# Patient Record
Sex: MALE | Race: BLACK OR AFRICAN AMERICAN | Employment: FULL TIME | URBAN - NONMETROPOLITAN AREA
[De-identification: names, ages, dates, MRNs, and addresses within clinical notes are randomized per-mention and may not be internally consistent; named-entity substitution may affect disease eponyms.]

---

## 2021-12-08 ENCOUNTER — APPOINTMENT (OUTPATIENT)
Dept: GENERAL RADIOLOGY | Age: 51
End: 2021-12-08
Attending: EMERGENCY MEDICINE
Payer: COMMERCIAL

## 2021-12-08 ENCOUNTER — HOSPITAL ENCOUNTER (INPATIENT)
Age: 51
LOS: 19 days | Discharge: HOME | DRG: 853 | End: 2021-12-27
Attending: EMERGENCY MEDICINE | Admitting: INTERNAL MEDICINE
Payer: COMMERCIAL

## 2021-12-08 ENCOUNTER — HOSPITAL ENCOUNTER (EMERGENCY)
Age: 51
Discharge: ANOTHER HOSPITAL | End: 2021-12-08
Attending: EMERGENCY MEDICINE
Payer: COMMERCIAL

## 2021-12-08 ENCOUNTER — APPOINTMENT (OUTPATIENT)
Dept: CT IMAGING | Age: 51
End: 2021-12-08
Attending: EMERGENCY MEDICINE
Payer: COMMERCIAL

## 2021-12-08 VITALS
SYSTOLIC BLOOD PRESSURE: 156 MMHG | HEIGHT: 71 IN | WEIGHT: 162 LBS | TEMPERATURE: 100 F | RESPIRATION RATE: 28 BRPM | HEART RATE: 120 BPM | DIASTOLIC BLOOD PRESSURE: 96 MMHG | OXYGEN SATURATION: 100 % | BODY MASS INDEX: 22.68 KG/M2

## 2021-12-08 DIAGNOSIS — J90 PLEURAL EFFUSION: ICD-10-CM

## 2021-12-08 DIAGNOSIS — J18.9 COMMUNITY ACQUIRED PNEUMONIA OF RIGHT LUNG, UNSPECIFIED PART OF LUNG: Primary | ICD-10-CM

## 2021-12-08 DIAGNOSIS — Z98.890 S/P THORACOTOMY: ICD-10-CM

## 2021-12-08 LAB
ALBUMIN SERPL-MCNC: 1.8 G/DL (ref 3.5–5)
ALBUMIN/GLOB SERPL: 0.3 {RATIO} (ref 1.1–2.2)
ALP SERPL-CCNC: 118 U/L (ref 45–117)
ALT SERPL-CCNC: 56 U/L (ref 12–78)
ANION GAP SERPL CALC-SCNC: 11 MMOL/L (ref 5–15)
ARTERIAL PATENCY WRIST A: ABNORMAL
AST SERPL W P-5'-P-CCNC: 51 U/L (ref 15–37)
BASE EXCESS BLDA CALC-SCNC: 2.7 MMOL/L (ref 0–2)
BASOPHILS # BLD: 0.4 K/UL (ref 0–0.2)
BASOPHILS NFR BLD: 1 % (ref 0–2.5)
BDY SITE: ABNORMAL
BILIRUB SERPL-MCNC: 1 MG/DL (ref 0.2–1)
BUN SERPL-MCNC: 13 MG/DL (ref 6–20)
BUN/CREAT SERPL: 17 (ref 12–20)
CA-I BLD-MCNC: 9 MG/DL (ref 8.5–10.1)
CHLORIDE SERPL-SCNC: 87 MMOL/L (ref 97–108)
CO2 SERPL-SCNC: 28 MMOL/L (ref 21–32)
COHGB MFR BLD: 0.8 % (ref 0–5)
COVID-19 RAPID TEST, COVR: NOT DETECTED
CREAT SERPL-MCNC: 0.78 MG/DL (ref 0.7–1.3)
D DIMER PPP FEU-MCNC: 2.58 MG/L FEU (ref 0.19–0.5)
EOSINOPHIL # BLD: 0 K/UL (ref 0–0.7)
EOSINOPHIL NFR BLD: 0 % (ref 0.9–2.9)
ERYTHROCYTE [DISTWIDTH] IN BLOOD BY AUTOMATED COUNT: 13.5 % (ref 11.5–14.5)
ETHANOL SERPL-MCNC: 28 MG/DL
FIO2 ON VENT: 28 %
GAS FLOW.O2 O2 DELIVERY SYS: 2 L/MIN
GLOBULIN SER CALC-MCNC: 5.6 G/DL (ref 2–4)
GLUCOSE SERPL-MCNC: 138 MG/DL (ref 65–100)
HCO3 BLDA-SCNC: 26 MMOL/L (ref 22–26)
HCT VFR BLD AUTO: 41.1 % (ref 41–53)
HGB BLD OXIMETRY-MCNC: 13.3 G/DL (ref 13.5–17.5)
HGB BLD-MCNC: 13.8 G/DL (ref 13.5–17.5)
INR PPP: 1.2 (ref 0.9–1.1)
LACTATE SERPL-SCNC: 2.2 MMOL/L (ref 0.4–2)
LYMPHOCYTES # BLD: 0.9 K/UL (ref 1–4.8)
LYMPHOCYTES NFR BLD: 3 % (ref 20.5–51.1)
MAGNESIUM SERPL-MCNC: 2.4 MG/DL (ref 1.6–2.4)
MCH RBC QN AUTO: 28.6 PG (ref 31–34)
MCHC RBC AUTO-ENTMCNC: 33.6 G/DL (ref 31–36)
MCV RBC AUTO: 85.1 FL (ref 80–100)
METHGB MFR BLD: 0.3 % (ref 0–5)
MONOCYTES # BLD: 1.5 K/UL (ref 0.2–2.4)
MONOCYTES NFR BLD: 5 % (ref 1.7–9.3)
NEUTS SEG # BLD: 31.4 K/UL (ref 1.8–7.7)
NEUTS SEG NFR BLD: 91 % (ref 42–75)
NRBC # BLD: 0.04 K/UL
NRBC BLD-RTO: 0.1 PER 100 WBC
OXYHGB MFR BLD: 90.7 % (ref 95–100)
PCO2 BLDA: 34 MMHG (ref 35–45)
PH BLDA: 7.5 [PH] (ref 7.35–7.45)
PLATELET # BLD AUTO: 459 K/UL (ref 150–400)
PMV BLD AUTO: 8.8 FL (ref 6.5–11.5)
PO2 BLDA: 60 MMHG (ref 70–100)
POTASSIUM SERPL-SCNC: 3.6 MMOL/L (ref 3.5–5.1)
PROT SERPL-MCNC: 7.4 G/DL (ref 6.4–8.2)
PROTHROMBIN TIME: 12 SEC (ref 9–11.1)
RBC # BLD AUTO: 4.83 M/UL (ref 4.5–5.9)
SAO2% DEVICE SAO2% SENSOR NAME: ABNORMAL
SODIUM SERPL-SCNC: 126 MMOL/L (ref 136–145)
SPECIMEN SITE: ABNORMAL
SPECIMEN SOURCE: NORMAL
WBC # BLD AUTO: 34.4 K/UL (ref 4.4–11.3)

## 2021-12-08 PROCEDURE — 85025 COMPLETE CBC W/AUTO DIFF WBC: CPT

## 2021-12-08 PROCEDURE — 74011000636 HC RX REV CODE- 636: Performed by: EMERGENCY MEDICINE

## 2021-12-08 PROCEDURE — 85610 PROTHROMBIN TIME: CPT

## 2021-12-08 PROCEDURE — 36600 WITHDRAWAL OF ARTERIAL BLOOD: CPT

## 2021-12-08 PROCEDURE — 82803 BLOOD GASES ANY COMBINATION: CPT

## 2021-12-08 PROCEDURE — 99285 EMERGENCY DEPT VISIT HI MDM: CPT

## 2021-12-08 PROCEDURE — 82077 ASSAY SPEC XCP UR&BREATH IA: CPT

## 2021-12-08 PROCEDURE — 87635 SARS-COV-2 COVID-19 AMP PRB: CPT

## 2021-12-08 PROCEDURE — 74011000258 HC RX REV CODE- 258: Performed by: EMERGENCY MEDICINE

## 2021-12-08 PROCEDURE — 71045 X-RAY EXAM CHEST 1 VIEW: CPT

## 2021-12-08 PROCEDURE — 83735 ASSAY OF MAGNESIUM: CPT

## 2021-12-08 PROCEDURE — 74011250636 HC RX REV CODE- 250/636: Performed by: EMERGENCY MEDICINE

## 2021-12-08 PROCEDURE — 65270000029 HC RM PRIVATE

## 2021-12-08 PROCEDURE — 87040 BLOOD CULTURE FOR BACTERIA: CPT

## 2021-12-08 PROCEDURE — 83605 ASSAY OF LACTIC ACID: CPT

## 2021-12-08 PROCEDURE — 99284 EMERGENCY DEPT VISIT MOD MDM: CPT

## 2021-12-08 PROCEDURE — 96374 THER/PROPH/DIAG INJ IV PUSH: CPT

## 2021-12-08 PROCEDURE — 80053 COMPREHEN METABOLIC PANEL: CPT

## 2021-12-08 PROCEDURE — 36415 COLL VENOUS BLD VENIPUNCTURE: CPT

## 2021-12-08 PROCEDURE — 85379 FIBRIN DEGRADATION QUANT: CPT

## 2021-12-08 PROCEDURE — 71275 CT ANGIOGRAPHY CHEST: CPT

## 2021-12-08 RX ORDER — KETOROLAC TROMETHAMINE 30 MG/ML
15 INJECTION, SOLUTION INTRAMUSCULAR; INTRAVENOUS
Status: COMPLETED | OUTPATIENT
Start: 2021-12-08 | End: 2021-12-08

## 2021-12-08 RX ADMIN — KETOROLAC TROMETHAMINE 15 MG: 30 INJECTION, SOLUTION INTRAMUSCULAR; INTRAVENOUS at 22:44

## 2021-12-08 RX ADMIN — CEFTRIAXONE SODIUM 1 G: 1 INJECTION, POWDER, FOR SOLUTION INTRAMUSCULAR; INTRAVENOUS at 21:16

## 2021-12-08 RX ADMIN — SODIUM CHLORIDE 1000 ML: 9 INJECTION, SOLUTION INTRAVENOUS at 18:14

## 2021-12-08 RX ADMIN — IOPAMIDOL 100 ML: 755 INJECTION, SOLUTION INTRAVENOUS at 19:23

## 2021-12-08 NOTE — ED PROVIDER NOTES
EMERGENCY DEPARTMENT HISTORY AND PHYSICAL EXAM      Date: 12/8/2021  Patient Name: Quinton Ramirez      History of Presenting Illness     Chief Complaint   Patient presents with    Shortness of Breath       History Provided By: Patient    HPI: Quinton Ramirez, 46 y.o. male with a past medical history significant asthma presents to the ED with cc of patient sent from PCPs office for low O2 sat of 89% on room air patient himself states that over the last 2 weeks he has been having some shortness of breath right lower back pain stop smoking because when he inhales a smoker it hurts his right lung, patient acknowledges decreased appetite may be some weight loss, is having night sweats but not able to measure his temperature    There are no other complaints, changes, or physical findings at this time. PCP: None        Past History     Past Medical History:  Past Medical History:   Diagnosis Date    Ill-defined condition        Past Surgical History:  No past surgical history on file. Family History:  No family history on file. Social History:  Social History     Tobacco Use    Smoking status: Former Smoker    Smokeless tobacco: Never Used   Substance Use Topics    Alcohol use: Yes    Drug use: Not Currently       Allergies:  No Known Allergies      Review of Systems     Review of Systems   Constitutional: Positive for appetite change and diaphoresis. Negative for chills and fever. HENT: Negative for rhinorrhea and sore throat. Eyes: Negative for pain and visual disturbance. Respiratory: Positive for cough and shortness of breath. Negative for chest tightness. Cardiovascular: Negative for chest pain, palpitations and leg swelling. Gastrointestinal: Negative for abdominal pain and vomiting. Endocrine: Negative for polydipsia and polyuria. Genitourinary: Negative for dysuria and urgency. Musculoskeletal: Negative for back pain and neck pain. Skin: Negative for color change and pallor. Neurological: Negative for weakness and numbness. Psychiatric/Behavioral: Negative. Physical Exam     Physical Exam  Vitals and nursing note reviewed. Constitutional:       General: He is not in acute distress. Appearance: He is well-developed and normal weight. He is not ill-appearing, toxic-appearing or diaphoretic. HENT:      Head: Normocephalic and atraumatic. Mouth/Throat:      Mouth: Mucous membranes are moist.   Eyes:      Extraocular Movements: Extraocular movements intact. Pupils: Pupils are equal, round, and reactive to light. Cardiovascular:      Rate and Rhythm: Regular rhythm. Tachycardia present. Pulses: Normal pulses. Heart sounds: Normal heart sounds. Pulmonary:      Effort: Pulmonary effort is normal. Tachypnea present. No respiratory distress. Breath sounds: Examination of the right-upper field reveals decreased breath sounds. Examination of the right-middle field reveals decreased breath sounds. Examination of the right-lower field reveals decreased breath sounds. Decreased breath sounds present. No wheezing. Chest:      Chest wall: No tenderness or crepitus. Abdominal:      General: Bowel sounds are normal.      Palpations: Abdomen is soft. Musculoskeletal:         General: Normal range of motion. Cervical back: Normal range of motion and neck supple. Right lower leg: No tenderness. No edema. Left lower leg: No tenderness. No edema. Skin:     General: Skin is warm and dry. Capillary Refill: Capillary refill takes less than 2 seconds. Nails: There is clubbing. Neurological:      General: No focal deficit present. Mental Status: He is alert and oriented to person, place, and time. Psychiatric:         Mood and Affect: Mood normal.         Behavior: Behavior normal.         Lab and Diagnostic Study Results     Labs -   No results found for this or any previous visit (from the past 12 hour(s)).     Radiologic Studies -   [unfilled]  CT Results  (Last 48 hours)    None        CXR Results  (Last 48 hours)    None          Medical Decision Making and ED Course   - I am the first and primary provider for this patient AND AM THE PRIMARY PROVIDER OF RECORD. - I reviewed the vital signs, available nursing notes, past medical history, past surgical history, family history and social history. - Initial assessment performed. The patients presenting problems have been discussed, and the staff are in agreement with the care plan formulated and outlined with them. I have encouraged them to ask questions as they arise throughout their visit. Vital Signs-Reviewed the patient's vital signs. Patient Vitals for the past 12 hrs:   Temp Pulse Resp BP SpO2   12/08/21 1715 98.5 °F (36.9 °C) (!) 115 20 (!) 151/100 95 %       Records Reviewed: Nursing Notes    The patient presents with shortness of breath with a differential diagnosis of pneumonia, influenza, COPD    ED Course:       ED Course as of 12/09/21 1955   Wed Dec 08, 2021   1803 WBC(!): 34.4 [SB]   1805 NEUTROPHILS(!): 91 [SB]   1806 Sodium(!): 126 [SB]   1806 Chloride(!): 87 [SB]   1821 Lactic acid(!!): 2.2 [SB]   1822 D-dimer(!): 2.58 [SB]   1949 PO2(!): 60 [SB]   1952 Attempted to call [facility] transfer center, recording stated no one available to take call [SB]   1954 No nurses available to take my call at the transfer center [SB]   2001 Still no nurse available at the transfer center [SB]   2010 Patient accepted by Dr. Vipul Lorenz at [facility] emergency department [SB]      ED Course User Index  [SB] Cynthia Shaver MD         Provider Notes (Medical Decision Making): MDM           Consultations:       Consultations: - NONE        Procedures and Critical Care       Performed by: Connie Escobedo MD  PROCEDURES:  Procedures         TOBACCO COUNSELING: Upon evaluation, pt expressed that they are a current tobacco user.  For approximately 10 minutes, pt has been counseled on the dangers of smoking and was encouraged to quit as soon as possible in order to decrease further risks to their health. Pt has conveyed their understanding of the risks involved should they continue to use tobacco products., ALCOHOL/SUBSTANCE ABUSE COUNSELING: Upon evaluation, pt endorsed recent alcohol/illicit drug use. For approximately 15 minutes, pt has been counseled on the dangers of alcohol and illicit drug use on their health, and they were encouraged to quit as soon as possible in order to decrease further risks to their health. Pt has conveyed their understanding of the risks involved should they continue to use these products. and HYPERTENSION COUNSELING: Education was provided to the patient today regarding their hypertension. Patient is made aware of their elevated blood pressure and is instructed to follow up this week with their Primary Care for a recheck. Patient is counseled regarding consequences of chronic, uncontrolled hypertension including kidney disease, heart disease, stroke or even death. Patient states their understanding and agrees to follow up this week. Additionally, during their visit, I discussed sodium restriction, maintaining ideal body weight and regular exercise program as physiologic means to achieve blood pressure control. The patient will strive towards this. Heladio Jiménez MD        Disposition     Disposition: Condition stable  Transferred to [facility] patient verbally agreed to transfer and understand the risks involved as outlined in the EMTALA form. Remove if not discharged  DISCHARGE PLAN:  1. There are no discharge medications for this patient. 2.   Follow-up Information    None       3. Return to ED if worse   4. There are no discharge medications for this patient. Diagnosis     Clinical Impression: No diagnosis found.     Attestations:    Heladio Jiménez MD    Please note that this dictation was completed with [employer], the computer voice recognition software. Quite often unanticipated grammatical, syntax, homophones, and other interpretive errors are inadvertently transcribed by the computer software. Please disregard these errors. Please excuse any errors that have escaped final proofreading. Thank you.

## 2021-12-08 NOTE — ED TRIAGE NOTES
Pt walked in under his own power--was referred here by PCP--was told hsi oxygen sats were 89 on RA--Pt on arrival 95 % on RA--Pt was also told his xray \"was very cloudy. \"back pain x 2 wks--reports this is a chronic issue.    PMHX asthma

## 2021-12-09 ENCOUNTER — APPOINTMENT (OUTPATIENT)
Dept: INTERVENTIONAL RADIOLOGY/VASCULAR | Age: 51
DRG: 853 | End: 2021-12-09
Attending: INTERNAL MEDICINE
Payer: COMMERCIAL

## 2021-12-09 LAB
ANION GAP SERPL CALC-SCNC: 11 MMOL/L (ref 5–15)
APPEARANCE FLD: ABNORMAL
BUN SERPL-MCNC: 13 MG/DL (ref 6–20)
BUN/CREAT SERPL: 28 (ref 12–20)
CA-I BLD-MCNC: 8.5 MG/DL (ref 8.5–10.1)
CHLORIDE SERPL-SCNC: 98 MMOL/L (ref 97–108)
CO2 SERPL-SCNC: 24 MMOL/L (ref 21–32)
COLOR FLD: YELLOW
CREAT SERPL-MCNC: 0.47 MG/DL (ref 0.7–1.3)
ERYTHROCYTE [DISTWIDTH] IN BLOOD BY AUTOMATED COUNT: 12.4 % (ref 11.5–14.5)
GLUCOSE SERPL-MCNC: 96 MG/DL (ref 65–100)
HCT VFR BLD AUTO: 36 % (ref 36.6–50.3)
HGB BLD-MCNC: 12.8 G/DL (ref 12.1–17)
LACTATE SERPL-SCNC: 1.6 MMOL/L (ref 0.4–2)
LYMPHOCYTES NFR FLD: 32 %
MCH RBC QN AUTO: 28.5 PG (ref 26–34)
MCHC RBC AUTO-ENTMCNC: 35.6 G/DL (ref 30–36.5)
MCV RBC AUTO: 80.2 FL (ref 80–99)
MONOCYTES NFR FLD: 12 %
NEUTROPHILS NFR FLD: 56 %
NRBC # BLD: 0 K/UL (ref 0–0.01)
NRBC BLD-RTO: 0 PER 100 WBC
NUC CELL # FLD: ABNORMAL /CU MM (ref 0–5)
PLATELET # BLD AUTO: 475 K/UL (ref 150–400)
PMV BLD AUTO: 10.8 FL (ref 8.9–12.9)
POTASSIUM SERPL-SCNC: 4 MMOL/L (ref 3.5–5.1)
PROCALCITONIN SERPL-MCNC: 3.09 NG/ML
RBC # BLD AUTO: 4.49 M/UL (ref 4.1–5.7)
RBC # FLD: >100 /CU MM
SODIUM SERPL-SCNC: 133 MMOL/L (ref 136–145)
SPECIMEN SOURCE FLD: ABNORMAL
WBC # BLD AUTO: 32.8 K/UL (ref 4.1–11.1)

## 2021-12-09 PROCEDURE — 82945 GLUCOSE OTHER FLUID: CPT

## 2021-12-09 PROCEDURE — C1729 CATH, DRAINAGE: HCPCS

## 2021-12-09 PROCEDURE — 87070 CULTURE OTHR SPECIMN AEROBIC: CPT

## 2021-12-09 PROCEDURE — 87040 BLOOD CULTURE FOR BACTERIA: CPT

## 2021-12-09 PROCEDURE — 83605 ASSAY OF LACTIC ACID: CPT

## 2021-12-09 PROCEDURE — 84157 ASSAY OF PROTEIN OTHER: CPT

## 2021-12-09 PROCEDURE — 94640 AIRWAY INHALATION TREATMENT: CPT

## 2021-12-09 PROCEDURE — 74011250637 HC RX REV CODE- 250/637: Performed by: INTERNAL MEDICINE

## 2021-12-09 PROCEDURE — 83615 LACTATE (LD) (LDH) ENZYME: CPT

## 2021-12-09 PROCEDURE — 65270000029 HC RM PRIVATE

## 2021-12-09 PROCEDURE — 82042 OTHER SOURCE ALBUMIN QUAN EA: CPT

## 2021-12-09 PROCEDURE — 36415 COLL VENOUS BLD VENIPUNCTURE: CPT

## 2021-12-09 PROCEDURE — 0W9930Z DRAINAGE OF RIGHT PLEURAL CAVITY WITH DRAINAGE DEVICE, PERCUTANEOUS APPROACH: ICD-10-PCS | Performed by: RADIOLOGY

## 2021-12-09 PROCEDURE — 84145 PROCALCITONIN (PCT): CPT

## 2021-12-09 PROCEDURE — 80048 BASIC METABOLIC PNL TOTAL CA: CPT

## 2021-12-09 PROCEDURE — 87205 SMEAR GRAM STAIN: CPT

## 2021-12-09 PROCEDURE — 89050 BODY FLUID CELL COUNT: CPT

## 2021-12-09 PROCEDURE — 87186 SC STD MICRODIL/AGAR DIL: CPT

## 2021-12-09 PROCEDURE — 0W993ZZ DRAINAGE OF RIGHT PLEURAL CAVITY, PERCUTANEOUS APPROACH: ICD-10-PCS | Performed by: RADIOLOGY

## 2021-12-09 PROCEDURE — 74011250636 HC RX REV CODE- 250/636: Performed by: INTERNAL MEDICINE

## 2021-12-09 PROCEDURE — 85027 COMPLETE CBC AUTOMATED: CPT

## 2021-12-09 PROCEDURE — 74011000250 HC RX REV CODE- 250: Performed by: INTERNAL MEDICINE

## 2021-12-09 PROCEDURE — 74011000258 HC RX REV CODE- 258: Performed by: INTERNAL MEDICINE

## 2021-12-09 RX ORDER — DEXTROMETHORPHAN POLISTIREX 30 MG/5ML
30 SUSPENSION ORAL EVERY 12 HOURS
Status: DISCONTINUED | OUTPATIENT
Start: 2021-12-09 | End: 2021-12-13

## 2021-12-09 RX ORDER — POLYETHYLENE GLYCOL 3350 17 G/17G
17 POWDER, FOR SOLUTION ORAL DAILY PRN
Status: DISCONTINUED | OUTPATIENT
Start: 2021-12-09 | End: 2021-12-21

## 2021-12-09 RX ORDER — SODIUM CHLORIDE 0.9 % (FLUSH) 0.9 %
5-40 SYRINGE (ML) INJECTION EVERY 8 HOURS
Status: DISCONTINUED | OUTPATIENT
Start: 2021-12-09 | End: 2021-12-21

## 2021-12-09 RX ORDER — IPRATROPIUM BROMIDE AND ALBUTEROL SULFATE 2.5; .5 MG/3ML; MG/3ML
3 SOLUTION RESPIRATORY (INHALATION)
Status: DISCONTINUED | OUTPATIENT
Start: 2021-12-09 | End: 2021-12-10

## 2021-12-09 RX ORDER — ONDANSETRON 4 MG/1
4 TABLET, ORALLY DISINTEGRATING ORAL
Status: DISCONTINUED | OUTPATIENT
Start: 2021-12-09 | End: 2021-12-27

## 2021-12-09 RX ORDER — BUDESONIDE AND FORMOTEROL FUMARATE DIHYDRATE 160; 4.5 UG/1; UG/1
2 AEROSOL RESPIRATORY (INHALATION)
Status: DISCONTINUED | OUTPATIENT
Start: 2021-12-09 | End: 2021-12-10

## 2021-12-09 RX ORDER — ONDANSETRON 2 MG/ML
4 INJECTION INTRAMUSCULAR; INTRAVENOUS
Status: DISCONTINUED | OUTPATIENT
Start: 2021-12-09 | End: 2021-12-20

## 2021-12-09 RX ORDER — AMLODIPINE BESYLATE 5 MG/1
5 TABLET ORAL DAILY
COMMUNITY
Start: 2021-11-26

## 2021-12-09 RX ORDER — ACETAMINOPHEN 325 MG/1
650 TABLET ORAL
Status: DISCONTINUED | OUTPATIENT
Start: 2021-12-09 | End: 2021-12-21

## 2021-12-09 RX ORDER — AMLODIPINE BESYLATE 5 MG/1
5 TABLET ORAL DAILY
Status: DISCONTINUED | OUTPATIENT
Start: 2021-12-09 | End: 2021-12-27 | Stop reason: HOSPADM

## 2021-12-09 RX ORDER — ASPIRIN 325 MG/1
100 TABLET, FILM COATED ORAL DAILY
Status: DISCONTINUED | OUTPATIENT
Start: 2021-12-09 | End: 2021-12-27

## 2021-12-09 RX ORDER — SODIUM CHLORIDE 0.9 % (FLUSH) 0.9 %
5-40 SYRINGE (ML) INJECTION AS NEEDED
Status: DISCONTINUED | OUTPATIENT
Start: 2021-12-09 | End: 2021-12-21

## 2021-12-09 RX ORDER — ENOXAPARIN SODIUM 100 MG/ML
40 INJECTION SUBCUTANEOUS DAILY
Status: DISCONTINUED | OUTPATIENT
Start: 2021-12-09 | End: 2021-12-20

## 2021-12-09 RX ORDER — OXYCODONE HYDROCHLORIDE 5 MG/1
5 TABLET ORAL
Status: DISCONTINUED | OUTPATIENT
Start: 2021-12-09 | End: 2021-12-16

## 2021-12-09 RX ORDER — SODIUM CHLORIDE 9 MG/ML
100 INJECTION, SOLUTION INTRAVENOUS CONTINUOUS
Status: DISCONTINUED | OUTPATIENT
Start: 2021-12-09 | End: 2021-12-16

## 2021-12-09 RX ORDER — ACETAMINOPHEN 650 MG/1
650 SUPPOSITORY RECTAL
Status: DISCONTINUED | OUTPATIENT
Start: 2021-12-09 | End: 2021-12-20

## 2021-12-09 RX ORDER — SODIUM CHLORIDE 9 MG/ML
10 INJECTION, SOLUTION INTRAVENOUS CONTINUOUS
Status: DISCONTINUED | OUTPATIENT
Start: 2021-12-10 | End: 2021-12-18

## 2021-12-09 RX ADMIN — VANCOMYCIN HYDROCHLORIDE 750 MG: 750 INJECTION, POWDER, LYOPHILIZED, FOR SOLUTION INTRAVENOUS at 11:27

## 2021-12-09 RX ADMIN — SODIUM CHLORIDE 100 ML/HR: 9 INJECTION, SOLUTION INTRAVENOUS at 01:32

## 2021-12-09 RX ADMIN — Medication 10 ML: at 07:18

## 2021-12-09 RX ADMIN — DEXTROMETHORPHAN 30 MG: 30 SUSPENSION, EXTENDED RELEASE ORAL at 21:43

## 2021-12-09 RX ADMIN — DEXTROMETHORPHAN 30 MG: 30 SUSPENSION, EXTENDED RELEASE ORAL at 11:27

## 2021-12-09 RX ADMIN — AMLODIPINE BESYLATE 5 MG: 5 TABLET ORAL at 09:04

## 2021-12-09 RX ADMIN — Medication 10 ML: at 03:14

## 2021-12-09 RX ADMIN — IPRATROPIUM BROMIDE AND ALBUTEROL SULFATE 3 ML: .5; 2.5 SOLUTION RESPIRATORY (INHALATION) at 21:16

## 2021-12-09 RX ADMIN — PIPERACILLIN SODIUM AND TAZOBACTAM SODIUM 3.38 G: 3; .375 INJECTION, POWDER, LYOPHILIZED, FOR SOLUTION INTRAVENOUS at 01:31

## 2021-12-09 RX ADMIN — Medication 10 ML: at 21:44

## 2021-12-09 RX ADMIN — THIAMINE HCL TAB 100 MG 100 MG: 100 TAB at 13:25

## 2021-12-09 RX ADMIN — VANCOMYCIN HYDROCHLORIDE 750 MG: 750 INJECTION, POWDER, LYOPHILIZED, FOR SOLUTION INTRAVENOUS at 03:14

## 2021-12-09 RX ADMIN — IPRATROPIUM BROMIDE AND ALBUTEROL SULFATE 3 ML: .5; 2.5 SOLUTION RESPIRATORY (INHALATION) at 13:00

## 2021-12-09 RX ADMIN — VANCOMYCIN HYDROCHLORIDE 750 MG: 750 INJECTION, POWDER, LYOPHILIZED, FOR SOLUTION INTRAVENOUS at 18:02

## 2021-12-09 RX ADMIN — OXYCODONE 5 MG: 5 TABLET ORAL at 17:07

## 2021-12-09 RX ADMIN — BUDESONIDE AND FORMOTEROL FUMARATE DIHYDRATE 2 PUFF: 160; 4.5 AEROSOL RESPIRATORY (INHALATION) at 21:16

## 2021-12-09 RX ADMIN — PIPERACILLIN SODIUM AND TAZOBACTAM SODIUM 3.38 G: 3; .375 INJECTION, POWDER, LYOPHILIZED, FOR SOLUTION INTRAVENOUS at 09:05

## 2021-12-09 RX ADMIN — ENOXAPARIN SODIUM 40 MG: 100 INJECTION SUBCUTANEOUS at 09:04

## 2021-12-09 RX ADMIN — PIPERACILLIN SODIUM AND TAZOBACTAM SODIUM 3.38 G: 3; .375 INJECTION, POWDER, LYOPHILIZED, FOR SOLUTION INTRAVENOUS at 17:07

## 2021-12-09 RX ADMIN — Medication 10 ML: at 13:25

## 2021-12-09 NOTE — PROGRESS NOTES
Hospitalist Progress Note               Daily Progress Note: 12/9/2021      Subjective: The patient is seen for follow up. He is a 57-year-old male with history of hypertension and asthma who was transferred from [place] [id number] after he was presented there with shortness of breath and productive cough x2 weeks as well as right-sided pleuritic chest pain. Labs showed a white count of 34,000 and mild lactic acidosis. CT of the chest showed a large multiloculated right pleural effusion as well as a left apical nodule and tree-in-bud centrilobular nodules throughout the left lower lobe. Patient was hypoxic and requiring 4 L nasal cannula.   Patient is a chronic smoker    He was started on Zosyn and vancomycin    IR and pulmonology consults were requested    Patient continues on 4 L nasal cannula at this time    Problem List:  Problem List as of 12/9/2021 Never Reviewed          Codes Class Noted - Resolved    Pleural effusion ICD-10-CM: J90  ICD-9-CM: 511.9  12/8/2021 - Present              Medications reviewed  Current Facility-Administered Medications   Medication Dose Route Frequency    0.9% sodium chloride infusion  100 mL/hr IntraVENous CONTINUOUS    amLODIPine (NORVASC) tablet 5 mg  5 mg Oral DAILY    sodium chloride (NS) flush 5-40 mL  5-40 mL IntraVENous Q8H    sodium chloride (NS) flush 5-40 mL  5-40 mL IntraVENous PRN    acetaminophen (TYLENOL) tablet 650 mg  650 mg Oral Q6H PRN    Or    acetaminophen (TYLENOL) suppository 650 mg  650 mg Rectal Q6H PRN    polyethylene glycol (MIRALAX) packet 17 g  17 g Oral DAILY PRN    ondansetron (ZOFRAN ODT) tablet 4 mg  4 mg Oral Q8H PRN    Or    ondansetron (ZOFRAN) injection 4 mg  4 mg IntraVENous Q6H PRN    enoxaparin (LOVENOX) injection 40 mg  40 mg SubCUTAneous DAILY    piperacillin-tazobactam (ZOSYN) 3.375 g in 0.9% sodium chloride (MBP/ADV) 100 mL MBP  3.375 g IntraVENous Q8H    influenza vaccine 2021-22 (6 mos+)(PF) (FLUARIX/FLULAVAL/FLUZONE QUAD) injection 0.5 mL  1 Each IntraMUSCular PRIOR TO DISCHARGE    vancomycin (VANCOCIN) 750 mg in 0.9% sodium chloride 250 mL (VIAL-MATE)  750 mg IntraVENous Q8H    [START ON 12/10/2021] Vancomycin Trough Level 12-10-21 at 1am   Other ONCE    Vancomycin Pharmacy Dosing   Other Rx Dosing/Monitoring       Review of Systems:   A comprehensive review of systems was negative except for that written in the HPI. Objective:   Physical Exam:     Visit Vitals  /78   Pulse (!) 106   Temp 99 °F (37.2 °C)   Resp 18   Ht 5' 11\" (1.803 m)   Wt 73.5 kg (162 lb)   SpO2 99%   BMI 22.59 kg/m²    O2 Flow Rate (L/min): 2 l/min O2 Device: None (Room air)    Temp (24hrs), Av °F (37.2 °C), Min:98.3 °F (36.8 °C), Max:100 °F (37.8 °C)     07 -  1900  In: -   Out: 850 [Urine:850]   No intake/output data recorded. General:   Awake and alert   Lungs:    Decreased breath sounds right lung field   Chest wall:  No tenderness or deformity. Heart:  Regular rate and rhythm, S1, S2 normal, no murmur, click, rub or gallop. Abdomen:   Soft, non-tender. Bowel sounds normal. No masses,  No organomegaly. Extremities: Extremities normal, atraumatic, no cyanosis or edema. Pulses: 2+ and symmetric all extremities. Skin: Skin color, texture, turgor normal. No rashes or lesions   Neurologic: CNII-XII intact.   No gross focal deficits         Data Review:       Recent Days:  Recent Labs     21  0742 21  1734   WBC 32.8* 34.4*   HGB 12.8 13.8   HCT 36.0* 41.1   * 459*     Recent Labs     21  0742 21  1734   * 126*   K 4.0 3.6   CL 98 87*   CO2 24 28   GLU 96 138*   BUN 13 13   CREA 0.47* 0.78   CA 8.5 9.0   MG  --  2.4   ALB  --  1.8*   TBILI  --  1.0   ALT  --  56   INR  --  1.2*     Recent Labs     21  1905   PH 7.50*   PCO2 34*   PO2 60*   HCO3 26   FIO2 28.0       24 Hour Results:  Recent Results (from the past 24 hour(s))   CBC WITH AUTOMATED DIFF    Collection Time: 21 5:34 PM   Result Value Ref Range    WBC 34.4 (H) 4.4 - 11.3 K/uL    RBC 4.83 4.50 - 5.90 M/uL    HGB 13.8 13.5 - 17.5 g/dL    HCT 41.1 41 - 53 %    MCV 85.1 80 - 100 FL    MCH 28.6 (L) 31 - 34 PG    MCHC 33.6 31.0 - 36.0 g/dL    RDW 13.5 11.5 - 14.5 %    PLATELET 132 (H) 411 - 400 K/uL    MPV 8.8 6.5 - 11.5 FL    NRBC 0.1  WBC    ABSOLUTE NRBC 0.04 K/uL    NEUTROPHILS 91 (H) 42 - 75 %    LYMPHOCYTES 3 (L) 20.5 - 51.1 %    MONOCYTES 5 1.7 - 9.3 %    EOSINOPHILS 0 (L) 0.9 - 2.9 %    BASOPHILS 1 0.0 - 2.5 %    ABS. NEUTROPHILS 31.4 (H) 1.8 - 7.7 K/UL    ABS. LYMPHOCYTES 0.9 (L) 1.0 - 4.8 K/UL    ABS. MONOCYTES 1.5 0.2 - 2.4 K/UL    ABS. EOSINOPHILS 0.0 0.0 - 0.7 K/UL    ABS. BASOPHILS 0.4 (H) 0.0 - 0.2 K/UL   PROTHROMBIN TIME + INR    Collection Time: 12/08/21  5:34 PM   Result Value Ref Range    Prothrombin time 12.0 (H) 9.0 - 11.1 sec    INR 1.2 (H) 0.9 - 1.1     D DIMER    Collection Time: 12/08/21  5:34 PM   Result Value Ref Range    D-dimer 2.58 (H) 0.19 - 0.50 mg/L U   METABOLIC PANEL, COMPREHENSIVE    Collection Time: 12/08/21  5:34 PM   Result Value Ref Range    Sodium 126 (L) 136 - 145 mmol/L    Potassium 3.6 3.5 - 5.1 mmol/L    Chloride 87 (L) 97 - 108 mmol/L    CO2 28 21 - 32 mmol/L    Anion gap 11 5 - 15 mmol/L    Glucose 138 (H) 65 - 100 mg/dL    BUN 13 6 - 20 mg/dL    Creatinine 0.78 0.70 - 1.30 mg/dL    BUN/Creatinine ratio 17 12 - 20      GFR est AA >60 >60 ml/min/1.73m2    GFR est non-AA >60 >60 ml/min/1.73m2    Calcium 9.0 8.5 - 10.1 mg/dL    Bilirubin, total 1.0 0.2 - 1.0 mg/dL    AST (SGOT) 51 (H) 15 - 37 U/L    ALT (SGPT) 56 12 - 78 U/L    Alk.  phosphatase 118 (H) 45 - 117 U/L    Protein, total 7.4 6.4 - 8.2 g/dL    Albumin 1.8 (L) 3.5 - 5.0 g/dL    Globulin 5.6 (H) 2.0 - 4.0 g/dL    A-G Ratio 0.3 (L) 1.1 - 2.2     ETHYL ALCOHOL    Collection Time: 12/08/21  5:34 PM   Result Value Ref Range    ALCOHOL(ETHYL),SERUM 28 (H) <10 mg/dL   LACTIC ACID    Collection Time: 12/08/21  5:34 PM Result Value Ref Range    Lactic acid 2.2 (HH) 0.4 - 2.0 mmol/L   MAGNESIUM    Collection Time: 12/08/21  5:34 PM   Result Value Ref Range    Magnesium 2.4 1.6 - 2.4 mg/dL   COVID-19 RAPID TEST    Collection Time: 12/08/21  6:26 PM   Result Value Ref Range    Specimen source Nasopharyngeal      COVID-19 rapid test Not Detected Not Detected     BLOOD GAS, ARTERIAL    Collection Time: 12/08/21  7:05 PM   Result Value Ref Range    pH 7.50 (H) 7.35 - 7.45      PCO2 34 (L) 35 - 45 mmHg    PO2 60 (L) 70 - 100 mmHg    BICARBONATE 26 22 - 26 mmol/L    BASE EXCESS 2.7 (H) 0 - 2 mmol/L    O2 METHOD Nasal Cannula      O2 FLOW RATE 2 L/min    FIO2 28.0 %    Sample source Arterial      SITE Right Radial      ROSANNE'S TEST PASS      Carboxy-Hgb 0.8 0 - 5 %    Methemoglobin 0.3 0 - 5 %    tHb 13.3 (L) 13.5 - 17.5 g/dL    Oxyhemoglobin 90.7 (L) 95 - 100 %   CULTURE, BLOOD, PAIRED    Collection Time: 12/08/21  8:45 PM    Specimen: Blood   Result Value Ref Range    Special Requests: No Special Requests      Culture result: No growth after 9 hours     METABOLIC PANEL, BASIC    Collection Time: 12/09/21  7:42 AM   Result Value Ref Range    Sodium 133 (L) 136 - 145 mmol/L    Potassium 4.0 3.5 - 5.1 mmol/L    Chloride 98 97 - 108 mmol/L    CO2 24 21 - 32 mmol/L    Anion gap 11 5 - 15 mmol/L    Glucose 96 65 - 100 mg/dL    BUN 13 6 - 20 mg/dL    Creatinine 0.47 (L) 0.70 - 1.30 mg/dL    BUN/Creatinine ratio 28 (H) 12 - 20      GFR est AA >60 >60 ml/min/1.73m2    GFR est non-AA >60 >60 ml/min/1.73m2    Calcium 8.5 8.5 - 10.1 mg/dL   CBC W/O DIFF    Collection Time: 12/09/21  7:42 AM   Result Value Ref Range    WBC 32.8 (H) 4.1 - 11.1 K/uL    RBC 4.49 4. 10 - 5.70 M/uL    HGB 12.8 12.1 - 17.0 g/dL    HCT 36.0 (L) 36.6 - 50.3 %    MCV 80.2 80.0 - 99.0 FL    MCH 28.5 26.0 - 34.0 PG    MCHC 35.6 30.0 - 36.5 g/dL    RDW 12.4 11.5 - 14.5 %    PLATELET 523 (H) 337 - 400 K/uL    MPV 10.8 8.9 - 12.9 FL    NRBC 0.0 0.0  WBC    ABSOLUTE NRBC 0. 00 0.00 - 0.01 K/uL   LACTIC ACID    Collection Time: 12/09/21  7:42 AM   Result Value Ref Range    Lactic acid 1.6 0.4 - 2.0 mmol/L       No orders to display        Assessment:  Large right marked multiloculated pleural effusion, suspect parapneumonic      Bilateral community-acquired pneumonia    Sepsis due to above with fever, leukocytosis and tachycardia    Acute respiratory failure with hypoxia    Tobacco abuse      Plan:  Continue vancomycin and Zosyn  Await thoracentesis  Check procalcitonin    Given multiloculated effusion he may end up requiring a VATS      Care Plan discussed with: Patient/Family and Dr. Esther Alcala    Disposition: Continued inpatient care    Total time spent with patient: 30 minutes.     Shubham Beltrán MD

## 2021-12-09 NOTE — H&P
GENERAL GENERIC H&P/CONSULT    Subjective:    61-year-old male with history of hypertension, bronchitis/asthma. Patient is transferred from [facility] after found to have pleural effusion. Patient reports 2 weeks duration of productive cough, shortness of breath, right-sided pleuritic chest pain, generalized weakness, body ache and subjective fever. At the referring ER work-up was pertinent for leukocytosis of 34.4K, mild lactic acidosis and CTA chest showing large multiloculated right pleural effusion. Also large nodule was seen in the left apex. Patient is a chronic smoker. Denies recent travel or sick contact. At arrival to this ER he is on 4 L of oxygen via nasal cannula and saturating above 95%. He looks uncomfortable but not in distress. He has no fever, has mild tachycardia otherwise BP is acceptable. Patient is referred for admission for further evaluation and management. Past Medical History:   Diagnosis Date    Asthma     Ill-defined condition       History reviewed. No pertinent surgical history. Prior to Admission medications    Medication Sig Start Date End Date Taking? Authorizing Provider   amLODIPine (NORVASC) 5 mg tablet Take 5 mg by mouth daily. 11/26/21   Provider, Historical     No Known Allergies   Social History     Tobacco Use    Smoking status: Former Smoker    Smokeless tobacco: Never Used   Substance Use Topics    Alcohol use: Yes     Comment: daily drinker       History reviewed. No pertinent family history. Review of Systems   Constitutional: Negative for appetite change, chills, diaphoresis and fever. HENT: Negative. Eyes: Negative for pain and visual disturbance. Respiratory: Positive for cough and shortness of breath. Cardiovascular: Positive for chest pain. Gastrointestinal: Negative. Endocrine: Negative. Genitourinary: Negative. Musculoskeletal: Negative. Skin: Negative. Neurological: Negative. Psychiatric/Behavioral: Negative. Objective:    No intake/output data recorded. No intake/output data recorded. Patient Vitals for the past 8 hrs:   BP Temp Pulse Resp SpO2 Height Weight   12/09/21 0031 131/83  (!) 107 15 98 %     12/08/21 2312 126/75 99.3 °F (37.4 °C) (!) 109 17 98 %     12/08/21 2221      5' 11\" (1.803 m) 73.5 kg (162 lb)     Physical Exam  Constitutional:       General: He is not in acute distress. Appearance: Normal appearance. HENT:      Head: Normocephalic and atraumatic. Mouth/Throat:      Mouth: Mucous membranes are moist.      Pharynx: Oropharynx is clear. Eyes:      Extraocular Movements: Extraocular movements intact. Conjunctiva/sclera: Conjunctivae normal.      Pupils: Pupils are equal, round, and reactive to light. Cardiovascular:      Rate and Rhythm: Tachycardia present. Pulses: Normal pulses. Heart sounds: Normal heart sounds. No murmur heard. No friction rub. No gallop. Pulmonary:      Effort: Pulmonary effort is normal.      Comments: Decreased air entry on the right hemithorax. No wheezing or crackles  Abdominal:      General: Abdomen is flat. Bowel sounds are normal.      Palpations: Abdomen is soft. Musculoskeletal:         General: Normal range of motion. Cervical back: Normal range of motion and neck supple. Skin:     General: Skin is warm and dry. Neurological:      General: No focal deficit present. Mental Status: He is alert and oriented to person, place, and time. Mental status is at baseline. Cranial Nerves: No cranial nerve deficit. Motor: No weakness. Psychiatric:         Mood and Affect: Mood normal.         Thought Content:  Thought content normal.          Labs:    Recent Results (from the past 24 hour(s))   CBC WITH AUTOMATED DIFF    Collection Time: 12/08/21  5:34 PM   Result Value Ref Range    WBC 34.4 (H) 4.4 - 11.3 K/uL    RBC 4.83 4.50 - 5.90 M/uL    HGB 13.8 13.5 - 17.5 g/dL    HCT 41.1 41 - 53 %    MCV 85.1 80 - 100 FL    MCH 28.6 (L) 31 - 34 PG    MCHC 33.6 31.0 - 36.0 g/dL    RDW 13.5 11.5 - 14.5 %    PLATELET 155 (H) 238 - 400 K/uL    MPV 8.8 6.5 - 11.5 FL    NRBC 0.1  WBC    ABSOLUTE NRBC 0.04 K/uL    NEUTROPHILS 91 (H) 42 - 75 %    LYMPHOCYTES 3 (L) 20.5 - 51.1 %    MONOCYTES 5 1.7 - 9.3 %    EOSINOPHILS 0 (L) 0.9 - 2.9 %    BASOPHILS 1 0.0 - 2.5 %    ABS. NEUTROPHILS 31.4 (H) 1.8 - 7.7 K/UL    ABS. LYMPHOCYTES 0.9 (L) 1.0 - 4.8 K/UL    ABS. MONOCYTES 1.5 0.2 - 2.4 K/UL    ABS. EOSINOPHILS 0.0 0.0 - 0.7 K/UL    ABS. BASOPHILS 0.4 (H) 0.0 - 0.2 K/UL   PROTHROMBIN TIME + INR    Collection Time: 12/08/21  5:34 PM   Result Value Ref Range    Prothrombin time 12.0 (H) 9.0 - 11.1 sec    INR 1.2 (H) 0.9 - 1.1     D DIMER    Collection Time: 12/08/21  5:34 PM   Result Value Ref Range    D-dimer 2.58 (H) 0.19 - 0.50 mg/L U   METABOLIC PANEL, COMPREHENSIVE    Collection Time: 12/08/21  5:34 PM   Result Value Ref Range    Sodium 126 (L) 136 - 145 mmol/L    Potassium 3.6 3.5 - 5.1 mmol/L    Chloride 87 (L) 97 - 108 mmol/L    CO2 28 21 - 32 mmol/L    Anion gap 11 5 - 15 mmol/L    Glucose 138 (H) 65 - 100 mg/dL    BUN 13 6 - 20 mg/dL    Creatinine 0.78 0.70 - 1.30 mg/dL    BUN/Creatinine ratio 17 12 - 20      GFR est AA >60 >60 ml/min/1.73m2    GFR est non-AA >60 >60 ml/min/1.73m2    Calcium 9.0 8.5 - 10.1 mg/dL    Bilirubin, total 1.0 0.2 - 1.0 mg/dL    AST (SGOT) 51 (H) 15 - 37 U/L    ALT (SGPT) 56 12 - 78 U/L    Alk.  phosphatase 118 (H) 45 - 117 U/L    Protein, total 7.4 6.4 - 8.2 g/dL    Albumin 1.8 (L) 3.5 - 5.0 g/dL    Globulin 5.6 (H) 2.0 - 4.0 g/dL    A-G Ratio 0.3 (L) 1.1 - 2.2     ETHYL ALCOHOL    Collection Time: 12/08/21  5:34 PM   Result Value Ref Range    ALCOHOL(ETHYL),SERUM 28 (H) <10 mg/dL   LACTIC ACID    Collection Time: 12/08/21  5:34 PM   Result Value Ref Range    Lactic acid 2.2 (HH) 0.4 - 2.0 mmol/L   MAGNESIUM    Collection Time: 12/08/21  5:34 PM   Result Value Ref Range    Magnesium 2.4 1.6 - 2.4 mg/dL   COVID-19 RAPID TEST    Collection Time: 12/08/21  6:26 PM   Result Value Ref Range    Specimen source Nasopharyngeal      COVID-19 rapid test Not Detected Not Detected     BLOOD GAS, ARTERIAL    Collection Time: 12/08/21  7:05 PM   Result Value Ref Range    pH 7.50 (H) 7.35 - 7.45      PCO2 34 (L) 35 - 45 mmHg    PO2 60 (L) 70 - 100 mmHg    BICARBONATE 26 22 - 26 mmol/L    BASE EXCESS 2.7 (H) 0 - 2 mmol/L    O2 METHOD Nasal Cannula      O2 FLOW RATE 2 L/min    FIO2 28.0 %    Sample source Arterial      SITE Right Radial      ROSANNE'S TEST PASS      Carboxy-Hgb 0.8 0 - 5 %    Methemoglobin 0.3 0 - 5 %    tHb 13.3 (L) 13.5 - 17.5 g/dL    Oxyhemoglobin 90.7 (L) 95 - 100 %       Assessment:  Active Problems:    Pleural effusion (12/8/2021)    Multiloculated right-sided pleural effusion  -Likely infectious in etiology, concerning for empyema  -Leukocytosis and mild lactic acidosis.   No fever, he is hemodynamically stable  -Empiric antibiotic coverage with Zosyn and vancomycin  -Obtain sputum culture  -Urine antigen  -Continue supportive oxygen  -Pulmonary consult  -IR consult for therapeutic and diagnostic thoracentesis    Left apical pulmonary nodule  -Smoker  -Follow-up imaging is warranted    Hypertension  -Amlodipine    DVT prophylaxis: Lovenox    Full code      Signed:  Nicole Franco MD 12/9/2021

## 2021-12-09 NOTE — ED TRIAGE NOTES
Pt is a transfer from [facility] here for PNA/plueral effusion/ pt on 2L NC only c/o at this timie is back pain 8/10

## 2021-12-09 NOTE — ED NOTES
.. TRANSFER - OUT REPORT:    Verbal report given to Jacquelyn KENNEDY [address] 57  being transferred to 56 on 5W for routine progression of care       Report consisted of patients Situation, Background, Assessment and   Recommendations(SBAR). Information from the following report(s) SBAR, ED Summary and MAR was reviewed with the receiving nurse. Lines:       Opportunity for questions and clarification was provided.       Patient transported with:   [employer]

## 2021-12-09 NOTE — ED NOTES
Bedside shift change report given to [address] (oncoming nurse) by James Neil RN (offgoing nurse). Report included the following information SBAR, ED Summary, Recent Results and Med Rec Status.

## 2021-12-09 NOTE — ED PROVIDER NOTES
EMERGENCY DEPARTMENT HISTORY AND PHYSICAL EXAM      Date: 12/8/2021  Patient Name: Sharif Long    History of Presenting Illness     Chief Complaint   Patient presents with    Transfer Of Care       History Provided By: Patient and EMS    HPI: Sharif Long, 46 y.o. male with a past medical history significant asthma presents to the ED with cc of pneumonia with right-sided pleural effusion. Patient transferred from outside facility for admission for the same. Patient was initially hypoxic at 89% on room air upon arrival to the facility, has been 95% or greater on 2 to 3 L nasal cannula. Patient reports right-sided chest wall pain, worse with inspiration. There are no other complaints, changes, or physical findings at this time. PCP: None    Current Facility-Administered Medications on File Prior to Encounter   Medication Dose Route Frequency Provider Last Rate Last Admin    [COMPLETED] sodium chloride 0.9 % bolus infusion 1,000 mL  1,000 mL IntraVENous ONCE Lidia Prieto MD   1,000 mL at 12/08/21 1814    [COMPLETED] iopamidoL (ISOVUE-370) 76 % injection 100 mL  100 mL IntraVENous RAD ONCE Lidia Prieto MD   100 mL at 12/08/21 1923    [COMPLETED] cefTRIAXone (ROCEPHIN) 1 g in 0.9% sodium chloride (MBP/ADV) 50 mL MBP  1 g IntraVENous ONCE Lidia Prieto  mL/hr at 12/08/21 2116 1 g at 12/08/21 2116    [DISCONTINUED] azithromycin (ZITHROMAX) 500 mg in 0.9% sodium chloride 250 mL (VIAL-MATE)  500 mg IntraVENous ONCE Lidia Prieto MD         No current outpatient medications on file prior to encounter. Past History     Past Medical History:  Past Medical History:   Diagnosis Date    Asthma     Ill-defined condition        Past Surgical History:  History reviewed. No pertinent surgical history. Family History:  History reviewed. No pertinent family history.     Social History:  Social History     Tobacco Use    Smoking status: Former Smoker    Smokeless tobacco: Never Used   Substance Use Topics    Alcohol use: Yes     Comment: daily drinker     Drug use: Not Currently       Allergies:  No Known Allergies      Review of Systems   Review of Systems   Constitutional: Negative for chills and fever. HENT: Negative for sinus pressure and sinus pain. Eyes: Negative for photophobia and redness. Respiratory: Positive for shortness of breath and wheezing. Cardiovascular: Positive for right-sided chest pain and negative for palpitations. Gastrointestinal: Negative for abdominal pain and nausea. Genitourinary: Negative for flank pain and hematuria. Musculoskeletal: Negative for arthralgias and gait problem. Skin: Negative for color change and pallor. Neurological: Negative for dizziness and weakness. Review of Systems    Physical Exam   Physical Exam  Constitutional:       General: No acute distress. Appearance: Normal appearance. Not toxic-appearing. HENT:      Head: Normocephalic and atraumatic. Nose: Nose normal.      Mouth/Throat:      Mouth: Mucous membranes are moist.   Eyes:      Extraocular Movements: Extraocular movements intact. Pupils: Pupils are equal, round, and reactive to light. Cardiovascular:      Rate and Rhythm: Normal rate. Pulses: Normal pulses. Pulmonary:      Effort: Mildly tachypneic with pursed lip breathing intermittently. Breath sounds: No stridor. Abdominal:      General: Abdomen is flat. There is no distension. Musculoskeletal:         General: Normal range of motion. Cervical back: Normal range of motion and neck supple. Skin:     General: Skin is warm and dry. Capillary Refill: Capillary refill takes less than 2 seconds. Neurological:      General: No focal deficit present. Mental Status: Aert and oriented to person, place, and time.    Psychiatric:         Mood and Affect: Mood normal.         Behavior: Behavior normal.     Physical Exam    Lab and Diagnostic Study Results     Labs -     Recent Results (from the past 12 hour(s))   CBC WITH AUTOMATED DIFF    Collection Time: 12/08/21  5:34 PM   Result Value Ref Range    WBC 34.4 (H) 4.4 - 11.3 K/uL    RBC 4.83 4.50 - 5.90 M/uL    HGB 13.8 13.5 - 17.5 g/dL    HCT 41.1 41 - 53 %    MCV 85.1 80 - 100 FL    MCH 28.6 (L) 31 - 34 PG    MCHC 33.6 31.0 - 36.0 g/dL    RDW 13.5 11.5 - 14.5 %    PLATELET 024 (H) 274 - 400 K/uL    MPV 8.8 6.5 - 11.5 FL    NRBC 0.1  WBC    ABSOLUTE NRBC 0.04 K/uL    NEUTROPHILS 91 (H) 42 - 75 %    LYMPHOCYTES 3 (L) 20.5 - 51.1 %    MONOCYTES 5 1.7 - 9.3 %    EOSINOPHILS 0 (L) 0.9 - 2.9 %    BASOPHILS 1 0.0 - 2.5 %    ABS. NEUTROPHILS 31.4 (H) 1.8 - 7.7 K/UL    ABS. LYMPHOCYTES 0.9 (L) 1.0 - 4.8 K/UL    ABS. MONOCYTES 1.5 0.2 - 2.4 K/UL    ABS. EOSINOPHILS 0.0 0.0 - 0.7 K/UL    ABS. BASOPHILS 0.4 (H) 0.0 - 0.2 K/UL   PROTHROMBIN TIME + INR    Collection Time: 12/08/21  5:34 PM   Result Value Ref Range    Prothrombin time 12.0 (H) 9.0 - 11.1 sec    INR 1.2 (H) 0.9 - 1.1     D DIMER    Collection Time: 12/08/21  5:34 PM   Result Value Ref Range    D-dimer 2.58 (H) 0.19 - 0.50 mg/L FEU   METABOLIC PANEL, COMPREHENSIVE    Collection Time: 12/08/21  5:34 PM   Result Value Ref Range    Sodium 126 (L) 136 - 145 mmol/L    Potassium 3.6 3.5 - 5.1 mmol/L    Chloride 87 (L) 97 - 108 mmol/L    CO2 28 21 - 32 mmol/L    Anion gap 11 5 - 15 mmol/L    Glucose 138 (H) 65 - 100 mg/dL    BUN 13 6 - 20 mg/dL    Creatinine 0.78 0.70 - 1.30 mg/dL    BUN/Creatinine ratio 17 12 - 20      GFR est AA >60 >60 ml/min/1.73m2    GFR est non-AA >60 >60 ml/min/1.73m2    Calcium 9.0 8.5 - 10.1 mg/dL    Bilirubin, total 1.0 0.2 - 1.0 mg/dL    AST (SGOT) 51 (H) 15 - 37 U/L    ALT (SGPT) 56 12 - 78 U/L    Alk.  phosphatase 118 (H) 45 - 117 U/L    Protein, total 7.4 6.4 - 8.2 g/dL    Albumin 1.8 (L) 3.5 - 5.0 g/dL    Globulin 5.6 (H) 2.0 - 4.0 g/dL    A-G Ratio 0.3 (L) 1.1 - 2.2     ETHYL ALCOHOL    Collection Time: 12/08/21  5:34 PM   Result Value Ref Range    ALCOHOL(ETHYL),SERUM 28 (H) <10 mg/dL   LACTIC ACID    Collection Time: 12/08/21  5:34 PM   Result Value Ref Range    Lactic acid 2.2 (HH) 0.4 - 2.0 mmol/L   MAGNESIUM    Collection Time: 12/08/21  5:34 PM   Result Value Ref Range    Magnesium 2.4 1.6 - 2.4 mg/dL   COVID-19 RAPID TEST    Collection Time: 12/08/21  6:26 PM   Result Value Ref Range    Specimen source Nasopharyngeal      COVID-19 rapid test Not Detected Not Detected     BLOOD GAS, ARTERIAL    Collection Time: 12/08/21  7:05 PM   Result Value Ref Range    pH 7.50 (H) 7.35 - 7.45      PCO2 34 (L) 35 - 45 mmHg    PO2 60 (L) 70 - 100 mmHg    BICARBONATE 26 22 - 26 mmol/L    BASE EXCESS 2.7 (H) 0 - 2 mmol/L    O2 METHOD Nasal Cannula      O2 FLOW RATE 2 L/min    FIO2 28.0 %    Sample source Arterial      SITE Right Radial      ROSANNE'S TEST PASS      Carboxy-Hgb 0.8 0 - 5 %    Methemoglobin 0.3 0 - 5 %    tHb 13.3 (L) 13.5 - 17.5 g/dL    Oxyhemoglobin 90.7 (L) 95 - 100 %       Radiologic Studies -   @lastxrresult@  CT Results  (Last 48 hours)               12/08/21 1922  CTA CHEST W OR W WO CONT Final result    Impression:  Large multiloculated right pleural effusion with subsequent near complete   atelectasis of the right lung. Therapeutic and diagnostic thoracentesis   recommended. Tree-in-bud centrilobular nodules present throughout the left lower lobe with   larger nodular airspace opacity apical posterior segment left upper lobe. Findings consistent with bronchiolitis/bronchopneumonia. The larger airspace   nodule in the apical posterior segment left upper lobe require surveillance. Narrative:  Chest CTA       TECHNIQUE: Multiple continuous axial images were obtained from the thoracic   inlet to the upper abdomen after the uneventful administration of intravenous   contrast. Reformatted images as well as maximum intensity projection images were   obtained in the sagittal and coronal planes. Comment on dose reduction: All CT scans at this facility are performed using   dose reduction optimization technique as appropriate to perform the exam   including the following; automated exposure control, adjustments of the mA   and/or kV according to patient size, or use of iterative reconstructed   technique. Comparison examination: Chest radiograph dated same day        Findings:   LYMPHADENOPATHY: There is no axillary, mediastinal, or hilar lymphadenopathy by   CT size criteria. CARDIOVASCULAR: Heart normal in size. Thoracic aorta normal in caliber. LUNGS AND PLEURA: Large multiloculated right pleural effusion with subsequent   near complete atelectasis of the right lung. Tree-in-bud centrilobular nodules   present throughout the left lower lobe with larger nodular airspace opacity   apical posterior segment left upper lobe. Findings consistent with   bronchiolitis/bronchopneumonia. The larger airspace nodule in the apical   posterior segment left upper lobe require surveillance. INCLUDED ABDOMEN: The visualized upper abdomen images normally. CHEST WALL: Degenerative changes are present throughout the thoracic spine. No   suspicious lytic or blastic lesion is identified. CXR Results  (Last 48 hours)               12/08/21 1756  XR CHEST PORT Final result    Impression:  FINDINGS/IMPRESSION:   Complex pleural parenchymal disease throughout the right hemithorax. Recommend   Chest CT. Narrative:  XR CHEST PORT       Comparison: None                    Medical Decision Making   - I am the first provider for this patient. - I reviewed the vital signs, available nursing notes, past medical history, past surgical history, family history and social history. - Initial assessment performed. The patients presenting problems have been discussed, and they are in agreement with the care plan formulated and outlined with them.   I have encouraged them to ask questions as they arise throughout their visit. Vital Signs-Reviewed the patient's vital signs. No data found. Records Reviewed: Old Medical Records      Disposition   Disposition: Admitted to Floor Medical Floor the case was discussed with the admitting physician         DISCHARGE PLAN:  1. There are no discharge medications for this patient. 2.   Follow-up Information    None       3. Return to ED if worse   4. There are no discharge medications for this patient. Diagnosis     Clinical Impression:   1. Community acquired pneumonia of right lung, unspecified part of lung    2. Pleural effusion        Attestations:    Vargas Proctor MD    Please note that this dictation was completed with [email or web address], the computer voice recognition software. Quite often unanticipated grammatical, syntax, homophones, and other interpretive errors are inadvertently transcribed by the computer software. Please disregard these errors. Please excuse any errors that have escaped final proofreading. Thank you.

## 2021-12-09 NOTE — PROGRESS NOTES
Start   Ordered   12/09/21 0100  IP CONSULT TO PHARMACY - VANCOMYCIN DOSING  ONE TIME     Complete     Comments: Pharmacy will order the necessary lab work, if needed, to complete the dosing and ongoing monitoring of requested drug therapy. Details will be updated within the patients Progress Notes. Ordering Provider: Panfilo Tenorio MD   Authorizing Provider: Panfilo Tenorio MD   Question Answer Comment   Antibiotic Indications Pneumonia (CAP)    CAP duration of therapy 5 days             Vancomycin 750mg IV every eight hours.  Vancomycin  trough level scheduled 12-10-21 at 1am

## 2021-12-09 NOTE — CONSULTS
Interventional Radiology Post Procedure Note    12/9/2021    Indications: Right pleural effusion seen on CT/XR    Procedure(s): US-guided pleural drain placement (8.5F)    Preliminary Findings (full report to follow): Purulent fluid c/w empyema    Fluoro Time: 0 minute(s)    Contrast: None    Specimen: 60 mL purulent fluid sent for lab analysis    Implants: See above    Estimated blood loss: Minimal    Complications: None    Plan: 1. Continue drain flushing at least once a day   2.  Consider drain removal if <20 mL output/24 hours    Follow Up: PRN

## 2021-12-09 NOTE — CONSULTS
Consult  Pulmonary, Critical Care    Name: Roland Brewer MRN: [id number]   : 1970 Hospital: [address] [place] [address]   Date: 2021  Admission date: 2021 Hospital Day: 2       Subjective/Interval History:   Seen on the medical floor awake alert. History is 2 or so weeks of breathing difficulty. Started with severe chest pain pleuritic with chills and sweats he did not take his temperature but felt hot at times. He has had continued worsening presented to the emergency room has a large right pleural effusion. CT scan suggests multiloculated effusion. Leukocytosis up to 34,000 all consistent with empyema. He is a drinker of 1-2 sixpacks a day. Does drink to the point of falling asleep although he states he is never passed out. Hospital Problems  Never Reviewed          Codes Class Noted POA    Pleural effusion ICD-10-CM: J90  ICD-9-CM: 511.9  2021 Unknown              IMPRESSION:   1. Right pleural effusion likely empyema  2. Aspiration pneumonia left side  3. Nodular density left lung probably aspiration pneumonia have to follow to make sure it clears  4. Hyponatremia  5. Alcohol abuse  6. COPD exacerbation  7. Body mass index is 22.59 kg/m². 8.       RECOMMENDATIONS/PLAN:   1. Agree interventional radiology to attempt drainage of the pleural effusion  2. If effusion is severely loculated unable to be drained he may need a VATS procedure  3. We will add nebulized albuterol Atrovent and placed back on his baseline Symbicort  4. Agree with IV Zosyn  5. Watch for DTs  6. We will add thiamine  7. Smoking cessation counseling done  8. [x] High complexity decision making was performed  [x] See my orders for details      Subjective/Initial History:     I was asked by Donna Moon MD to see Roland Brewer  a 46 y.o.    male in consultation for a chief complaint of multiloculated right pleural effusion likely empyema      No Known Allergies     MAR reviewed and pertinent medications noted or modified as needed     Current Facility-Administered Medications   Medication    0.9% sodium chloride infusion    amLODIPine (NORVASC) tablet 5 mg    sodium chloride (NS) flush 5-40 mL    sodium chloride (NS) flush 5-40 mL    acetaminophen (TYLENOL) tablet 650 mg    Or    acetaminophen (TYLENOL) suppository 650 mg    polyethylene glycol (MIRALAX) packet 17 g    ondansetron (ZOFRAN ODT) tablet 4 mg    Or    ondansetron (ZOFRAN) injection 4 mg    enoxaparin (LOVENOX) injection 40 mg    piperacillin-tazobactam (ZOSYN) 3.375 g in 0.9% sodium chloride (MBP/ADV) 100 mL MBP    influenza vaccine 2021-22 (6 mos+)(PF) (FLUARIX/FLULAVAL/FLUZONE QUAD) injection 0.5 mL    vancomycin (VANCOCIN) 750 mg in 0.9% sodium chloride 250 mL (VIAL-MATE)    [START ON 12/10/2021] Vancomycin Trough Level 12-10-21 at 1am    Vancomycin Pharmacy Dosing    dextromethorphan (DELSYM) 30 mg/5 mL syrup 30 mg    albuterol-ipratropium (DUO-NEB) 2.5 MG-0.5 MG/3 ML    budesonide-formoteroL (SYMBICORT) 160-4.5 mcg/actuation HFA inhaler 2 Puff      Patient PCP: None  PMH:  has a past medical history of Asthma and Ill-defined condition. PSH:   has no past surgical history on file. FHX: family history is not on file. SHX:  reports that he has quit smoking. He has never used smokeless tobacco. He reports current alcohol use. He reports previous drug use. To me he admits smoking 1 to 2 packs/day ever since he was 8 or 10. He drinks 1-2 sixpacks a day beer     Systemic review:  General states his weight stable has been having chills and fever for the last 2 weeks or more  Eyes no double vision or momentary blindness  ENT no drainage or facial pain  Musculoskeletal no swollen tender joints  Endocrinologic no polyuria polydipsia  Neurologic no seizures or syncope  Gastrointestinal no nausea vomiting acid indigestion.   Genitourinary no pain or discomfort on urination  Cardiovascular no history of heart disease or ankle edema he has had the pleuritic chest pain no diaphoresis  Respiratory as mentioned cough some yellow sputum fever chills shortness of breath and pleuritic pain on the right    Objective:     Vital Signs: Telemetry:    normal sinus rhythm Intake/Output:   Visit Vitals  BP (!) 148/80 (BP 1 Location: Left upper arm, BP Patient Position: At rest)   Pulse (!) 117   Temp 98.1 °F (36.7 °C)   Resp 20   Ht 5' 11\" (1.803 m)   Wt 73.5 kg (162 lb)   SpO2 100%   BMI 22.59 kg/m²       Temp (24hrs), Av.9 °F (37.2 °C), Min:98.1 °F (36.7 °C), Max:100 °F (37.8 °C)        O2 Device: None (Room air) O2 Flow Rate (L/min): 2 l/min       Wt Readings from Last 4 Encounters:   21 73.5 kg (162 lb)   21 73.5 kg (162 lb)          Intake/Output Summary (Last 24 hours) at 2021 1211  Last data filed at 2021 0714  Gross per 24 hour   Intake    Output 850 ml   Net -850 ml       Last shift:      701 - 1900  In: -   Out: 850 [Urine:850]  Last 3 shifts: No intake/output data recorded. Physical Exam:   General:  male; lying in bed no distress no accessory muscle usage  HEENT: NCAT, poor dentition,  Eyes: anicteric; conjunctiva clear extraocular movements intact  Neck: no nodes, no JVD, no accessory MM use. Chest: no deformity,   Cardiac: Regular rate and rhythm  Lungs: Almost no breath sounds over the entire right chest left side has coarse sounds with expiratory wheezing a few rhonchi in the left base  Abd: Thin soft positive bowel sounds  Ext: no edema; no joint swelling;  No clubbing  : clear urine  Neuro: Awake alert calm speech is clear moves all 4 extremities  Psych- no agitation, oriented to person;   Skin: warm, dry, no cyanosis;   Pulses: Brachial radial pulses intact  Capillary: Normal capillary refill    Labs:    Recent Labs     21  0742 21  1734   WBC 32.8* 34.4*   HGB 12.8 13.8   * 459*   INR  --  1.2*     Recent Labs     21  0770 12/08/21  1734   * 126*   K 4.0 3.6   CL 98 87*   CO2 24 28   GLU 96 138*   BUN 13 13   CREA 0.47* 0.78   CA 8.5 9.0   MG  --  2.4   LAC 1.6 2.2*   ALB  --  1.8*   ALT  --  56     Recent Labs     12/08/21  1905   PH 7.50*   PCO2 34*   PO2 60*   HCO3 26   FIO2 28.0       Lab Results   Component Value Date/Time    Culture result: No growth after 9 hours 12/08/2021 08:45 PM   No results found for: TSH, TSHEXT    Imaging:    CXR Results  (Last 48 hours)               12/08/21 1756  XR CHEST PORT Final result    Impression:  FINDINGS/IMPRESSION:   Complex pleural parenchymal disease throughout the right hemithorax. Recommend   Chest CT. Narrative:  XR CHEST PORT       Comparison: None                Results from Hospital Encounter encounter on 12/08/21    XR CHEST PORT    Narrative  XR CHEST PORT    Comparison: None    Impression  FINDINGS/IMPRESSION:  Complex pleural parenchymal disease throughout the right hemithorax. Recommend  Chest CT. Results from [place] encounter on 12/08/21    CTA CHEST W OR W WO CONT    Narrative  Chest CTA    TECHNIQUE: Multiple continuous axial images were obtained from the thoracic  inlet to the upper abdomen after the uneventful administration of intravenous  contrast. Reformatted images as well as maximum intensity projection images were  obtained in the sagittal and coronal planes. Comment on dose reduction: All CT scans at this facility are performed using  dose reduction optimization technique as appropriate to perform the exam  including the following; automated exposure control, adjustments of the mA  and/or kV according to patient size, or use of iterative reconstructed  technique. Comparison examination: Chest radiograph dated same day    Findings:  LYMPHADENOPATHY: There is no axillary, mediastinal, or hilar lymphadenopathy by  CT size criteria. CARDIOVASCULAR: Heart normal in size. Thoracic aorta normal in caliber.     LUNGS AND PLEURA: Large multiloculated right pleural effusion with subsequent  near complete atelectasis of the right lung. Tree-in-bud centrilobular nodules  present throughout the left lower lobe with larger nodular airspace opacity  apical posterior segment left upper lobe. Findings consistent with  bronchiolitis/bronchopneumonia. The larger airspace nodule in the apical  posterior segment left upper lobe require surveillance. INCLUDED ABDOMEN: The visualized upper abdomen images normally. CHEST WALL: Degenerative changes are present throughout the thoracic spine. No  suspicious lytic or blastic lesion is identified. Impression  Large multiloculated right pleural effusion with subsequent near complete  atelectasis of the right lung. Therapeutic and diagnostic thoracentesis  recommended. Tree-in-bud centrilobular nodules present throughout the left lower lobe with  larger nodular airspace opacity apical posterior segment left upper lobe. Findings consistent with bronchiolitis/bronchopneumonia. The larger airspace  nodule in the apical posterior segment left upper lobe require surveillance. · 12/9 discussion 2-week history of pleuritic pain cough chills sweats fever and a drinker who will drink to the point of falling asleep most likely has aspiration pneumonia with empyema. IR has been consulted for drainage.   He very likely will have multiple loculations preventing drainage and may need VATS    Yudi Foster MD

## 2021-12-09 NOTE — PROGRESS NOTES
Reason for Admission:  Pleural Effusion                     RUR Score:   9%                  Plan for utilizing home health:  Declines        PCP: First and Last name:  Nakul Hoyt MD     Name of Practice:    Are you a current patient: Yes/No:    Approximate date of last visit: 3 Weeks Ago   Can you participate in a virtual visit with your PCP:                     Current Advanced Directive/Advance Care Plan: Full Code      Healthcare Decision Maker:   Click here to complete [id number] including selection of the Healthcare Decision Maker Relationship (ie \"Primary\")           DaughterCarmen, [phone]                  Transition of Care Plan:                    Patient currently lives at home with his girlfriend. There are a few steps to enter the home. Patient has no DME and has never had HH, IRF or SNF services. Patient's family will transport him home at discharge and he can transport himself to follow-up appointments. Patient uses the [facility] in [place], [place]. Current Dispo: Home/self.

## 2021-12-10 LAB
ALBUMIN FLD-MCNC: 0.2 G/DL
ALBUMIN SERPL-MCNC: 1.3 G/DL (ref 3.5–5)
ANION GAP SERPL CALC-SCNC: 8 MMOL/L (ref 5–15)
BASOPHILS # BLD: 0.1 K/UL (ref 0–0.1)
BASOPHILS NFR BLD: 0 % (ref 0–1)
BUN SERPL-MCNC: 13 MG/DL (ref 6–20)
BUN/CREAT SERPL: 22 (ref 12–20)
CA-I BLD-MCNC: 8.6 MG/DL (ref 8.5–10.1)
CHLORIDE SERPL-SCNC: 97 MMOL/L (ref 97–108)
CO2 SERPL-SCNC: 27 MMOL/L (ref 21–32)
CREAT SERPL-MCNC: 0.58 MG/DL (ref 0.7–1.3)
CREAT SERPL-MCNC: 0.59 MG/DL (ref 0.7–1.3)
DIFFERENTIAL METHOD BLD: ABNORMAL
EOSINOPHIL # BLD: 0.1 K/UL (ref 0–0.4)
EOSINOPHIL NFR BLD: 1 % (ref 0–7)
ERYTHROCYTE [DISTWIDTH] IN BLOOD BY AUTOMATED COUNT: 12.5 % (ref 11.5–14.5)
GLUCOSE FLD-MCNC: 4 MG/DL
GLUCOSE SERPL-MCNC: 100 MG/DL (ref 65–100)
HCT VFR BLD AUTO: 36.1 % (ref 36.6–50.3)
HGB BLD-MCNC: 12.6 G/DL (ref 12.1–17)
IMM GRANULOCYTES # BLD AUTO: 0.3 K/UL (ref 0–0.04)
IMM GRANULOCYTES NFR BLD AUTO: 1 % (ref 0–0.5)
LDH FLD L TO P-CCNC: 1630 U/L
LDH FLD L TO P-CCNC: 1639 U/L
LDH SERPL L TO P-CCNC: 210 U/L (ref 85–241)
LYMPHOCYTES # BLD: 1.6 K/UL (ref 0.8–3.5)
LYMPHOCYTES NFR BLD: 7 % (ref 12–49)
MCH RBC QN AUTO: 28.3 PG (ref 26–34)
MCHC RBC AUTO-ENTMCNC: 34.9 G/DL (ref 30–36.5)
MCV RBC AUTO: 81.1 FL (ref 80–99)
MONOCYTES # BLD: 1.6 K/UL (ref 0–1)
MONOCYTES NFR BLD: 7 % (ref 5–13)
NEUTS SEG # BLD: 20.1 K/UL (ref 1.8–8)
NEUTS SEG NFR BLD: 84 % (ref 32–75)
NRBC # BLD: 0 K/UL (ref 0–0.01)
NRBC BLD-RTO: 0 PER 100 WBC
PHOSPHATE SERPL-MCNC: 3.4 MG/DL (ref 2.6–4.7)
PLATELET # BLD AUTO: 454 K/UL (ref 150–400)
PMV BLD AUTO: 10.7 FL (ref 8.9–12.9)
POTASSIUM SERPL-SCNC: 4.5 MMOL/L (ref 3.5–5.1)
PROT FLD-MCNC: 0.8 G/DL
PROT FLD-MCNC: 0.8 G/DL
RBC # BLD AUTO: 4.45 M/UL (ref 4.1–5.7)
SODIUM SERPL-SCNC: 132 MMOL/L (ref 136–145)
SPECIMEN SOURCE FLD: ABNORMAL
SPECIMEN SOURCE FLD: NORMAL
VANCOMYCIN TROUGH SERPL-MCNC: 5.3 UG/ML (ref 5–10)
WBC # BLD AUTO: 23.7 K/UL (ref 4.1–11.1)

## 2021-12-10 PROCEDURE — 74011250636 HC RX REV CODE- 250/636: Performed by: INTERNAL MEDICINE

## 2021-12-10 PROCEDURE — 83615 LACTATE (LD) (LDH) ENZYME: CPT

## 2021-12-10 PROCEDURE — 80069 RENAL FUNCTION PANEL: CPT

## 2021-12-10 PROCEDURE — 85025 COMPLETE CBC W/AUTO DIFF WBC: CPT

## 2021-12-10 PROCEDURE — 74011000250 HC RX REV CODE- 250: Performed by: INTERNAL MEDICINE

## 2021-12-10 PROCEDURE — 94640 AIRWAY INHALATION TREATMENT: CPT

## 2021-12-10 PROCEDURE — 74011250637 HC RX REV CODE- 250/637: Performed by: INTERNAL MEDICINE

## 2021-12-10 PROCEDURE — 99222 1ST HOSP IP/OBS MODERATE 55: CPT | Performed by: THORACIC SURGERY (CARDIOTHORACIC VASCULAR SURGERY)

## 2021-12-10 PROCEDURE — 80202 ASSAY OF VANCOMYCIN: CPT

## 2021-12-10 PROCEDURE — 74011000258 HC RX REV CODE- 258: Performed by: RADIOLOGY

## 2021-12-10 PROCEDURE — 77010033678 HC OXYGEN DAILY

## 2021-12-10 PROCEDURE — 36415 COLL VENOUS BLD VENIPUNCTURE: CPT

## 2021-12-10 PROCEDURE — 94760 N-INVAS EAR/PLS OXIMETRY 1: CPT

## 2021-12-10 PROCEDURE — 74011000258 HC RX REV CODE- 258: Performed by: INTERNAL MEDICINE

## 2021-12-10 PROCEDURE — 65270000029 HC RM PRIVATE

## 2021-12-10 RX ORDER — BUDESONIDE AND FORMOTEROL FUMARATE DIHYDRATE 160; 4.5 UG/1; UG/1
2 AEROSOL RESPIRATORY (INHALATION)
Status: DISCONTINUED | OUTPATIENT
Start: 2021-12-10 | End: 2021-12-17

## 2021-12-10 RX ORDER — ALBUTEROL SULFATE 90 UG/1
2 AEROSOL, METERED RESPIRATORY (INHALATION)
Status: DISCONTINUED | OUTPATIENT
Start: 2021-12-10 | End: 2021-12-11

## 2021-12-10 RX ADMIN — SODIUM CHLORIDE 100 ML/HR: 9 INJECTION, SOLUTION INTRAVENOUS at 00:52

## 2021-12-10 RX ADMIN — PIPERACILLIN SODIUM AND TAZOBACTAM SODIUM 3.38 G: 3; .375 INJECTION, POWDER, LYOPHILIZED, FOR SOLUTION INTRAVENOUS at 18:01

## 2021-12-10 RX ADMIN — VANCOMYCIN HYDROCHLORIDE 1000 MG: 1 INJECTION, POWDER, LYOPHILIZED, FOR SOLUTION INTRAVENOUS at 13:26

## 2021-12-10 RX ADMIN — ALBUTEROL SULFATE 2 PUFF: 108 AEROSOL, METERED RESPIRATORY (INHALATION) at 13:54

## 2021-12-10 RX ADMIN — IPRATROPIUM BROMIDE AND ALBUTEROL SULFATE 3 ML: .5; 2.5 SOLUTION RESPIRATORY (INHALATION) at 08:55

## 2021-12-10 RX ADMIN — Medication 10 ML: at 19:50

## 2021-12-10 RX ADMIN — SODIUM CHLORIDE 10 ML: 900 INJECTION, SOLUTION INTRAVENOUS at 10:30

## 2021-12-10 RX ADMIN — VANCOMYCIN HYDROCHLORIDE 1000 MG: 1 INJECTION, POWDER, LYOPHILIZED, FOR SOLUTION INTRAVENOUS at 06:10

## 2021-12-10 RX ADMIN — DEXTROMETHORPHAN 30 MG: 30 SUSPENSION, EXTENDED RELEASE ORAL at 19:49

## 2021-12-10 RX ADMIN — AMLODIPINE BESYLATE 5 MG: 5 TABLET ORAL at 10:22

## 2021-12-10 RX ADMIN — OXYCODONE 5 MG: 5 TABLET ORAL at 13:31

## 2021-12-10 RX ADMIN — SODIUM CHLORIDE 100 ML/HR: 9 INJECTION, SOLUTION INTRAVENOUS at 19:55

## 2021-12-10 RX ADMIN — OXYCODONE 5 MG: 5 TABLET ORAL at 20:14

## 2021-12-10 RX ADMIN — BUDESONIDE AND FORMOTEROL FUMARATE DIHYDRATE 2 PUFF: 160; 4.5 AEROSOL RESPIRATORY (INHALATION) at 19:15

## 2021-12-10 RX ADMIN — Medication 10 ML: at 06:11

## 2021-12-10 RX ADMIN — THIAMINE HCL TAB 100 MG 100 MG: 100 TAB at 10:23

## 2021-12-10 RX ADMIN — DEXTROMETHORPHAN 30 MG: 30 SUSPENSION, EXTENDED RELEASE ORAL at 10:23

## 2021-12-10 RX ADMIN — PIPERACILLIN SODIUM AND TAZOBACTAM SODIUM 3.38 G: 3; .375 INJECTION, POWDER, LYOPHILIZED, FOR SOLUTION INTRAVENOUS at 03:24

## 2021-12-10 RX ADMIN — ENOXAPARIN SODIUM 40 MG: 100 INJECTION SUBCUTANEOUS at 10:28

## 2021-12-10 RX ADMIN — ALBUTEROL SULFATE 2 PUFF: 108 AEROSOL, METERED RESPIRATORY (INHALATION) at 19:15

## 2021-12-10 RX ADMIN — VANCOMYCIN HYDROCHLORIDE 1000 MG: 1 INJECTION, POWDER, LYOPHILIZED, FOR SOLUTION INTRAVENOUS at 19:49

## 2021-12-10 RX ADMIN — BUDESONIDE AND FORMOTEROL FUMARATE DIHYDRATE 2 PUFF: 160; 4.5 AEROSOL RESPIRATORY (INHALATION) at 08:55

## 2021-12-10 RX ADMIN — PIPERACILLIN SODIUM AND TAZOBACTAM SODIUM 3.38 G: 3; .375 INJECTION, POWDER, LYOPHILIZED, FOR SOLUTION INTRAVENOUS at 10:30

## 2021-12-10 NOTE — PROGRESS NOTES
Hospitalist Progress Note               Daily Progress Note: 12/10/2021      Subjective: The patient is seen for follow up. He is a 49-year-old male with history of hypertension and asthma who was transferred from [facility] after he was presented there with shortness of breath and productive cough x2 weeks as well as right-sided pleuritic chest pain. Labs showed a white count of 34,000 and mild lactic acidosis. CT of the chest showed a large multiloculated right pleural effusion as well as a left apical nodule and tree-in-bud centrilobular nodules throughout the left lower lobe. Patient was hypoxic and requiring 4 L nasal cannula. Patient is a chronic smoker    He was started on Zosyn and vancomycin    -------    Patient underwent thoracentesis yesterday with removal of 60 mL of purulent fluid. Count showed a white count of greater than 47,000 with 56% PMNs. Fluid chemistries and Gram stain are still pending.   Procalcitonin was 3.09    Drainage catheter was left in place and it has put out about 250 mL of purulent fluid    Patient notes some interval improvement in the pleuritic pain on the right    White count down to 23.7 today    Problem List:  Problem List as of 12/10/2021 Never Reviewed          Codes Class Noted - Resolved    Pleural effusion ICD-10-CM: J90  ICD-9-CM: 511.9  12/8/2021 - Present              Medications reviewed  Current Facility-Administered Medications   Medication Dose Route Frequency    vancomycin (VANCOCIN) 1,000 mg in 0.9% sodium chloride 250 mL (VIAL-MATE)  1,000 mg IntraVENous Q8H    [START ON 12/11/2021] Vancomycin Trough Level 12-11-21 at 2 am   Other ONCE    0.9% sodium chloride infusion  100 mL/hr IntraVENous CONTINUOUS    amLODIPine (NORVASC) tablet 5 mg  5 mg Oral DAILY    sodium chloride (NS) flush 5-40 mL  5-40 mL IntraVENous Q8H    sodium chloride (NS) flush 5-40 mL  5-40 mL IntraVENous PRN    acetaminophen (TYLENOL) tablet 650 mg  650 mg Oral Q6H PRN Or    acetaminophen (TYLENOL) suppository 650 mg  650 mg Rectal Q6H PRN    polyethylene glycol (MIRALAX) packet 17 g  17 g Oral DAILY PRN    ondansetron (ZOFRAN ODT) tablet 4 mg  4 mg Oral Q8H PRN    Or    ondansetron (ZOFRAN) injection 4 mg  4 mg IntraVENous Q6H PRN    enoxaparin (LOVENOX) injection 40 mg  40 mg SubCUTAneous DAILY    piperacillin-tazobactam (ZOSYN) 3.375 g in 0.9% sodium chloride (MBP/ADV) 100 mL MBP  3.375 g IntraVENous Q8H    influenza vaccine  (6 mos+)(PF) (FLUARIX/FLULAVAL/FLUZONE QUAD) injection 0.5 mL  1 Each IntraMUSCular PRIOR TO DISCHARGE    Vancomycin Pharmacy Dosing   Other Rx Dosing/Monitoring    dextromethorphan (DELSYM) 30 mg/5 mL syrup 30 mg  30 mg Oral Q12H    albuterol-ipratropium (DUO-NEB) 2.5 MG-0.5 MG/3 ML  3 mL Nebulization Q6HWA RT    budesonide-formoteroL (SYMBICORT) 160-4.5 mcg/actuation HFA inhaler 2 Puff  2 Puff Inhalation BID RT    thiamine mononitrate (B-1) tablet 100 mg  100 mg Oral DAILY    0.9% sodium chloride infusion 10 mL  10 mL Irrigation CONTINUOUS    oxyCODONE IR (ROXICODONE) tablet 5 mg  5 mg Oral Q6H PRN       Review of Systems:   A comprehensive review of systems was negative except for that written in the HPI. Objective:   Physical Exam:     Visit Vitals  /68 (BP 1 Location: Left upper arm, BP Patient Position: At rest;Lying)   Pulse 97   Temp 98.5 °F (36.9 °C)   Resp 18   Ht 5' 11\" (1.803 m)   Wt 73.5 kg (162 lb)   SpO2 91%   BMI 22.59 kg/m²    O2 Flow Rate (L/min): 1 l/min O2 Device: Nasal cannula    Temp (24hrs), Av °F (37.2 °C), Min:98.2 °F (36.8 °C), Max:100.6 °F (38.1 °C)    12/10 0701 - 12/10 1900  In: -   Out: 1600 [Urine:900; Drains:700]    1901 - 12/10 0700  In: -   Out: 5767 [Urine:2100; Drains:1675]    General:   Awake and alert   Lungs:    Decreased breath sounds right lung field   Chest wall:  No tenderness or deformity. Heart:  Regular rate and rhythm, S1, S2 normal, no murmur, click, rub or gallop. Abdomen:   Soft, non-tender. Bowel sounds normal. No masses,  No organomegaly. Extremities: Extremities normal, atraumatic, no cyanosis or edema. Pulses: 2+ and symmetric all extremities. Skin: Skin color, texture, turgor normal. No rashes or lesions   Neurologic: CNII-XII intact.   No gross focal deficits         Data Review:       Recent Days:  Recent Labs     12/10/21  0209 12/09/21  0742 12/08/21  1734   WBC 23.7* 32.8* 34.4*   HGB 12.6 12.8 13.8   HCT 36.1* 36.0* 41.1   * 475* 459*     Recent Labs     12/10/21  0209 12/09/21  0742 12/08/21  1734   NA  --  133* 126*   K  --  4.0 3.6   CL  --  98 87*   CO2  --  24 28   GLU  --  96 138*   BUN  --  13 13   CREA 0.58* 0.47* 0.78   CA  --  8.5 9.0   MG  --   --  2.4   ALB  --   --  1.8*   TBILI  --   --  1.0   ALT  --   --  56   INR  --   --  1.2*     Recent Labs     12/08/21  1905   PH 7.50*   PCO2 34*   PO2 60*   HCO3 26   FIO2 28.0       24 Hour Results:  Recent Results (from the past 24 hour(s))   CELL COUNT, BODY FLUID    Collection Time: 12/09/21 12:00 PM   Result Value Ref Range    BODY FLUID TYPE Pleural fluid      FLUID COLOR Yellow      FLUID APPEARANCE Turbid      FLUID RBC CT. >100 (H) 0 /cu mm    FLUID WBC COUNT 98229 0 - 5 /cu mm    FLD NEUTROPHILS 56 (A) No reference range has been established. %    FLD LYMPHS 32 (A) No reference range has been established. %    FLD MONOCYTES 12 %   PROCALCITONIN    Collection Time: 12/09/21 12:17 PM   Result Value Ref Range    Procalcitonin 3.09 (H) 0 ng/mL   LD    Collection Time: 12/10/21  2:09 AM   Result Value Ref Range     85 - 241 U/L   Julio Cardinal    Collection Time: 12/10/21  2:09 AM   Result Value Ref Range    Vancomycin,trough 5.3 5.0 - 10.0 ug/mL   CREATININE    Collection Time: 12/10/21  2:09 AM   Result Value Ref Range    Creatinine 0.58 (L) 0.70 - 1.30 mg/dL   CBC WITH AUTOMATED DIFF    Collection Time: 12/10/21  2:09 AM   Result Value Ref Range    WBC 23.7 (H) 4.1 - 11.1 K/uL RBC 4.45 4.10 - 5.70 M/uL    HGB 12.6 12.1 - 17.0 g/dL    HCT 36.1 (L) 36.6 - 50.3 %    MCV 81.1 80.0 - 99.0 FL    MCH 28.3 26.0 - 34.0 PG    MCHC 34.9 30.0 - 36.5 g/dL    RDW 12.5 11.5 - 14.5 %    PLATELET 807 (H) 522 - 400 K/uL    MPV 10.7 8.9 - 12.9 FL    NRBC 0.0 0.0  WBC    ABSOLUTE NRBC 0.00 0.00 - 0.01 K/uL    NEUTROPHILS 84 (H) 32 - 75 %    LYMPHOCYTES 7 (L) 12 - 49 %    MONOCYTES 7 5 - 13 %    EOSINOPHILS 1 0 - 7 %    BASOPHILS 0 0 - 1 %    IMMATURE GRANULOCYTES 1 (H) 0 - 0.5 %    ABS. NEUTROPHILS 20.1 (H) 1.8 - 8.0 K/UL    ABS. LYMPHOCYTES 1.6 0.8 - 3.5 K/UL    ABS. MONOCYTES 1.6 (H) 0.0 - 1.0 K/UL    ABS. EOSINOPHILS 0.1 0.0 - 0.4 K/UL    ABS. BASOPHILS 0.1 0.0 - 0.1 K/UL    ABS. IMM. GRANS. 0.3 (H) 0.00 - 0.04 K/UL    DF AUTOMATED         IR THORACENTESIS/INSERT CHEST TUBE   Final Result   Ultrasound of the right upper back was performed with images stored in PACS,   demonstrating heterogeneous collection most consistent with emphysema. Successful US-guided 8.5 percutaneous drainage catheter placement into right   empyema. Plan:   Continue flushing the catheter using 10 ml sterile normal saline and record the   output at least once a day. Consider catheter removal if there is less than 20 mL output over 24 hours. Assessment:  Large right marked multiloculated pleural effusion,  due parapneumonic/empyema    Bilateral community-acquired pneumonia    Sepsis due to above with fever, leukocytosis, elevated procalcitonin and tachycardia    Acute respiratory failure with hypoxia    Tobacco abuse    Alcohol abuse      Plan:  Continue vancomycin and Zosyn    Dr. Aleksey Sanchez already spoke with thoracic surgery who will be out for a week.   Patient will need IV antibiotics until he is able to undergo a VATS    Await remainder of pleural fluid studies including culture      Care Plan discussed with: Patient/Family and Dr. Aleksey Sanchez    Disposition: Continued inpatient care    Total time spent with patient: 30 minutes.     Hudson Oates MD

## 2021-12-10 NOTE — PROGRESS NOTES
Consult  Pulmonary, Critical Care    Name: Sharif Long MRN: [id number]   : 1970 Hospital: [address] [place] [address]   Date: 12/10/2021  Admission date: 2021 Hospital Day: 3       Subjective/Interval History:   Seen on the medical floor awake alert. History is 2 or so weeks of breathing difficulty. Started with severe chest pain pleuritic with chills and sweats he did not take his temperature but felt hot at times. He has had continued worsening presented to the emergency room has a large right pleural effusion. CT scan suggests multiloculated effusion. Leukocytosis up to 34,000 all consistent with empyema. He is a drinker of 1-2 sixpacks a day. Does drink to the point of falling asleep although he states he is never passed out. 12/10 percutaneous drainage catheter placed by IR yesterday with 1600 cc drainage overnight and so far today [address] Problems  Never Reviewed          Codes Class Noted POA    Pleural effusion ICD-10-CM: J90  ICD-9-CM: 511.9  2021 Unknown              IMPRESSION:   1. Right empyema culture pending  2. Aspiration pneumonia left side  3. Nodular density left lung probably aspiration pneumonia have to follow to make sure it clears  4. Hyponatremia  5. Alcohol abuse  6. COPD exacerbation  Body mass index is 22.59 kg/m². 7.       RECOMMENDATIONS/PLAN:   1. Good drainage from right pleural catheter. We will repeat chest x-ray in a.m. 2. No wheezing will switch nebulized bronchodilators to inhaled  3. Continue IV Zosyn  4. Watch for DTs  5. Have added thiamine  6. Smoking cessation counseling done  7. [x] High complexity decision making was performed  [x] See my orders for details      Subjective/Initial History:     I was asked by Ally Velasco MD to see Sharif Long  a 46 y.o.    male in consultation for a chief complaint of multiloculated right pleural effusion likely empyema      No Known Allergies     MAR reviewed and pertinent medications noted or modified as needed     Current Facility-Administered Medications   Medication    vancomycin (VANCOCIN) 1,000 mg in 0.9% sodium chloride 250 mL (VIAL-MATE)    [START ON 12/11/2021] Vancomycin Trough Level 12-11-21 at 2 am    0.9% sodium chloride infusion    amLODIPine (NORVASC) tablet 5 mg    sodium chloride (NS) flush 5-40 mL    sodium chloride (NS) flush 5-40 mL    acetaminophen (TYLENOL) tablet 650 mg    Or    acetaminophen (TYLENOL) suppository 650 mg    polyethylene glycol (MIRALAX) packet 17 g    ondansetron (ZOFRAN ODT) tablet 4 mg    Or    ondansetron (ZOFRAN) injection 4 mg    enoxaparin (LOVENOX) injection 40 mg    piperacillin-tazobactam (ZOSYN) 3.375 g in 0.9% sodium chloride (MBP/ADV) 100 mL MBP    influenza vaccine 2021-22 (6 mos+)(PF) (FLUARIX/FLULAVAL/FLUZONE QUAD) injection 0.5 mL    Vancomycin Pharmacy Dosing    dextromethorphan (DELSYM) 30 mg/5 mL syrup 30 mg    albuterol-ipratropium (DUO-NEB) 2.5 MG-0.5 MG/3 ML    budesonide-formoteroL (SYMBICORT) 160-4.5 mcg/actuation HFA inhaler 2 Puff    thiamine mononitrate (B-1) tablet 100 mg    0.9% sodium chloride infusion 10 mL    oxyCODONE IR (ROXICODONE) tablet 5 mg      Patient PCP: None  PMH:  has a past medical history of Asthma and Ill-defined condition. PSH:   has a past surgical history that includes ir thoracentesis/insert chest tube (12/9/2021). FHX: family history is not on file. SHX:  reports that he has quit smoking. He has never used smokeless tobacco. He reports current alcohol use. He reports previous drug use. To me he admits smoking 1 to 2 packs/day ever since he was 8 or 10.   He drinks 1-2 sixpacks a day beer     Systemic review:  General states his weight stable has been having chills and fever for the last 2 weeks or more  Eyes no double vision or momentary blindness  ENT no drainage or facial pain  Musculoskeletal no swollen tender joints  Endocrinologic no polyuria polydipsia  Neurologic no seizures or syncope  Gastrointestinal no nausea vomiting acid indigestion. Genitourinary no pain or discomfort on urination  Cardiovascular no history of heart disease or ankle edema he has had the pleuritic chest pain no diaphoresis  Respiratory as mentioned cough some yellow sputum fever chills shortness of breath and pleuritic pain on the right    Objective:     Vital Signs: Telemetry:    normal sinus rhythm Intake/Output:   Visit Vitals  /68 (BP 1 Location: Left upper arm, BP Patient Position: At rest;Lying)   Pulse 97   Temp 98.5 °F (36.9 °C)   Resp 18   Ht 5' 11\" (1.803 m)   Wt 73.5 kg (162 lb)   SpO2 91%   BMI 22.59 kg/m²       Temp (24hrs), Av °F (37.2 °C), Min:98.2 °F (36.8 °C), Max:100.6 °F (38.1 °C)        O2 Device: Nasal cannula O2 Flow Rate (L/min): 1 l/min       Wt Readings from Last 4 Encounters:   21 73.5 kg (162 lb)   21 73.5 kg (162 lb)          Intake/Output Summary (Last 24 hours) at 12/10/2021 1259  Last data filed at 12/10/2021 1022  Gross per 24 hour   Intake    Output 4525 ml   Net -4525 ml       Last shift:      12/10 0701 - 12/10 1900  In: -   Out: 1600 [Urine:900; Drains:700]  Last 3 shifts: 1901 - 12/10 0700  In: -   Out: 9788 [Urine:2100; Drains:1675]       Physical Exam:   General:  male; lying in bed no distress no accessory muscle usage  HEENT: NCAT, poor dentition,  Eyes: anicteric; conjunctiva clear extraocular movements intact  Neck: no nodes, no JVD, no accessory MM use. Chest: no deformity,   Cardiac: Regular rate and rhythm  Lungs: Markedly improved breath sounds on the right present anteriorly and upper lung posteriorly still no breath sounds in the right base. Exhalation is coarse on the left side but no definite wheezing  Abd: Thin soft positive bowel sounds  Ext: no edema; no joint swelling;  No clubbing  : clear urine  Neuro: Awake alert calm speech is clear moves all 4 extremities  Psych- no agitation, oriented to person;   Skin: warm, dry, no cyanosis;   Pulses: Brachial radial pulses intact  Capillary: Normal capillary refill    Labs:    Recent Labs     12/10/21  0209 12/09/21  0742 12/08/21  1734   WBC 23.7* 32.8* 34.4*   HGB 12.6 12.8 13.8   * 475* 459*   INR  --   --  1.2*     Recent Labs     12/10/21  0209 12/09/21  0742 12/08/21  1734   * 133* 126*   K 4.5 4.0 3.6   CL 97 98 87*   CO2 27 24 28    96 138*   BUN 13 13 13   CREA 0.59*  0.58* 0.47* 0.78   CA 8.6 8.5 9.0   MG  --   --  2.4   PHOS 3.4  --   --    LAC  --  1.6 2.2*   ALB 1.3*  --  1.8*   ALT  --   --  56     Recent Labs     12/08/21  1905   PH 7.50*   PCO2 34*   PO2 60*   HCO3 26   FIO2 28.0   Pleural fluid WBC count 47,840 with 56% polys 32% lymphs pleural fluid protein 0.8 glucose 4    Lab Results   Component Value Date/Time    Culture result: No growth 2 days 12/08/2021 08:45 PM     Imaging:    CXR Results  (Last 48 hours)               12/08/21 1756  XR CHEST PORT Final result    Impression:  FINDINGS/IMPRESSION:   Complex pleural parenchymal disease throughout the right hemithorax. Recommend   Chest CT. Narrative:  XR CHEST PORT       Comparison: None                Results from Hospital Encounter encounter on 12/08/21    XR CHEST PORT    Narrative  XR CHEST PORT    Comparison: None    Impression  FINDINGS/IMPRESSION:  Complex pleural parenchymal disease throughout the right hemithorax. Recommend  Chest CT. Results from [place] encounter on 12/08/21    CTA CHEST W OR W WO CONT    Narrative  Chest CTA    TECHNIQUE: Multiple continuous axial images were obtained from the thoracic  inlet to the upper abdomen after the uneventful administration of intravenous  contrast. Reformatted images as well as maximum intensity projection images were  obtained in the sagittal and coronal planes.       Comment on dose reduction: All CT scans at this facility are performed using  dose reduction optimization technique as appropriate to perform the exam  including the following; automated exposure control, adjustments of the mA  and/or kV according to patient size, or use of iterative reconstructed  technique. Comparison examination: Chest radiograph dated same day    Findings:  LYMPHADENOPATHY: There is no axillary, mediastinal, or hilar lymphadenopathy by  CT size criteria. CARDIOVASCULAR: Heart normal in size. Thoracic aorta normal in caliber. LUNGS AND PLEURA: Large multiloculated right pleural effusion with subsequent  near complete atelectasis of the right lung. Tree-in-bud centrilobular nodules  present throughout the left lower lobe with larger nodular airspace opacity  apical posterior segment left upper lobe. Findings consistent with  bronchiolitis/bronchopneumonia. The larger airspace nodule in the apical  posterior segment left upper lobe require surveillance. INCLUDED ABDOMEN: The visualized upper abdomen images normally. CHEST WALL: Degenerative changes are present throughout the thoracic spine. No  suspicious lytic or blastic lesion is identified. Impression  Large multiloculated right pleural effusion with subsequent near complete  atelectasis of the right lung. Therapeutic and diagnostic thoracentesis  recommended. Tree-in-bud centrilobular nodules present throughout the left lower lobe with  larger nodular airspace opacity apical posterior segment left upper lobe. Findings consistent with bronchiolitis/bronchopneumonia. The larger airspace  nodule in the apical posterior segment left upper lobe require surveillance. · 12/9 discussion 2-week history of pleuritic pain cough chills sweats fever and a drinker who will drink to the point of falling asleep most likely has aspiration pneumonia with empyema. IR has been consulted for drainage.   He very likely will have multiple loculations preventing drainage and may need VATS    Katie Denson MD

## 2021-12-10 NOTE — CONSULTS
History and Physical    Madonna Cheng is a 46 y.o. male who presents today for pneumonia    HPI I was asked to see him by Dr. Yamilex Nevarez. He was tranfered here from [facility] yesterday with a two week history of cough and right sided chest pain. He comes in now with a parapneumonic fluid collection, a WBC of 34,000. It has alredy been drained with a pig tail catheter by IR and has drained about 2000 of foul smelling pus fluid. I is a chronic smoker and is on a nasal cannula. He reports feeling better at this time with mild shortness of breath. No other complaints. He is [id number] but clearly not septic    Past Medical History:   Diagnosis Date    Asthma     Ill-defined condition        Past Surgical History:   Procedure Laterality Date    IR THORACENTESIS/INSERT CHEST TUBE  12/9/2021       History reviewed. No pertinent family history. Social History     Socioeconomic History    Marital status: UNKNOWN     Spouse name: Not on file    Number of children: Not on file    Years of education: Not on file    Highest education level: Not on file   Occupational History    Not on file   Tobacco Use    Smoking status: Former Smoker    Smokeless tobacco: Never Used   Substance and Sexual Activity    Alcohol use: Yes     Comment: daily drinker     Drug use: Not Currently    Sexual activity: Yes   Other Topics Concern    Not on file   Social History Narrative    Not on file     Social Determinants of Health     Financial Resource Strain:     Difficulty of Paying Living Expenses: Not on file   Food Insecurity:     Worried About Running Out of Food in the Last Year: Not on file    Har[place] of Food in the Last Year: Not on file   Transportation Needs:     Lack of Transportation (Medical): Not on file    Lack of Transportation (Non-Medical):  Not on file   Physical Activity:     Days of Exercise per Week: Not on file    Minutes of Exercise per Session: Not on file   Stress:     Feeling of Stress : Not on file   Social Connections:     Frequency of Communication with Friends and Family: Not on file    Frequency of Social Gatherings with Friends and Family: Not on file    Attends [employer] Services: Not on file    Active Member of [address] [place] [address] [employer] or Organizations: Not on file    Attends Club or Organization Meetings: Not on file    Marital Status: Not on file   Intimate Partner Violence:     Fear of Current or Ex-Partner: Not on file    Emotionally Abused: Not on file    Physically Abused: Not on file    Sexually Abused: Not on file   Housing Stability:     Unable to Pay for Housing in the Last Year: Not on file    Number of [place] in the Last Year: Not on file    Unstable Housing in the Last Year: Not on file       Patient has no known allergies.       Current Facility-Administered Medications   Medication Dose Route Frequency Provider Last Rate Last Admin    vancomycin (VANCOCIN) 1,000 mg in 0.9% sodium chloride 250 mL (VIAL-MATE)  1,000 mg IntraVENous Q8H Toñito Cornejo  mL/hr at 12/10/21 1326 1,000 mg at 12/10/21 1326    [START ON 12/11/2021] Vancomycin Trough Level 12-11-21 at 2 am   Other ONCE Sarah Ontiveros MD        albuterol (PROVENTIL HFA, VENTOLIN HFA, PROAIR HFA) inhaler 2 Puff  2 Puff Inhalation Q6H RT Annie Santiago MD   2 Puff at 12/10/21 1354    budesonide-formoteroL (SYMBICORT) 160-4.5 mcg/actuation HFA inhaler 2 Puff  2 Puff Inhalation BID RT Annie Santiago MD        0.9% sodium chloride infusion  100 mL/hr IntraVENous CONTINUOUS Toñito Cornejo  mL/hr at 12/10/21 0052 100 mL/hr at 12/10/21 0052    amLODIPine (NORVASC) tablet 5 mg  5 mg Oral DAILY Toñito Cornejo MD   5 mg at 12/10/21 1022    sodium chloride (NS) flush 5-40 mL  5-40 mL IntraVENous Q8H Toñito Cornejo MD   10 mL at 12/10/21 0611    sodium chloride (NS) flush 5-40 mL  5-40 mL IntraVENous PRN Panfilo Tenorio MD        acetaminophen (TYLENOL) tablet 650 mg  650 mg Oral Q6H PRN Panfilo Tenorio MD        Or    acetaminophen (TYLENOL) suppository 650 mg  650 mg Rectal Q6H PRN Toñito Cornejo MD        polyethylene glycol (MIRALAX) packet 17 g  17 g Oral DAILY PRN Toñito Cornejo MD        ondansetron (ZOFRAN ODT) tablet 4 mg  4 mg Oral Q8H PRN Toñito Cornejo MD        Or    ondansetron (ZOFRAN) injection 4 mg  4 mg IntraVENous Q6H PRN Toñito Cornejo MD        enoxaparin (LOVENOX) injection 40 mg  40 mg SubCUTAneous DAILY Toñito Cornejo MD   40 mg at 12/10/21 1028    piperacillin-tazobactam (ZOSYN) 3.375 g in 0.9% sodium chloride (MBP/ADV) 100 mL MBP  3.375 g IntraVENous Q8H Toñito Cornejo MD 25 mL/hr at 12/10/21 1030 3.375 g at 12/10/21 1030    influenza vaccine 2021-22 (6 mos+)(PF) (FLUARIX/FLULAVAL/FLUZONE QUAD) injection 0.5 mL  1 Each IntraMUSCular PRIOR TO DISCHARGE Toñito Cornejo MD        Vancomycin Pharmacy Dosing   Other Rx Dosing/Monitoring Yaakov Cornejo MD        dextromethorphan (DELSYM) 30 mg/5 mL syrup 30 mg  30 mg Oral Q12H Laura Valdez MD   30 mg at 12/10/21 1023    thiamine mononitrate (B-1) tablet 100 mg  100 mg Oral DAILY Philip Kinney MD   100 mg at 12/10/21 1023    0.9% sodium chloride infusion 10 mL  10 mL Irrigation CONTINUOUS Oliverio Trotter MD   10 mL at 12/10/21 1030    oxyCODONE IR (ROXICODONE) tablet 5 mg  5 mg Oral Q6H PRN Laura Valdez MD   5 mg at 12/10/21 1331       Review of Systems   Constitutional: Positive for fever. Negative for chills, diaphoresis, malaise/fatigue and weight loss. No decreased activity   HENT: Negative for congestion, ear pain, hearing loss, nosebleeds and sore throat. Eyes: Negative for blurred vision, double vision and discharge. No visual disturbances  No icterus   Respiratory: Positive for sputum production and shortness of breath. Negative for cough, hemoptysis, wheezing and stridor.          No cyanosis  No sleep apnea   Cardiovascular: Positive for chest pain. Negative for palpitations, orthopnea, claudication, leg swelling and PND. No bradycardia  No tachycardia  No syncope   Gastrointestinal: Negative for abdominal pain, blood in stool, constipation, diarrhea, heartburn, melena, nausea and vomiting. Genitourinary: Negative for dysuria, flank pain, frequency, hematuria and urgency. No lesions  No uretheral discharge   Musculoskeletal: Negative for back pain, falls, joint pain, myalgias and neck pain. No decreased range of motion   Skin: Negative for itching and rash. No abrasions,   No skin breakdown,   No burns  No dry skin  No petechia,   No skin lesions  No hypertrophic scar,   No keloid scar   Neurological: Negative for dizziness, tingling, tremors, speech change, focal weakness, seizures, loss of consciousness, weakness and headaches. Endo/Heme/Allergies: Negative for polydipsia. Does not bruise/bleed easily. No swollen lymph glands  No excessive thirst,  No heat intolerance  No excessive hunger  Not immuno compromised  No recurrent fevers  No recurrentt infections,    Psychiatric/Behavioral: Negative for depression, hallucinations and suicidal ideas. No suicidal  No Shantel        Patient Active Problem List   Diagnosis Code    Pleural effusion J90       /64 (BP Patient Position: At rest)   Pulse (!) 104   Temp 99.4 °F (37.4 °C)   Resp 16   Ht 5' 11\" (1.803 m)   Wt 162 lb (73.5 kg)   SpO2 100%   BMI 22.59 kg/m²     Physical Exam  Vitals and nursing note reviewed. Constitutional:       Appearance: Normal appearance. He is well-developed. Comments: Ambulating nromally,   HENT:      Mouth/Throat:      Lips: No lesions. Dentition: Normal dentition. No dental caries. Tongue: No lesions. Tongue does not deviate from midline. Palate: No mass and lesions. Pharynx: No posterior oropharyngeal erythema.    Eyes:      General: Lids are normal.      Conjunctiva/sclera: Conjunctivae normal.      Right eye: Right conjunctiva is not injected. Left eye: Left conjunctiva is not injected. Pupils: Pupils are equal, round, and reactive to light. Comments: No Xanthelasma  No Ptosis  Sclera Non icteric   Neck:      Thyroid: No thyroid mass or thyromegaly. Vascular: No carotid bruit, hepatojugular reflux or JVD. Cardiovascular:      Rate and Rhythm: Normal rate and regular rhythm. Chest Wall: PMI is not displaced. Pulses: Normal pulses. Carotid pulses are 2+ on the right side and 2+ on the left side. Radial pulses are 2+ on the right side and 2+ on the left side. Femoral pulses are 2+ on the right side and 2+ on the left side. Popliteal pulses are 2+ on the right side and 2+ on the left side. Dorsalis pedis pulses are 2+ on the right side and 2+ on the left side. Posterior tibial pulses are 2+ on the right side and 2+ on the left side. Heart sounds: Normal heart sounds. No murmur heard. No friction rub. No gallop. Pulmonary:      Effort: Pulmonary effort is normal. No tachypnea, bradypnea, accessory muscle usage, respiratory distress or retractions. Breath sounds: Normal breath sounds. No stridor. Comments: Decreased breath sounds on the right   Chest:   Breasts:      Right: No supraclavicular adenopathy. Left: No supraclavicular adenopathy. Abdominal:      General: Bowel sounds are normal. There is no distension. Palpations: Abdomen is soft. There is no hepatomegaly, splenomegaly or mass. Tenderness: There is no abdominal tenderness. Comments: Liver Non Tender, No hepatomegaly  Spleen non tender, no splenomegaly   Musculoskeletal:         General: Normal range of motion. Comments: Digits and Nails No cyanosis   Lymphadenopathy:      Cervical: No cervical adenopathy.       Upper Body:      Right upper body: No supraclavicular adenopathy. Left upper body: No supraclavicular adenopathy. Skin:     Coloration: Skin is not jaundiced. Findings: No abrasion, bruising, ecchymosis, erythema, lesion or petechiae. Nails: There is no clubbing. Neurological:      General: No focal deficit present. Mental Status: He is alert and oriented to person, place, and time. Cranial Nerves: Cranial nerves are intact. Sensory: Sensation is intact. Motor: Motor function is intact. No weakness. Coordination: Coordination is intact. Gait: Gait is intact. Gait normal.      Comments: Normal Stance   Psychiatric:         Attention and Perception: Attention normal.         Mood and Affect: Mood and affect normal.         Speech: Speech normal.         Behavior: Behavior normal.         Problem List Items Addressed This Visit        Respiratory    Pleural effusion      Other Visit Diagnoses     Community acquired pneumonia of right lung, unspecified part of lung    -  Primary          Assessment and Plan:   Pneumonia with infected parapneumonic effusion. Which is draining well. The plan would be, drainage as has been done, antibiotics and then adjust according to what comes out. There is a pleural peal and he will require a thoracotomy and decortication for empyema (not a thoroscopic procedure. I would normally recommend 7 days of antibioticsfrom today and then operation. As as long as he is improving and decreasing WBC etc much less change for significant septic episodes in ICU after decortication and if he was continuing to improve and get better day but WBC still [id number] by falling I might wait up to 10 days. So I would say that he is not ready for operation and I would not operate on him until Friday Dec 17,  HOWEVER  I am out next week and unfortunately there is always someone that needs an operation!, but I can;t do him and then not be here for his post operative care.      I will see him this Weekend and Monday, but then I am out until Dec 21. So the options for the medical service are to transfer him to another hospital to have surgery done the end of next week or keep him in the hospital over next weekend and I will operate on him on Tuesday. Which medically is just fine, other than he sits here an extra 4 days. I will see him the next three days and then follow his course in the computer. I have discussed with Dr. Aiden Cr.                   Venus Pierce MD

## 2021-12-10 NOTE — PROGRESS NOTES
Problem: Falls - Risk of  Goal: *Absence of Falls  Description: Document Clydene Bone Fall Risk and appropriate interventions in the flowsheet. Outcome: Progressing Towards Goal  Note: Fall Risk Interventions:            Medication Interventions: Bed/chair exit alarm, Patient to call before getting OOB, Teach patient to arise slowly              Continue to monitor output from accordian drain and keep pt comfort.

## 2021-12-10 NOTE — PROGRESS NOTES
CM reviewed chart. Patient pending thoracentesis and possible VATS procedure. Patient will need at least 48 more hours before discharge. Current Dispo: Home/self. CM will continue to follow for possible needs at discharge.

## 2021-12-10 NOTE — PROGRESS NOTES
Vancomycin trough results = 5.3; Will increase Vancomycin to 1000mg IV every 8 hours.  Next vancomycin trough scheduled for 2 am on 12-11-21

## 2021-12-11 ENCOUNTER — APPOINTMENT (OUTPATIENT)
Dept: GENERAL RADIOLOGY | Age: 51
DRG: 853 | End: 2021-12-11
Attending: INTERNAL MEDICINE
Payer: COMMERCIAL

## 2021-12-11 LAB
ALBUMIN SERPL-MCNC: 1.2 G/DL (ref 3.5–5)
ANION GAP SERPL CALC-SCNC: 8 MMOL/L (ref 5–15)
BASOPHILS # BLD: 0.1 K/UL (ref 0–0.1)
BASOPHILS NFR BLD: 0 % (ref 0–1)
BUN SERPL-MCNC: 10 MG/DL (ref 6–20)
BUN/CREAT SERPL: 23 (ref 12–20)
CA-I BLD-MCNC: 8.1 MG/DL (ref 8.5–10.1)
CHLORIDE SERPL-SCNC: 96 MMOL/L (ref 97–108)
CO2 SERPL-SCNC: 27 MMOL/L (ref 21–32)
CREAT SERPL-MCNC: 0.44 MG/DL (ref 0.7–1.3)
DIFFERENTIAL METHOD BLD: ABNORMAL
EOSINOPHIL # BLD: 0.1 K/UL (ref 0–0.4)
EOSINOPHIL NFR BLD: 1 % (ref 0–7)
ERYTHROCYTE [DISTWIDTH] IN BLOOD BY AUTOMATED COUNT: 12.7 % (ref 11.5–14.5)
GLUCOSE SERPL-MCNC: 98 MG/DL (ref 65–100)
HCT VFR BLD AUTO: 34.3 % (ref 36.6–50.3)
HGB BLD-MCNC: 11.9 G/DL (ref 12.1–17)
IMM GRANULOCYTES # BLD AUTO: 0.4 K/UL (ref 0–0.04)
IMM GRANULOCYTES NFR BLD AUTO: 2 % (ref 0–0.5)
LYMPHOCYTES # BLD: 1.5 K/UL (ref 0.8–3.5)
LYMPHOCYTES NFR BLD: 8 % (ref 12–49)
MCH RBC QN AUTO: 28.1 PG (ref 26–34)
MCHC RBC AUTO-ENTMCNC: 34.7 G/DL (ref 30–36.5)
MCV RBC AUTO: 81.1 FL (ref 80–99)
MONOCYTES # BLD: 1.5 K/UL (ref 0–1)
MONOCYTES NFR BLD: 8 % (ref 5–13)
NEUTS SEG # BLD: 16.7 K/UL (ref 1.8–8)
NEUTS SEG NFR BLD: 81 % (ref 32–75)
NRBC # BLD: 0 K/UL (ref 0–0.01)
NRBC BLD-RTO: 0 PER 100 WBC
PHOSPHATE SERPL-MCNC: 3.4 MG/DL (ref 2.6–4.7)
PLATELET # BLD AUTO: 436 K/UL (ref 150–400)
PMV BLD AUTO: 10.9 FL (ref 8.9–12.9)
POTASSIUM SERPL-SCNC: 3.6 MMOL/L (ref 3.5–5.1)
PROCALCITONIN SERPL-MCNC: 1.48 NG/ML
RBC # BLD AUTO: 4.23 M/UL (ref 4.1–5.7)
SODIUM SERPL-SCNC: 131 MMOL/L (ref 136–145)
VANCOMYCIN TROUGH SERPL-MCNC: 12.4 UG/ML (ref 5–10)
WBC # BLD AUTO: 20.3 K/UL (ref 4.1–11.1)

## 2021-12-11 PROCEDURE — 74011250636 HC RX REV CODE- 250/636: Performed by: INTERNAL MEDICINE

## 2021-12-11 PROCEDURE — 36415 COLL VENOUS BLD VENIPUNCTURE: CPT

## 2021-12-11 PROCEDURE — 65270000029 HC RM PRIVATE

## 2021-12-11 PROCEDURE — 94760 N-INVAS EAR/PLS OXIMETRY 1: CPT

## 2021-12-11 PROCEDURE — 74011000258 HC RX REV CODE- 258: Performed by: INTERNAL MEDICINE

## 2021-12-11 PROCEDURE — 77010033678 HC OXYGEN DAILY

## 2021-12-11 PROCEDURE — 94640 AIRWAY INHALATION TREATMENT: CPT

## 2021-12-11 PROCEDURE — 74011250637 HC RX REV CODE- 250/637: Performed by: INTERNAL MEDICINE

## 2021-12-11 PROCEDURE — 80069 RENAL FUNCTION PANEL: CPT

## 2021-12-11 PROCEDURE — 71045 X-RAY EXAM CHEST 1 VIEW: CPT

## 2021-12-11 PROCEDURE — 80202 ASSAY OF VANCOMYCIN: CPT

## 2021-12-11 PROCEDURE — 85025 COMPLETE CBC W/AUTO DIFF WBC: CPT

## 2021-12-11 PROCEDURE — 84145 PROCALCITONIN (PCT): CPT

## 2021-12-11 PROCEDURE — 99231 SBSQ HOSP IP/OBS SF/LOW 25: CPT | Performed by: THORACIC SURGERY (CARDIOTHORACIC VASCULAR SURGERY)

## 2021-12-11 RX ORDER — ALBUTEROL SULFATE 90 UG/1
2 AEROSOL, METERED RESPIRATORY (INHALATION)
Status: DISCONTINUED | OUTPATIENT
Start: 2021-12-11 | End: 2021-12-17

## 2021-12-11 RX ADMIN — AMLODIPINE BESYLATE 5 MG: 5 TABLET ORAL at 08:41

## 2021-12-11 RX ADMIN — ENOXAPARIN SODIUM 40 MG: 100 INJECTION SUBCUTANEOUS at 08:40

## 2021-12-11 RX ADMIN — Medication 10 ML: at 15:28

## 2021-12-11 RX ADMIN — DEXTROMETHORPHAN 30 MG: 30 SUSPENSION, EXTENDED RELEASE ORAL at 20:56

## 2021-12-11 RX ADMIN — ALBUTEROL SULFATE 2 PUFF: 108 INHALANT RESPIRATORY (INHALATION) at 20:18

## 2021-12-11 RX ADMIN — ALBUTEROL SULFATE 2 PUFF: 108 INHALANT RESPIRATORY (INHALATION) at 14:14

## 2021-12-11 RX ADMIN — SODIUM CHLORIDE 100 ML/HR: 9 INJECTION, SOLUTION INTRAVENOUS at 11:29

## 2021-12-11 RX ADMIN — OXYCODONE 5 MG: 5 TABLET ORAL at 20:55

## 2021-12-11 RX ADMIN — Medication 10 ML: at 05:16

## 2021-12-11 RX ADMIN — BUDESONIDE AND FORMOTEROL FUMARATE DIHYDRATE 2 PUFF: 160; 4.5 AEROSOL RESPIRATORY (INHALATION) at 20:18

## 2021-12-11 RX ADMIN — THIAMINE HCL TAB 100 MG 100 MG: 100 TAB at 08:40

## 2021-12-11 RX ADMIN — OXYCODONE 5 MG: 5 TABLET ORAL at 08:40

## 2021-12-11 RX ADMIN — PIPERACILLIN SODIUM AND TAZOBACTAM SODIUM 3.38 G: 3; .375 INJECTION, POWDER, LYOPHILIZED, FOR SOLUTION INTRAVENOUS at 11:26

## 2021-12-11 RX ADMIN — BUDESONIDE AND FORMOTEROL FUMARATE DIHYDRATE 2 PUFF: 160; 4.5 AEROSOL RESPIRATORY (INHALATION) at 08:53

## 2021-12-11 RX ADMIN — DEXTROMETHORPHAN 30 MG: 30 SUSPENSION, EXTENDED RELEASE ORAL at 08:40

## 2021-12-11 RX ADMIN — PIPERACILLIN SODIUM AND TAZOBACTAM SODIUM 3.38 G: 3; .375 INJECTION, POWDER, LYOPHILIZED, FOR SOLUTION INTRAVENOUS at 18:39

## 2021-12-11 RX ADMIN — OXYCODONE 5 MG: 5 TABLET ORAL at 02:20

## 2021-12-11 RX ADMIN — ALBUTEROL SULFATE 2 PUFF: 108 INHALANT RESPIRATORY (INHALATION) at 08:53

## 2021-12-11 RX ADMIN — PIPERACILLIN SODIUM AND TAZOBACTAM SODIUM 3.38 G: 3; .375 INJECTION, POWDER, LYOPHILIZED, FOR SOLUTION INTRAVENOUS at 02:54

## 2021-12-11 RX ADMIN — Medication 10 ML: at 21:00

## 2021-12-11 NOTE — PROGRESS NOTES
Hospitalist Progress Note               Daily Progress Note: 12/11/2021      Subjective: The patient is seen for follow up. He is a 78-year-old male with history of hypertension and asthma who was transferred from [facility] after he was presented there with shortness of breath and productive cough x2 weeks as well as right-sided pleuritic chest pain. Labs showed a white count of 34,000 and mild lactic acidosis. CT of the chest showed a large multiloculated right pleural effusion as well as a left apical nodule and tree-in-bud centrilobular nodules throughout the left lower lobe. Patient was hypoxic and requiring 4 L nasal cannula. Patient is a chronic smoker    He was started on Zosyn and vancomycin    -------  Today, patient seen at bedside, resting in mild distress due to discomfort. Patient has good oral intake, he states he is doing much better other than the discomfort from catheter in place. He still has in right lower lobe area with coughing, but states it is significantly improved from the prior severe episode. He denies nausea, vomiting, chest pain, shortness of breath, or headache. Right lower lobe crackles heart on exam. Drainage catheter was left in place and it has put out about 2,000 of foul smelling purulent fluid. ABG 3 days ago showed respiratory alkalosis with pH 7.50 and PCO2 34. WBC continues to decrease at 20,300 today. Albumin markedly decreased at 1.2. Procalcitonin improving at 1.48 today. Blood culture no growth for 3 and 1 day. 3+ gram variable rods, 2+ WBC, >100 RBC, heavy probably microaerophilic streptococcus in drained pleural fluid analysis. Repeat CXR showed right-sided hydropneumothorax, 1.1 cm apical pleural separation.     Problem List:  Problem List as of 12/11/2021 Never Reviewed          Codes Class Noted - Resolved    Pleural effusion ICD-10-CM: J90  ICD-9-CM: 511.9  12/8/2021 - Present              Medications reviewed  Current Facility-Administered Medications Medication Dose Route Frequency    albuterol (PROVENTIL HFA, VENTOLIN HFA, PROAIR HFA) inhaler 2 Puff  2 Puff Inhalation Q6HWA RT    vancomycin (VANCOCIN) 1,000 mg in 0.9% sodium chloride 250 mL (VIAL-MATE)  1,000 mg IntraVENous Q8H    budesonide-formoteroL (SYMBICORT) 160-4.5 mcg/actuation HFA inhaler 2 Puff  2 Puff Inhalation BID RT    0.9% sodium chloride infusion  100 mL/hr IntraVENous CONTINUOUS    amLODIPine (NORVASC) tablet 5 mg  5 mg Oral DAILY    sodium chloride (NS) flush 5-40 mL  5-40 mL IntraVENous Q8H    sodium chloride (NS) flush 5-40 mL  5-40 mL IntraVENous PRN    acetaminophen (TYLENOL) tablet 650 mg  650 mg Oral Q6H PRN    Or    acetaminophen (TYLENOL) suppository 650 mg  650 mg Rectal Q6H PRN    polyethylene glycol (MIRALAX) packet 17 g  17 g Oral DAILY PRN    ondansetron (ZOFRAN ODT) tablet 4 mg  4 mg Oral Q8H PRN    Or    ondansetron (ZOFRAN) injection 4 mg  4 mg IntraVENous Q6H PRN    enoxaparin (LOVENOX) injection 40 mg  40 mg SubCUTAneous DAILY    piperacillin-tazobactam (ZOSYN) 3.375 g in 0.9% sodium chloride (MBP/ADV) 100 mL MBP  3.375 g IntraVENous Q8H    influenza vaccine 2021-22 (6 mos+)(PF) (FLUARIX/FLULAVAL/FLUZONE QUAD) injection 0.5 mL  1 Each IntraMUSCular PRIOR TO DISCHARGE    Vancomycin Pharmacy Dosing   Other Rx Dosing/Monitoring    dextromethorphan (DELSYM) 30 mg/5 mL syrup 30 mg  30 mg Oral Q12H    thiamine mononitrate (B-1) tablet 100 mg  100 mg Oral DAILY    0.9% sodium chloride infusion 10 mL  10 mL Irrigation CONTINUOUS    oxyCODONE IR (ROXICODONE) tablet 5 mg  5 mg Oral Q6H PRN       Review of Systems:   A comprehensive review of systems was negative except for that written in the HPI.     Objective:   Physical Exam:     Visit Vitals  /64 (BP 1 Location: Right upper arm, BP Patient Position: At rest)   Pulse 90   Temp 98.6 °F (37 °C)   Resp 16   Ht 5' 11\" (1.803 m)   Wt 162 lb (73.5 kg)   SpO2 95%   BMI 22.59 kg/m²    O2 Flow Rate (L/min): 1 l/min O2 Device: Nasal cannula    Temp (24hrs), Av.1 °F (37.3 °C), Min:98.6 °F (37 °C), Max:99.4 °F (37.4 °C)    701 - 1900  In: -   Out: 50 [Drains:50]   1901 - 700  In: 4811.7 [P.O.:400; I.V.:4401.7]  Out: 9366 [Urine:3000; Drains:920]    General:   Awake and alert   Lungs:    Decreased breath sounds right lung field   Chest wall:  No tenderness or deformity. Heart:  Regular rate and rhythm, S1, S2 normal, no murmur, click, rub or gallop. Abdomen:   Soft, non-tender. Bowel sounds normal. No masses,  No organomegaly. Extremities: Extremities normal, atraumatic, no cyanosis or edema. Pulses: 2+ and symmetric all extremities. Skin: Skin color, texture, turgor normal. No rashes or lesions   Neurologic: CNII-XII intact. No gross focal deficits         Data Review:       Recent Days:  Recent Labs     21  0330 12/10/21  0209 21  0742   WBC 20.3* 23.7* 32.8*   HGB 11.9* 12.6 12.8   HCT 34.3* 36.1* 36.0*   * 454* 475*     Recent Labs     21  0330 12/10/21  0209 21  0742 21  1734 21  1734   * 132* 133*   < > 126*   K 3.6 4.5 4.0   < > 3.6   CL 96* 97 98   < > 87*   CO2 27 27 24   < > 28   GLU 98 100 96   < > 138*   BUN 10 13 13   < > 13   CREA 0.44* 0.59*  0.58* 0.47*   < > 0.78   CA 8.1* 8.6 8.5   < > 9.0   MG  --   --   --   --  2.4   PHOS 3.4 3.4  --   --   --    ALB 1.2* 1.3*  --   --  1.8*   TBILI  --   --   --   --  1.0   ALT  --   --   --   --  56   INR  --   --   --   --  1.2*    < > = values in this interval not displayed.      Recent Labs     21   PH 7.50*   PCO2 34*   PO2 60*   HCO3 26   FIO2 28.0       24 Hour Results:  Recent Results (from the past 24 hour(s))   VANCOMYCIN, TROUGH    Collection Time: 21  3:00 AM   Result Value Ref Range    Vancomycin,trough 12.4 (H) 5.0 - 10.0 ug/mL   CBC WITH AUTOMATED DIFF    Collection Time: 21  3:30 AM   Result Value Ref Range    WBC 20.3 (H) 4.1 - 11.1 K/uL    RBC 4. 23 4. 10 - 5.70 M/uL    HGB 11.9 (L) 12.1 - 17.0 g/dL    HCT 34.3 (L) 36.6 - 50.3 %    MCV 81.1 80.0 - 99.0 FL    MCH 28.1 26.0 - 34.0 PG    MCHC 34.7 30.0 - 36.5 g/dL    RDW 12.7 11.5 - 14.5 %    PLATELET 620 (H) 198 - 400 K/uL    MPV 10.9 8.9 - 12.9 FL    NRBC 0.0 0.0  WBC    ABSOLUTE NRBC 0.00 0.00 - 0.01 K/uL    NEUTROPHILS 81 (H) 32 - 75 %    LYMPHOCYTES 8 (L) 12 - 49 %    MONOCYTES 8 5 - 13 %    EOSINOPHILS 1 0 - 7 %    BASOPHILS 0 0 - 1 %    IMMATURE GRANULOCYTES 2 (H) 0 - 0.5 %    ABS. NEUTROPHILS 16.7 (H) 1.8 - 8.0 K/UL    ABS. LYMPHOCYTES 1.5 0.8 - 3.5 K/UL    ABS. MONOCYTES 1.5 (H) 0.0 - 1.0 K/UL    ABS. EOSINOPHILS 0.1 0.0 - 0.4 K/UL    ABS. BASOPHILS 0.1 0.0 - 0.1 K/UL    ABS. IMM. GRANS. 0.4 (H) 0.00 - 0.04 K/UL    DF AUTOMATED     RENAL FUNCTION PANEL    Collection Time: 12/11/21  3:30 AM   Result Value Ref Range    Sodium 131 (L) 136 - 145 mmol/L    Potassium 3.6 3.5 - 5.1 mmol/L    Chloride 96 (L) 97 - 108 mmol/L    CO2 27 21 - 32 mmol/L    Anion gap 8 5 - 15 mmol/L    Glucose 98 65 - 100 mg/dL    BUN 10 6 - 20 mg/dL    Creatinine 0.44 (L) 0.70 - 1.30 mg/dL    BUN/Creatinine ratio 23 (H) 12 - 20      GFR est AA >60 >60 ml/min/1.73m2    GFR est non-AA >60 >60 ml/min/1.73m2    Calcium 8.1 (L) 8.5 - 10.1 mg/dL    Phosphorus 3.4 2.6 - 4.7 mg/dL    Albumin 1.2 (L) 3.5 - 5.0 g/dL   PROCALCITONIN    Collection Time: 12/11/21  4:30 AM   Result Value Ref Range    Procalcitonin 1.48 (H) 0 ng/mL       XR CHEST PORT   Final Result      IR THORACENTESIS/INSERT CHEST TUBE   Final Result   Ultrasound of the right upper back was performed with images stored in PACS,   demonstrating heterogeneous collection most consistent with emphysema. Successful US-guided 8.5 percutaneous drainage catheter placement into right   empyema. Plan:   Continue flushing the catheter using 10 ml sterile normal saline and record the   output at least once a day.    Consider catheter removal if there is less than 20 mL output over 24 hours. Assessment:  Large right marked multiloculated pleural effusion,  due parapneumonic/empyema    Bilateral community-acquired pneumonia    Sepsis due to above with fever, leukocytosis, elevated procalcitonin and tachycardia    Acute respiratory failure with hypoxia    Tobacco abuse    Alcohol abuse    Hypoalbuminemia  -Albumin 1.2      Plan:  Continue Zosyn  Watch for DTs  Dr. Aiden Cr already spoke with thoracic surgery who will be out for a week. Patient will need IV antibiotics until he is able to undergo a VATS    Care Plan discussed with: Patient/Family and Dr. Aiden Cr    Disposition: Continued inpatient care    Total time spent with patient: 30 minutes.     Nicholas Patience

## 2021-12-11 NOTE — PROGRESS NOTES
Office Visit    Imtiaz Funez is a 46 y.o. male who presents [facility] today for:    HPI He is doing well and looks a little better, fevor down and WBC down to 20k, still drainiang a lot, but CXR looks better, culutres gram variable, some strep as well. Past Medical History:   Diagnosis Date    Asthma     Ill-defined condition        Past Surgical History:   Procedure Laterality Date    IR THORACENTESIS/INSERT CHEST TUBE  12/9/2021       History reviewed. No pertinent family history. Social History     Socioeconomic History    Marital status: UNKNOWN     Spouse name: Not on file    Number of children: Not on file    Years of education: Not on file    Highest education level: Not on file   Occupational History    Not on file   Tobacco Use    Smoking status: Former Smoker    Smokeless tobacco: Never Used   Substance and Sexual Activity    Alcohol use: Yes     Comment: daily drinker     Drug use: Not Currently    Sexual activity: Yes   Other Topics Concern    Not on file   Social History Narrative    Not on file     Social Determinants of Health     Financial Resource Strain:     Difficulty of Paying Living Expenses: Not on file   Food Insecurity:     Worried About Running Out of Food in the Last Year: Not on file    [place] of Food in the Last Year: Not on file   Transportation Needs:     Lack of Transportation (Medical): Not on file    Lack of Transportation (Non-Medical):  Not on file   Physical Activity:     Days of Exercise per Week: Not on file    Minutes of Exercise per Session: Not on file   Stress:     Feeling of Stress : Not on file   Social Connections:     Frequency of Communication with Friends and Family: Not on file    Frequency of Social Gatherings with Friends and Family: Not on file    Attends Buddhism Services: Not on file   [facility] Active Member of Clubs or Organizations: Not on file    Attends Club or Organization Meetings: Not on file    Marital Status: Not on file   Intimate Partner Violence:     Fear of Current or Ex-Partner: Not on file    Emotionally Abused: Not on file    Physically Abused: Not on file    Sexually Abused: Not on file   Housing Stability:     Unable to Pay for Housing in the Last Year: Not on file    Number of [place] in the Last Year: Not on file    Unstable Housing in the Last Year: Not on file       Patient has no known allergies.       Current Facility-Administered Medications   Medication Dose Route Frequency Provider Last Rate Last Admin    albuterol (PROVENTIL HFA, VENTOLIN HFA, PROAIR HFA) inhaler 2 Puff  2 Puff Inhalation Q6HWA RT Colt Caldwell MD   2 Puff at 12/11/21 0853    budesonide-formoteroL (SYMBICORT) 160-4.5 mcg/actuation HFA inhaler 2 Puff  2 Puff Inhalation BID RT Colt Caldwell MD   2 Puff at 12/11/21 0853    0.9% sodium chloride infusion  100 mL/hr IntraVENous CONTINUOUS Toñito Cornejo  mL/hr at 12/11/21 1129 100 mL/hr at 12/11/21 1129    amLODIPine (NORVASC) tablet 5 mg  5 mg Oral DAILY Toñito Cornejo MD   5 mg at 12/11/21 0841    sodium chloride (NS) flush 5-40 mL  5-40 mL IntraVENous Q8H Toñito Cornejo MD   10 mL at 12/11/21 0516    sodium chloride (NS) flush 5-40 mL  5-40 mL IntraVENous PRN Citlaly Webb MD        acetaminophen (TYLENOL) tablet 650 mg  650 mg Oral Q6H PRN Toñito Cornejo MD        Or    acetaminophen (TYLENOL) suppository 650 mg  650 mg Rectal Q6H PRN Toñito Cornejo MD        polyethylene glycol (MIRALAX) packet 17 g  17 g Oral DAILY PRN Toñito Cornejo MD        ondansetron (ZOFRAN ODT) tablet 4 mg  4 mg Oral Q8H PRN Toñito Cornejo MD        Or    ondansetron (ZOFRAN) injection 4 mg  4 mg IntraVENous Q6H PRN Toñito Cornejo MD        enoxaparin (LOVENOX) injection 40 mg  40 mg SubCUTAneous DAILY Tegmaureen, Toñito, MD   40 mg at 12/11/21 7186    piperacillin-tazobactam (ZOSYN) 3.375 g in 0.9% sodium chloride (MBP/ADV) 100 mL MBP  3.375 g IntraVENous Q8H Toñito Cornejo MD 25 mL/hr at 12/11/21 1126 3.375 g at 12/11/21 1126    influenza vaccine 2021-22 (6 mos+)(PF) (FLUARIX/FLULAVAL/FLUZONE QUAD) injection 0.5 mL  1 Each IntraMUSCular PRIOR TO DISCHARGE Toñito Cornejo MD        dextromethorphan (DELSYM) 30 mg/5 mL syrup 30 mg  30 mg Oral Q12H Stacy Ca MD   30 mg at 12/11/21 0840    thiamine mononitrate (B-1) tablet 100 mg  100 mg Oral DAILY Feroz Mccrary MD   100 mg at 12/11/21 0840    0.9% sodium chloride infusion 10 mL  10 mL Irrigation CONTINUOUS Kandy Brennan MD   10 mL at 12/10/21 1030    oxyCODONE IR (ROXICODONE) tablet 5 mg  5 mg Oral Q6H PRN Stacy Ca MD   5 mg at 12/11/21 0840       ROS  Mild shortness of breath      Patient Active Problem List   Diagnosis Code    Pleural effusion J90       /64 (BP 1 Location: Right upper arm, BP Patient Position: At rest)   Pulse 90   Temp 98.6 °F (37 °C)   Resp 16   Ht 5' 11\" (1.803 m)   Wt 162 lb (73.5 kg)   SpO2 95%   BMI 22.59 kg/m²     Physical Exam  Decreased breath sounds on the right     Problem List Items Addressed This Visit        Respiratory    Pleural effusion      Other Visit Diagnoses     Community acquired pneumonia of right lung, unspecified part of lung    -  Primary          Assessment and Plan: Pneumonia with empyema, full cultures pendignn, seems to be slowly improving, but will undoubtly stil require decortication at some point here.  Carlos Sauer MD

## 2021-12-11 NOTE — PROGRESS NOTES
Patient remains with chest tube, low output 20 ml overnight. NC in place 4 liters. Patient remains SOB at rest and on exertion. ABX infused overnight. Vanc held overnight 12.4 on trough. No complaints except for pain and wanting to have surgery as soon as possible. Afebrile. VSS. No skin issues otherwise. Call bell within reach.

## 2021-12-11 NOTE — PROGRESS NOTES
Hospitalist Progress Note               Daily Progress Note: 12/11/2021      Subjective: The patient is seen for follow up. He is a 57-year-old male with history of hypertension and asthma who was transferred from [facility] after he was presented there with shortness of breath and productive cough x2 weeks as well as right-sided pleuritic chest pain. Labs showed a white count of 34,000 and mild lactic acidosis. CT of the chest showed a large multiloculated right pleural effusion as well as a left apical nodule and tree-in-bud centrilobular nodules throughout the left lower lobe. Patient was hypoxic and requiring 4 L nasal cannula. Patient is a chronic smoker    He was started on Zosyn and vancomycin    -------    Patient underwent thoracentesis yesterday with removal of 60 mL of purulent fluid. Count showed a white count of greater than 47,000 with 56% PMNs. Fluid chemistries and Gram stain are still pending.   Procalcitonin was 3.09    Drainage catheter was left in place and it has put out about 275 mL of purulent fluid    Patient notes some interval improvement in the pleuritic pain on the right    White count down to 20 today    Problem List:  Problem List as of 12/11/2021 Never Reviewed          Codes Class Noted - Resolved    Pleural effusion ICD-10-CM: J90  ICD-9-CM: 511.9  12/8/2021 - Present              Medications reviewed  Current Facility-Administered Medications   Medication Dose Route Frequency    albuterol (PROVENTIL HFA, VENTOLIN HFA, PROAIR HFA) inhaler 2 Puff  2 Puff Inhalation Q6HWA RT    budesonide-formoteroL (SYMBICORT) 160-4.5 mcg/actuation HFA inhaler 2 Puff  2 Puff Inhalation BID RT    0.9% sodium chloride infusion  100 mL/hr IntraVENous CONTINUOUS    amLODIPine (NORVASC) tablet 5 mg  5 mg Oral DAILY    sodium chloride (NS) flush 5-40 mL  5-40 mL IntraVENous Q8H    sodium chloride (NS) flush 5-40 mL  5-40 mL IntraVENous PRN    acetaminophen (TYLENOL) tablet 650 mg  650 mg Oral Q6H PRN    Or    acetaminophen (TYLENOL) suppository 650 mg  650 mg Rectal Q6H PRN    polyethylene glycol (MIRALAX) packet 17 g  17 g Oral DAILY PRN    ondansetron (ZOFRAN ODT) tablet 4 mg  4 mg Oral Q8H PRN    Or    ondansetron (ZOFRAN) injection 4 mg  4 mg IntraVENous Q6H PRN    enoxaparin (LOVENOX) injection 40 mg  40 mg SubCUTAneous DAILY    piperacillin-tazobactam (ZOSYN) 3.375 g in 0.9% sodium chloride (MBP/ADV) 100 mL MBP  3.375 g IntraVENous Q8H    influenza vaccine  (6 mos+)(PF) (FLUARIX/FLULAVAL/FLUZONE QUAD) injection 0.5 mL  1 Each IntraMUSCular PRIOR TO DISCHARGE    dextromethorphan (DELSYM) 30 mg/5 mL syrup 30 mg  30 mg Oral Q12H    thiamine mononitrate (B-1) tablet 100 mg  100 mg Oral DAILY    0.9% sodium chloride infusion 10 mL  10 mL Irrigation CONTINUOUS    oxyCODONE IR (ROXICODONE) tablet 5 mg  5 mg Oral Q6H PRN       Review of Systems:   A comprehensive review of systems was negative except for that written in the HPI. Objective:   Physical Exam:     Visit Vitals  /64 (BP 1 Location: Right upper arm, BP Patient Position: At rest)   Pulse 90   Temp 98.6 °F (37 °C)   Resp 16   Ht 5' 11\" (1.803 m)   Wt 73.5 kg (162 lb)   SpO2 95%   BMI 22.59 kg/m²    O2 Flow Rate (L/min): 1 l/min O2 Device: Nasal cannula    Temp (24hrs), Av.1 °F (37.3 °C), Min:98.6 °F (37 °C), Max:99.4 °F (37.4 °C)    701 - 1900  In: -   Out: 1200 [Urine:925; Drains:275]   1901 - 700  In: 4811.7 [P.O.:400; I.V.:4401.7]  Out:  [Urine:3000; Drains:920]    General:   Awake and alert   Lungs:    Decreased breath sounds right lung field   Chest wall:  No tenderness or deformity. Heart:  Regular rate and rhythm, S1, S2 normal, no murmur, click, rub or gallop. Abdomen:   Soft, non-tender. Bowel sounds normal. No masses,  No organomegaly. Extremities: Extremities normal, atraumatic, no cyanosis or edema. Pulses: 2+ and symmetric all extremities.    Skin: Skin color, texture, turgor normal. No rashes or lesions   Neurologic: CNII-XII intact. No gross focal deficits         Data Review:       Recent Days:  Recent Labs     12/11/21  0330 12/10/21  0209 12/09/21  0742   WBC 20.3* 23.7* 32.8*   HGB 11.9* 12.6 12.8   HCT 34.3* 36.1* 36.0*   * 454* 475*     Recent Labs     12/11/21  0330 12/10/21  0209 12/09/21  0742 12/08/21  1734 12/08/21 1734   * 132* 133*   < > 126*   K 3.6 4.5 4.0   < > 3.6   CL 96* 97 98   < > 87*   CO2 27 27 24   < > 28   GLU 98 100 96   < > 138*   BUN 10 13 13   < > 13   CREA 0.44* 0.59*  0.58* 0.47*   < > 0.78   CA 8.1* 8.6 8.5   < > 9.0   MG  --   --   --   --  2.4   PHOS 3.4 3.4  --   --   --    ALB 1.2* 1.3*  --   --  1.8*   TBILI  --   --   --   --  1.0   ALT  --   --   --   --  56   INR  --   --   --   --  1.2*    < > = values in this interval not displayed. Recent Labs     12/08/21  1905   PH 7.50*   PCO2 34*   PO2 60*   HCO3 26   FIO2 28.0       24 Hour Results:  Recent Results (from the past 24 hour(s))   VANCOMYCIN, TROUGH    Collection Time: 12/11/21  3:00 AM   Result Value Ref Range    Vancomycin,trough 12.4 (H) 5.0 - 10.0 ug/mL   CBC WITH AUTOMATED DIFF    Collection Time: 12/11/21  3:30 AM   Result Value Ref Range    WBC 20.3 (H) 4.1 - 11.1 K/uL    RBC 4.23 4. 10 - 5.70 M/uL    HGB 11.9 (L) 12.1 - 17.0 g/dL    HCT 34.3 (L) 36.6 - 50.3 %    MCV 81.1 80.0 - 99.0 FL    MCH 28.1 26.0 - 34.0 PG    MCHC 34.7 30.0 - 36.5 g/dL    RDW 12.7 11.5 - 14.5 %    PLATELET 560 (H) 618 - 400 K/uL    MPV 10.9 8.9 - 12.9 FL    NRBC 0.0 0.0  WBC    ABSOLUTE NRBC 0.00 0.00 - 0.01 K/uL    NEUTROPHILS 81 (H) 32 - 75 %    LYMPHOCYTES 8 (L) 12 - 49 %    MONOCYTES 8 5 - 13 %    EOSINOPHILS 1 0 - 7 %    BASOPHILS 0 0 - 1 %    IMMATURE GRANULOCYTES 2 (H) 0 - 0.5 %    ABS. NEUTROPHILS 16.7 (H) 1.8 - 8.0 K/UL    ABS. LYMPHOCYTES 1.5 0.8 - 3.5 K/UL    ABS. MONOCYTES 1.5 (H) 0.0 - 1.0 K/UL    ABS. EOSINOPHILS 0.1 0.0 - 0.4 K/UL    ABS.  BASOPHILS 0.1 0.0 - 0.1 K/UL    ABS. IMM. GRANS. 0.4 (H) 0.00 - 0.04 K/UL    DF AUTOMATED     RENAL FUNCTION PANEL    Collection Time: 12/11/21  3:30 AM   Result Value Ref Range    Sodium 131 (L) 136 - 145 mmol/L    Potassium 3.6 3.5 - 5.1 mmol/L    Chloride 96 (L) 97 - 108 mmol/L    CO2 27 21 - 32 mmol/L    Anion gap 8 5 - 15 mmol/L    Glucose 98 65 - 100 mg/dL    BUN 10 6 - 20 mg/dL    Creatinine 0.44 (L) 0.70 - 1.30 mg/dL    BUN/Creatinine ratio 23 (H) 12 - 20      GFR est AA >60 >60 ml/min/1.73m2    GFR est non-AA >60 >60 ml/min/1.73m2    Calcium 8.1 (L) 8.5 - 10.1 mg/dL    Phosphorus 3.4 2.6 - 4.7 mg/dL    Albumin 1.2 (L) 3.5 - 5.0 g/dL   PROCALCITONIN    Collection Time: 12/11/21  4:30 AM   Result Value Ref Range    Procalcitonin 1.48 (H) 0 ng/mL       XR CHEST PORT   Final Result      IR THORACENTESIS/INSERT CHEST TUBE   Final Result   Ultrasound of the right upper back was performed with images stored in PACS,   demonstrating heterogeneous collection most consistent with emphysema. Successful US-guided 8.5 percutaneous drainage catheter placement into right   empyema. Plan:   Continue flushing the catheter using 10 ml sterile normal saline and record the   output at least once a day. Consider catheter removal if there is less than 20 mL output over 24 hours. XR CHEST PORT    (Results Pending)        Assessment:  Large right marked multiloculated pleural effusion,  due parapneumonic/empyema    Bilateral community-acquired pneumonia    Sepsis due to above with fever, leukocytosis, elevated procalcitonin and tachycardia    Acute respiratory failure with hypoxia    Tobacco abuse    Alcohol abuse      Plan:  Continue vancomycin and Zosyn    Dr. Mancil Severance already spoke with thoracic surgery who will be out for a week.   Patient will need IV antibiotics until he is able to undergo a VATS    Await remainder of pleural fluid studies including culture      Care Plan discussed with: Patient/Family and  Kg    Disposition: Continued inpatient care    Total time spent with patient: 30 minutes.     Donovan Lacey MD

## 2021-12-11 NOTE — PROGRESS NOTES
Consult  Pulmonary, Critical Care    Name: Liam Mancia MRN: [id number]   : 1970 Hospital: [facility]   Date: 2021  Admission date: 2021 Hospital Day: 4       Subjective/Interval History:   Seen on the medical floor awake alert. History is 2 or so weeks of breathing difficulty. Started with severe chest pain pleuritic with chills and sweats he did not take his temperature but felt hot at times. He has had continued worsening presented to the emergency room has a large right pleural effusion. CT scan suggests multiloculated effusion. Leukocytosis up to 34,000 all consistent with empyema. He is a drinker of 1-2 sixpacks a day. Does drink to the point of falling asleep although he states he is never passed out. 12/10 percutaneous drainage catheter placed by IR yesterday with 1600 cc drainage overnight and so far today 700   chest catheter continues to drain he states he feels a little better. Chest x-ray today has small apical pneumothorax with significant amount of remaining pleural fluid    Hospital Problems  Never Reviewed          Codes Class Noted POA    Pleural effusion ICD-10-CM: J90  ICD-9-CM: 511.9  2021 Unknown              IMPRESSION:   1. Right empyema culture pending probable microaerophilic Streptococcus  2. Aspiration pneumonia left side  3. Nodular density left lung probably aspiration pneumonia have to follow to make sure it clears  4. Hyponatremia unchanged  5. Alcohol abuse  6. COPD exacerbation  Body mass index is 22.59 kg/m². 7.       RECOMMENDATIONS/PLAN:   1. Good drainage from right pleural catheter. We will repeat chest x-ray in a.m. 2. No wheezing will switch nebulized bronchodilators to inhaled  3. Continue IV Zosyn  4. Watch for DTs  5. Have added thiamine  6. Smoking cessation counseling done  7. [x] High complexity decision making was performed  [x] See my orders for details      Subjective/Initial History:      I was asked by Tino Orantes MD to see Edwardo Romero  a 46 y.o.  male in consultation for a chief complaint of multiloculated right pleural effusion likely empyema      No Known Allergies     MAR reviewed and pertinent medications noted or modified as needed     Current Facility-Administered Medications   Medication    albuterol (PROVENTIL HFA, VENTOLIN HFA, PROAIR HFA) inhaler 2 Puff    vancomycin (VANCOCIN) 1,000 mg in 0.9% sodium chloride 250 mL (VIAL-MATE)    budesonide-formoteroL (SYMBICORT) 160-4.5 mcg/actuation HFA inhaler 2 Puff    0.9% sodium chloride infusion    amLODIPine (NORVASC) tablet 5 mg    sodium chloride (NS) flush 5-40 mL    sodium chloride (NS) flush 5-40 mL    acetaminophen (TYLENOL) tablet 650 mg    Or    acetaminophen (TYLENOL) suppository 650 mg    polyethylene glycol (MIRALAX) packet 17 g    ondansetron (ZOFRAN ODT) tablet 4 mg    Or    ondansetron (ZOFRAN) injection 4 mg    enoxaparin (LOVENOX) injection 40 mg    piperacillin-tazobactam (ZOSYN) 3.375 g in 0.9% sodium chloride (MBP/ADV) 100 mL MBP    influenza vaccine 2021-22 (6 mos+)(PF) (FLUARIX/FLULAVAL/FLUZONE QUAD) injection 0.5 mL    Vancomycin Pharmacy Dosing    dextromethorphan (DELSYM) 30 mg/5 mL syrup 30 mg    thiamine mononitrate (B-1) tablet 100 mg    0.9% sodium chloride infusion 10 mL    oxyCODONE IR (ROXICODONE) tablet 5 mg      Patient PCP: None  PMH:  has a past medical history of Asthma and Ill-defined condition. PSH:   has a past surgical history that includes ir thoracentesis/insert chest tube (12/9/2021). FHX: family history is not on file. SHX:  reports that he has quit smoking. He has never used smokeless tobacco. He reports current alcohol use. He reports previous drug use. To me he admits smoking 1 to 2 packs/day ever since he was 8 or 10.   He drinks 1-2 sixpacks a day beer     Systemic review:  General states his weight stable has been having chills and fever for the last 2 weeks or more  Eyes no double vision or momentary blindness  ENT no drainage or facial pain  Musculoskeletal no swollen tender joints  Endocrinologic no polyuria polydipsia  Neurologic no seizures or syncope  Gastrointestinal no nausea vomiting acid indigestion. Genitourinary no pain or discomfort on urination  Cardiovascular no history of heart disease or ankle edema he has had the pleuritic chest pain no diaphoresis  Respiratory as mentioned cough some yellow sputum fever chills shortness of breath and pleuritic pain on the right    Objective:     Vital Signs: Telemetry:    normal sinus rhythm Intake/Output:   Visit Vitals  /64 (BP 1 Location: Right upper arm, BP Patient Position: At rest)   Pulse 90   Temp 98.6 °F (37 °C)   Resp 16   Ht 5' 11\" (1.803 m)   Wt 73.5 kg (162 lb)   SpO2 95%   BMI 22.59 kg/m²       Temp (24hrs), Av.1 °F (37.3 °C), Min:98.6 °F (37 °C), Max:99.4 °F (37.4 °C)        O2 Device: Nasal cannula O2 Flow Rate (L/min): 1 l/min       Wt Readings from Last 4 Encounters:   21 73.5 kg (162 lb)   21 73.5 kg (162 lb)          Intake/Output Summary (Last 24 hours) at 2021 1121  Last data filed at 2021 0854  Gross per 24 hour   Intake 4811.66 ml   Output 2495 ml   Net 2316.66 ml       Last shift:      701 - 1900  In: -   Out: 975 [Urine:925; Drains:50]  Last 3 shifts: 1901 - 700  In: 4811.7 [P.O.:400; I.V.:4401.7]  Out: 36 [Urine:3000; Drains:920]       Physical Exam:   General:  male; lying in bed no distress no accessory muscle usage  HEENT: NCAT, poor dentition,  Eyes: anicteric; conjunctiva clear extraocular movements intact  Neck: no nodes, no JVD, no accessory MM use. Chest: no deformity,   Cardiac: Regular rate and rhythm  Lungs: Markedly improved breath sounds on the right present anteriorly and upper lung posteriorly still no breath sounds in the right base.   Exhalation is coarse on the left side but no definite wheezing  Abd: Thin soft positive bowel sounds  Ext: no edema; no joint swelling; No clubbing  : clear urine  Neuro: Awake alert calm speech is clear moves all 4 extremities  Psych- no agitation, oriented to person;   Skin: warm, dry, no cyanosis;   Pulses: Brachial radial pulses intact  Capillary: Normal capillary refill    Labs:    Recent Labs     12/11/21  0330 12/10/21  0209 12/09/21  0742 12/08/21  1734 12/08/21  1734   WBC 20.3* 23.7* 32.8*   < > 34.4*   HGB 11.9* 12.6 12.8   < > 13.8   * 454* 475*   < > 459*   INR  --   --   --   --  1.2*    < > = values in this interval not displayed. Recent Labs     12/11/21  0330 12/10/21  0209 12/09/21  0742 12/08/21  1734 12/08/21  1734   * 132* 133*   < > 126*   K 3.6 4.5 4.0   < > 3.6   CL 96* 97 98   < > 87*   CO2 27 27 24   < > 28   GLU 98 100 96   < > 138*   BUN 10 13 13   < > 13   CREA 0.44* 0.59*  0.58* 0.47*   < > 0.78   CA 8.1* 8.6 8.5   < > 9.0   MG  --   --   --   --  2.4   PHOS 3.4 3.4  --   --   --    LAC  --   --  1.6  --  2.2*   ALB 1.2* 1.3*  --   --  1.8*   ALT  --   --   --   --  56    < > = values in this interval not displayed. Recent Labs     12/08/21  1905   PH 7.50*   PCO2 34*   PO2 60*   HCO3 26   FIO2 28.0   Pleural fluid WBC count 47,840 with 56% polys 32% lymphs pleural fluid protein 0.8 glucose 4  Pleural culture with probable microaerophilic Streptococcus    Lab Results   Component Value Date/Time    Culture result: Heavy Probable Microaerophilic streptococcus 59/13/9998 11:30 AM    Culture result: No growth 1 day 12/09/2021 07:42 AM    Culture result: No growth 3 days 12/08/2021 08:45 PM     Imaging:    CXR Results  (Last 48 hours)               12/11/21 0752  XR CHEST PORT Final result    Narrative:  Chest single view. Comparison single view chest December 8, 2021. Right-sided hydropneumothorax. Estimated moderate volume pleural fluid. 1.1 cm   apical pleural separation. Left lung aerated.  Cardiac and mediastinal structures   unchanged. Results from [place] encounter on 12/08/21    XR CHEST PORT    Narrative  Chest single view. Comparison single view chest December 8, 2021. Right-sided hydropneumothorax. Estimated moderate volume pleural fluid. 1.1 cm  apical pleural separation. Left lung aerated. Cardiac and mediastinal structures  unchanged. Results from Hospital Encounter encounter on 12/08/21    XR CHEST PORT    Narrative  XR CHEST PORT    Comparison: None    Impression  FINDINGS/IMPRESSION:  Complex pleural parenchymal disease throughout the right hemithorax. Recommend  Chest CT. Results from [place] encounter on 12/08/21    CTA CHEST W OR W WO CONT    Narrative  Chest CTA    TECHNIQUE: Multiple continuous axial images were obtained from the thoracic  inlet to the upper abdomen after the uneventful administration of intravenous  contrast. Reformatted images as well as maximum intensity projection images were  obtained in the sagittal and coronal planes. Comment on dose reduction: All CT scans at this facility are performed using  dose reduction optimization technique as appropriate to perform the exam  including the following; automated exposure control, adjustments of the mA  and/or kV according to patient size, or use of iterative reconstructed  technique. Comparison examination: Chest radiograph dated same day    Findings:  LYMPHADENOPATHY: There is no axillary, mediastinal, or hilar lymphadenopathy by  CT size criteria. CARDIOVASCULAR: Heart normal in size. Thoracic aorta normal in caliber. LUNGS AND PLEURA: Large multiloculated right pleural effusion with subsequent  near complete atelectasis of the right lung. Tree-in-bud centrilobular nodules  present throughout the left lower lobe with larger nodular airspace opacity  apical posterior segment left upper lobe. Findings consistent with  bronchiolitis/bronchopneumonia.  The larger airspace nodule in the apical  posterior segment left upper lobe require surveillance. INCLUDED ABDOMEN: The visualized upper abdomen images normally. CHEST WALL: Degenerative changes are present throughout the thoracic spine. No  suspicious lytic or blastic lesion is identified. Impression  Large multiloculated right pleural effusion with subsequent near complete  atelectasis of the right lung. Therapeutic and diagnostic thoracentesis  recommended. Tree-in-bud centrilobular nodules present throughout the left lower lobe with  larger nodular airspace opacity apical posterior segment left upper lobe. Findings consistent with bronchiolitis/bronchopneumonia. The larger airspace  nodule in the apical posterior segment left upper lobe require surveillance. · 12/9 discussion 2-week history of pleuritic pain cough chills sweats fever and a drinker who will drink to the point of falling asleep most likely has aspiration pneumonia with empyema. IR has been consulted for joellen peters.   He very likely will have multiple loculations preventing drainage and may need VATS  · 12/11 feels fine respirations nonlabored has just an occasional air bubble when he coughs chest x-ray with small apical pneumothorax significant fluid remains although it is certainly improved culture consistent with oral portillo to remain on Zosyn WBC count improved we will discontinue vancomycin    Oswald Baires MD

## 2021-12-11 NOTE — PROGRESS NOTES
Vancomycin - 12/11/2021    Vancomycin level from 0300 today resulted 12.4 is therapeutic. Goal -600 and trough 10-15. Patient currently receiving 1000mg q8hr. RN inappropriately held 0400 dose, not clear why. Last dose was 12/10 @1949. patient is likely subtherapeutic at this time. Will resume 1000mg q8hr regimen at 1200 today and continue to monitor. Next level ordered for 1000 on 12/13    For questions regarding vancomycin levels or dosing please do not hesitate to contact pharmacy dept. Pharmacist will continue to follow patient daily and make adjustments based on clinical status.     Alfredo AyalaD

## 2021-12-12 ENCOUNTER — APPOINTMENT (OUTPATIENT)
Dept: GENERAL RADIOLOGY | Age: 51
DRG: 853 | End: 2021-12-12
Attending: INTERNAL MEDICINE
Payer: COMMERCIAL

## 2021-12-12 LAB
BACTERIA SPEC CULT: NORMAL
GLUCOSE BLD STRIP.AUTO-MCNC: 95 MG/DL (ref 65–117)
GRAM STN SPEC: NORMAL
PERFORMED BY, TECHID: NORMAL
SPECIAL REQUESTS,SREQ: NORMAL

## 2021-12-12 PROCEDURE — 94760 N-INVAS EAR/PLS OXIMETRY 1: CPT

## 2021-12-12 PROCEDURE — 74011250637 HC RX REV CODE- 250/637: Performed by: INTERNAL MEDICINE

## 2021-12-12 PROCEDURE — 74011000258 HC RX REV CODE- 258: Performed by: INTERNAL MEDICINE

## 2021-12-12 PROCEDURE — 74011250636 HC RX REV CODE- 250/636: Performed by: INTERNAL MEDICINE

## 2021-12-12 PROCEDURE — 82962 GLUCOSE BLOOD TEST: CPT

## 2021-12-12 PROCEDURE — 94640 AIRWAY INHALATION TREATMENT: CPT

## 2021-12-12 PROCEDURE — 65270000029 HC RM PRIVATE

## 2021-12-12 PROCEDURE — 71045 X-RAY EXAM CHEST 1 VIEW: CPT

## 2021-12-12 PROCEDURE — 77010033678 HC OXYGEN DAILY

## 2021-12-12 RX ADMIN — ALBUTEROL SULFATE 2 PUFF: 108 INHALANT RESPIRATORY (INHALATION) at 14:41

## 2021-12-12 RX ADMIN — AMLODIPINE BESYLATE 5 MG: 5 TABLET ORAL at 10:16

## 2021-12-12 RX ADMIN — SODIUM CHLORIDE 100 ML/HR: 9 INJECTION, SOLUTION INTRAVENOUS at 02:44

## 2021-12-12 RX ADMIN — BUDESONIDE AND FORMOTEROL FUMARATE DIHYDRATE 2 PUFF: 160; 4.5 AEROSOL RESPIRATORY (INHALATION) at 08:06

## 2021-12-12 RX ADMIN — SODIUM CHLORIDE 100 ML/HR: 9 INJECTION, SOLUTION INTRAVENOUS at 10:09

## 2021-12-12 RX ADMIN — BUDESONIDE AND FORMOTEROL FUMARATE DIHYDRATE 2 PUFF: 160; 4.5 AEROSOL RESPIRATORY (INHALATION) at 19:47

## 2021-12-12 RX ADMIN — DEXTROMETHORPHAN 30 MG: 30 SUSPENSION, EXTENDED RELEASE ORAL at 10:14

## 2021-12-12 RX ADMIN — ALBUTEROL SULFATE 2 PUFF: 108 INHALANT RESPIRATORY (INHALATION) at 08:06

## 2021-12-12 RX ADMIN — PIPERACILLIN SODIUM AND TAZOBACTAM SODIUM: 3; .375 INJECTION, POWDER, LYOPHILIZED, FOR SOLUTION INTRAVENOUS at 10:14

## 2021-12-12 RX ADMIN — ALBUTEROL SULFATE 2 PUFF: 108 INHALANT RESPIRATORY (INHALATION) at 19:47

## 2021-12-12 RX ADMIN — PIPERACILLIN SODIUM AND TAZOBACTAM SODIUM 3.38 G: 3; .375 INJECTION, POWDER, LYOPHILIZED, FOR SOLUTION INTRAVENOUS at 02:43

## 2021-12-12 RX ADMIN — PIPERACILLIN SODIUM AND TAZOBACTAM SODIUM 3.38 G: 3; .375 INJECTION, POWDER, LYOPHILIZED, FOR SOLUTION INTRAVENOUS at 17:58

## 2021-12-12 RX ADMIN — Medication 10 ML: at 10:17

## 2021-12-12 RX ADMIN — Medication 10 ML: at 13:18

## 2021-12-12 RX ADMIN — ENOXAPARIN SODIUM 40 MG: 100 INJECTION SUBCUTANEOUS at 10:15

## 2021-12-12 RX ADMIN — THIAMINE HCL TAB 100 MG 100 MG: 100 TAB at 10:15

## 2021-12-12 RX ADMIN — ACETAMINOPHEN 650 MG: 325 TABLET ORAL at 10:15

## 2021-12-12 RX ADMIN — DEXTROMETHORPHAN 30 MG: 30 SUSPENSION, EXTENDED RELEASE ORAL at 22:29

## 2021-12-12 RX ADMIN — Medication 10 ML: at 22:29

## 2021-12-12 NOTE — PROGRESS NOTES
Hospitalist Progress Note               Daily Progress Note: 12/12/2021      Subjective: The patient is seen for follow up. He is a 43-year-old male with history of hypertension and asthma who was transferred from [facility] after he was presented there with shortness of breath and productive cough x2 weeks as well as right-sided pleuritic chest pain. Labs showed a white count of 34,000 and mild lactic acidosis. CT of the chest showed a large multiloculated right pleural effusion as well as a left apical nodule and tree-in-bud centrilobular nodules throughout the left lower lobe. Patient was hypoxic and requiring 4 L nasal cannula. Patient is a chronic smoker    He was started on Zosyn and vancomycin    -------  Today, patient seen at bedside, resting in mild distress due to discomfort. Patient did not want to eat breakfast this morning. He seems to be doing better other than the discomfort from catheter in place. He notes pain in epigastric area with cough. He denies chest pain, dizziness, shortness of breath, dyspnea. He noted ambulating to the bathroom. He would like to let his job [employer] in [place], [place] of his situation at the hospital so that he could get on disability. Catheter still draining, but CXR is better.     Blood culture no growth for 4 and 2 day. 3+ gram variable rods, 2+ WBC, >100 RBC, heavy probably microaerophilic streptococcus in drained pleural fluid analysis. Repeat CXR showed right-sided hydropneumothorax, 1.1 cm apical pleural separation. White count was down to 20 yesterday.           Problem List:  Problem List as of 12/12/2021 Never Reviewed          Codes Class Noted - Resolved    Pleural effusion ICD-10-CM: J90  ICD-9-CM: 511.9  12/8/2021 - Present              Medications reviewed  Current Facility-Administered Medications   Medication Dose Route Frequency    albuterol (PROVENTIL HFA, VENTOLIN HFA, PROAIR HFA) inhaler 2 Puff  2 Puff Inhalation Q6HWA RT    budesonide-formoteroL (SYMBICORT) 160-4.5 mcg/actuation HFA inhaler 2 Puff  2 Puff Inhalation BID RT    0.9% sodium chloride infusion  100 mL/hr IntraVENous CONTINUOUS    amLODIPine (NORVASC) tablet 5 mg  5 mg Oral DAILY    sodium chloride (NS) flush 5-40 mL  5-40 mL IntraVENous Q8H    sodium chloride (NS) flush 5-40 mL  5-40 mL IntraVENous PRN    acetaminophen (TYLENOL) tablet 650 mg  650 mg Oral Q6H PRN    Or    acetaminophen (TYLENOL) suppository 650 mg  650 mg Rectal Q6H PRN    polyethylene glycol (MIRALAX) packet 17 g  17 g Oral DAILY PRN    ondansetron (ZOFRAN ODT) tablet 4 mg  4 mg Oral Q8H PRN    Or    ondansetron (ZOFRAN) injection 4 mg  4 mg IntraVENous Q6H PRN    enoxaparin (LOVENOX) injection 40 mg  40 mg SubCUTAneous DAILY    piperacillin-tazobactam (ZOSYN) 3.375 g in 0.9% sodium chloride (MBP/ADV) 100 mL MBP  3.375 g IntraVENous Q8H    influenza vaccine  (6 mos+)(PF) (FLUARIX/FLULAVAL/FLUZONE QUAD) injection 0.5 mL  1 Each IntraMUSCular PRIOR TO DISCHARGE    dextromethorphan (DELSYM) 30 mg/5 mL syrup 30 mg  30 mg Oral Q12H    thiamine mononitrate (B-1) tablet 100 mg  100 mg Oral DAILY    0.9% sodium chloride infusion 10 mL  10 mL Irrigation CONTINUOUS    oxyCODONE IR (ROXICODONE) tablet 5 mg  5 mg Oral Q6H PRN       Review of Systems:   A comprehensive review of systems was negative except for that written in the HPI. Objective:   Physical Exam:     Visit Vitals  /61 (BP 1 Location: Left upper arm, BP Patient Position: Sitting)   Pulse 92   Temp 98.6 °F (37 °C)   Resp 17   Ht 5' 11\" (1.803 m)   Wt 73.5 kg (162 lb)   SpO2 95%   BMI 22.59 kg/m²    O2 Flow Rate (L/min): 1 l/min O2 Device: Nasal cannula    Temp (24hrs), Av.1 °F (37.3 °C), Min:98.6 °F (37 °C), Max:100 °F (37.8 °C)    701 - 1900  In: -   Out: 70 [Drains:70]   12/10 1901 - 700  In: 4811.7 [P.O.:400;  I.V.:4401.7]  Out: 7594 [Urine:2125; Drains:345]    General:   Awake and alert   Lungs: Decreased breath sounds right lung field   Chest wall:  No tenderness or deformity. Heart:  Regular rate and rhythm, S1, S2 normal, no murmur, click, rub or gallop. Abdomen:   Soft, non-tender. Bowel sounds normal. No masses,  No organomegaly. Extremities: Extremities normal, atraumatic, no cyanosis or edema. Pulses: 2+ and symmetric all extremities. Skin: Skin color, texture, turgor normal. No rashes or lesions   Neurologic: CNII-XII intact. No gross focal deficits         Data Review:       Recent Days:  Recent Labs     12/11/21  0330 12/10/21  0209   WBC 20.3* 23.7*   HGB 11.9* 12.6   HCT 34.3* 36.1*   * 454*     Recent Labs     12/11/21  0330 12/10/21  0209   * 132*   K 3.6 4.5   CL 96* 97   CO2 27 27   GLU 98 100   BUN 10 13   CREA 0.44* 0.59*  0.58*   CA 8.1* 8.6   PHOS 3.4 3.4   ALB 1.2* 1.3*     No results for input(s): PH, PCO2, PO2, HCO3, FIO2 in the last 72 hours. 24 Hour Results:  No results found for this or any previous visit (from the past 24 hour(s)). XR CHEST PORT   Final Result      IR THORACENTESIS/INSERT CHEST TUBE   Final Result   Ultrasound of the right upper back was performed with images stored in PACS,   demonstrating heterogeneous collection most consistent with emphysema. Successful US-guided 8.5 percutaneous drainage catheter placement into right   empyema. Plan:   Continue flushing the catheter using 10 ml sterile normal saline and record the   output at least once a day. Consider catheter removal if there is less than 20 mL output over 24 hours.                   XR CHEST PORT    (Results Pending)        Assessment:  Large right marked multiloculated pleural effusion,  due parapneumonic/empyema    Bilateral community-acquired pneumonia    Sepsis due to above with fever, leukocytosis, elevated procalcitonin and tachycardia    Acute respiratory failure with hypoxia    Tobacco abuse    Alcohol abuse      Plan:  Continue vancomycin and Zosyn Dr. Launie Klinefelter already spoke with thoracic surgery who will be out for a week. Patient will need IV antibiotics until he is able to undergo a VATS    Obtain repeat electrolytes      Care Plan discussed with: Patient/Family and Dr. Launie Klinefelter    Disposition: Continued inpatient care    Total time spent with patient: 30 minutes.     Cecelia Luciano MD

## 2021-12-12 NOTE — PROGRESS NOTES
Hospitalist Progress Note               Daily Progress Note: 12/12/2021      Subjective: The patient is seen for follow up. He is a 75-year-old male with history of hypertension and asthma who was transferred from [facility] after he was presented there with shortness of breath and productive cough x2 weeks as well as right-sided pleuritic chest pain. Labs showed a white count of 34,000 and mild lactic acidosis. CT of the chest showed a large multiloculated right pleural effusion as well as a left apical nodule and tree-in-bud centrilobular nodules throughout the left lower lobe. Patient was hypoxic and requiring 4 L nasal cannula. Patient is a chronic smoker    He was started on Zosyn and vancomycin    -------  Today, patient seen at bedside, resting in mild distress due to discomfort. Patient did not want to eat breakfast this morning. He seems to be doing better other than the discomfort from catheter in place. He notes pain in epigastric area with cough. He denies chest pain, dizziness, shortness of breath, dyspnea. He noted ambulating to the bathroom. He would like to let his job [employer] in [address] of his situation at the hospital so that he could get on disability. Catheter still draining, but CXR is better. Blood culture no growth for 4 and 2 day. 3+ gram variable rods, 2+ WBC, >100 RBC, heavy probably microaerophilic streptococcus in drained pleural fluid analysis. Repeat CXR showed right-sided hydropneumothorax, 1.1 cm apical pleural separation. White count was down to 20 yesterday.     Problem List:  Problem List as of 12/12/2021 Never Reviewed          Codes Class Noted - Resolved    Pleural effusion ICD-10-CM: J90  ICD-9-CM: 511.9  12/8/2021 - Present              Medications reviewed  Current Facility-Administered Medications   Medication Dose Route Frequency    albuterol (PROVENTIL HFA, VENTOLIN HFA, PROAIR HFA) inhaler 2 Puff  2 Puff Inhalation Q6HWA RT    budesonide-formoteroL (SYMBICORT) 160-4.5 mcg/actuation HFA inhaler 2 Puff  2 Puff Inhalation BID RT    0.9% sodium chloride infusion  100 mL/hr IntraVENous CONTINUOUS    amLODIPine (NORVASC) tablet 5 mg  5 mg Oral DAILY    sodium chloride (NS) flush 5-40 mL  5-40 mL IntraVENous Q8H    sodium chloride (NS) flush 5-40 mL  5-40 mL IntraVENous PRN    acetaminophen (TYLENOL) tablet 650 mg  650 mg Oral Q6H PRN    Or    acetaminophen (TYLENOL) suppository 650 mg  650 mg Rectal Q6H PRN    polyethylene glycol (MIRALAX) packet 17 g  17 g Oral DAILY PRN    ondansetron (ZOFRAN ODT) tablet 4 mg  4 mg Oral Q8H PRN    Or    ondansetron (ZOFRAN) injection 4 mg  4 mg IntraVENous Q6H PRN    enoxaparin (LOVENOX) injection 40 mg  40 mg SubCUTAneous DAILY    piperacillin-tazobactam (ZOSYN) 3.375 g in 0.9% sodium chloride (MBP/ADV) 100 mL MBP  3.375 g IntraVENous Q8H    influenza vaccine  (6 mos+)(PF) (FLUARIX/FLULAVAL/FLUZONE QUAD) injection 0.5 mL  1 Each IntraMUSCular PRIOR TO DISCHARGE    dextromethorphan (DELSYM) 30 mg/5 mL syrup 30 mg  30 mg Oral Q12H    thiamine mononitrate (B-1) tablet 100 mg  100 mg Oral DAILY    0.9% sodium chloride infusion 10 mL  10 mL Irrigation CONTINUOUS    oxyCODONE IR (ROXICODONE) tablet 5 mg  5 mg Oral Q6H PRN       Review of Systems:   A comprehensive review of systems was negative except for that written in the HPI. Objective:   Physical Exam:     Visit Vitals  /63 (BP 1 Location: Left upper arm, BP Patient Position: At rest)   Pulse 88   Temp 98.7 °F (37.1 °C)   Resp 20   Ht 5' 11\" (1.803 m)   Wt 162 lb (73.5 kg)   SpO2 94%   BMI 22.59 kg/m²    O2 Flow Rate (L/min): 1 l/min O2 Device: Nasal cannula    Temp (24hrs), Av.2 °F (37.3 °C), Min:98.7 °F (37.1 °C), Max:100 °F (37.8 °C)    701 - 1900  In: -   Out: 70 [Drains:70]   12/10 1901 - 700  In: 4811.7 [P.O.:400;  I.V.:4401.7]  Out: 8402 [Urine:2125; Drains:345]    General:   Awake and alert Lungs:    Decreased breath sounds right lung field   Chest wall:  No tenderness or deformity. Heart:  Regular rate and rhythm, S1, S2 normal, no murmur, click, rub or gallop. Abdomen:   Soft, non-tender. Bowel sounds normal. No masses,  No organomegaly. Extremities: Extremities normal, atraumatic, no cyanosis or edema. Pulses: 2+ and symmetric all extremities. Skin: Skin color, texture, turgor normal. No rashes or lesions   Neurologic: CNII-XII intact. No gross focal deficits         Data Review:       Recent Days:  Recent Labs     12/11/21 0330 12/10/21  0209   WBC 20.3* 23.7*   HGB 11.9* 12.6   HCT 34.3* 36.1*   * 454*     Recent Labs     12/11/21  0330 12/10/21  0209   * 132*   K 3.6 4.5   CL 96* 97   CO2 27 27   GLU 98 100   BUN 10 13   CREA 0.44* 0.59*  0.58*   CA 8.1* 8.6   PHOS 3.4 3.4   ALB 1.2* 1.3*     No results for input(s): PH, PCO2, PO2, HCO3, FIO2 in the last 72 hours. 24 Hour Results:  No results found for this or any previous visit (from the past 24 hour(s)). XR CHEST PORT   Final Result      IR THORACENTESIS/INSERT CHEST TUBE   Final Result   Ultrasound of the right upper back was performed with images stored in PACS,   demonstrating heterogeneous collection most consistent with emphysema. Successful US-guided 8.5 percutaneous drainage catheter placement into right   empyema. Plan:   Continue flushing the catheter using 10 ml sterile normal saline and record the   output at least once a day. Consider catheter removal if there is less than 20 mL output over 24 hours.                   XR CHEST PORT    (Results Pending)        Assessment:  Large right marked multiloculated pleural effusion,  due parapneumonic/empyema, catheter in place, draining, slow improvement     Bilateral community-acquired pneumonia     Sepsis due to above with fever, leukocytosis, elevated procalcitonin and tachycardia     Acute respiratory failure with hypoxia     Tobacco abuse     Alcohol abuse        Plan:  Continue vancomycin and Zosyn     Dr. Kamaljit Escobar already spoke with thoracic surgery who will be out for a week. Patient will need IV antibiotics until he is able to undergo a VATS        Care Plan discussed with: Patient/Family and Dr. Kamaljit Escobar     Disposition: Continued inpatient care     Total time spent with patient: 30 minutes.     Faith Naranjo

## 2021-12-12 NOTE — PROGRESS NOTES
Consult  Pulmonary, Critical Care    Name: Loletha Phalen MRN: [id number]   : 1970 Hospital: [facility]   Date: 2021  Admission date: 2021 Hospital Day: 5       Subjective/Interval History:   Seen on the medical floor awake alert. History is 2 or so weeks of breathing difficulty. Started with severe chest pain pleuritic with chills and sweats he did not take his temperature but felt hot at times. He has had continued worsening presented to the emergency room has a large right pleural effusion. CT scan suggests multiloculated effusion. Leukocytosis up to 34,000 all consistent with empyema. He is a drinker of 1-2 sixpacks a day. Does drink to the point of falling asleep although he states he is never passed out. 12/10 percutaneous drainage catheter placed by IR yesterday with 1600 cc drainage overnight and so far today 700   chest catheter continues to drain he states he feels a little better. Chest x-ray today has small apical pneumothorax with significant amount of remaining pleural fluid   awake alert states he feels better    Hospital Problems  Never Reviewed          Codes Class Noted POA    Pleural effusion ICD-10-CM: J90  ICD-9-CM: 511.9  2021 Unknown              IMPRESSION:   1. Right empyema culture  microaerophilic Streptococcus  2. Aspiration pneumonia  3. Nodular density left lung probably aspiration pneumonia have to follow to make sure it clears  4. Hyponatremia unchanged  5. Alcohol abuse  6. COPD exacerbation  Body mass index is 22.59 kg/m². 7.       RECOMMENDATIONS/PLAN:   1. Good drainage from right pleural catheter. Chest x-ray continues to improve  2. No wheezing  bronchodilators now inhalers  3. Continue IV Zosyn  4. No evidence of developing DTs  5. Continue thiamine  6. Smoking cessation counseling done  7.            [x] High complexity decision making was performed  [x] See my orders for details      Subjective/Initial History:     I was asked by Zulma Kussmaul, MD to see Quinton Ramirez  a 46 y.o.  male in consultation for a chief complaint of multiloculated right pleural effusion likely empyema      No Known Allergies     MAR reviewed and pertinent medications noted or modified as needed     Current Facility-Administered Medications   Medication    albuterol (PROVENTIL HFA, VENTOLIN HFA, PROAIR HFA) inhaler 2 Puff    budesonide-formoteroL (SYMBICORT) 160-4.5 mcg/actuation HFA inhaler 2 Puff    0.9% sodium chloride infusion    amLODIPine (NORVASC) tablet 5 mg    sodium chloride (NS) flush 5-40 mL    sodium chloride (NS) flush 5-40 mL    acetaminophen (TYLENOL) tablet 650 mg    Or    acetaminophen (TYLENOL) suppository 650 mg    polyethylene glycol (MIRALAX) packet 17 g    ondansetron (ZOFRAN ODT) tablet 4 mg    Or    ondansetron (ZOFRAN) injection 4 mg    enoxaparin (LOVENOX) injection 40 mg    piperacillin-tazobactam (ZOSYN) 3.375 g in 0.9% sodium chloride (MBP/ADV) 100 mL MBP    influenza vaccine 2021-22 (6 mos+)(PF) (FLUARIX/FLULAVAL/FLUZONE QUAD) injection 0.5 mL    dextromethorphan (DELSYM) 30 mg/5 mL syrup 30 mg    thiamine mononitrate (B-1) tablet 100 mg    0.9% sodium chloride infusion 10 mL    oxyCODONE IR (ROXICODONE) tablet 5 mg      Patient PCP: None  PMH:  has a past medical history of Asthma and Ill-defined condition. PSH:   has a past surgical history that includes ir thoracentesis/insert chest tube (12/9/2021). FHX: family history is not on file. SHX:  reports that he has quit smoking. He has never used smokeless tobacco. He reports current alcohol use. He reports previous drug use. To me he admits smoking 1 to 2 packs/day ever since he was 8 or 10.   He drinks 1-2 sixpacks a day beer     Systemic review:  General states his weight stable has been having chills and fever for the last 2 weeks or more  Eyes no double vision or momentary blindness  ENT no drainage or facial pain  Musculoskeletal no swollen tender joints  Endocrinologic no polyuria polydipsia  Neurologic no seizures or syncope  Gastrointestinal no nausea vomiting acid indigestion. Genitourinary no pain or discomfort on urination  Cardiovascular no history of heart disease or ankle edema he has had the pleuritic chest pain no diaphoresis  Respiratory as mentioned cough some yellow sputum fever chills shortness of breath and pleuritic pain on the right    Objective:     Vital Signs: Telemetry:    normal sinus rhythm Intake/Output:   Visit Vitals  /61 (BP 1 Location: Left upper arm, BP Patient Position: Sitting)   Pulse 92   Temp 98.6 °F (37 °C)   Resp 17   Ht 5' 11\" (1.803 m)   Wt 73.5 kg (162 lb)   SpO2 95%   BMI 22.59 kg/m²       Temp (24hrs), Av.1 °F (37.3 °C), Min:98.6 °F (37 °C), Max:100 °F (37.8 °C)        O2 Device: Nasal cannula O2 Flow Rate (L/min): 1 l/min       Wt Readings from Last 4 Encounters:   21 73.5 kg (162 lb)   21 73.5 kg (162 lb)          Intake/Output Summary (Last 24 hours) at 2021 1729  Last data filed at 2021 1014  Gross per 24 hour   Intake    Output 1546 ml   Net -1546 ml       Last shift:       07 -  190  In: -   Out: 0434 [Urine:1025; Drains:70]  Last 3 shifts: 12/10 1901 -  0700  In: 4811.7 [P.O.:400; I.V.:4401.7]  Out: 2368 [Urine:2125; Drains:345]       Physical Exam:   General:  male; lying in bed no distress no accessory muscle usage  HEENT: NCAT, poor dentition,  Eyes: anicteric; conjunctiva clear extraocular movements intact  Neck: no nodes, no JVD, no accessory MM use. Chest: no deformity,   Cardiac: Regular rate and rhythm  Lungs: Markedly improved breath sounds on the right present anteriorly and upper lung posteriorly and developing some in the right base now, left side fairly clear  Abd: Thin soft positive bowel sounds  Ext: no edema; no joint swelling;  No clubbing  : clear urine  Neuro: Awake alert calm speech is clear moves all 4 extremities  Psych- no agitation, oriented to person;   Skin: warm, dry, no cyanosis;   Pulses: Brachial radial pulses intact  Capillary: Normal capillary refill    Labs:    Recent Labs     12/11/21  0330 12/10/21  0209   WBC 20.3* 23.7*   HGB 11.9* 12.6   * 454*     Recent Labs     12/11/21  0330 12/10/21  0209   * 132*   K 3.6 4.5   CL 96* 97   CO2 27 27   GLU 98 100   BUN 10 13   CREA 0.44* 0.59*  0.58*   CA 8.1* 8.6   PHOS 3.4 3.4   ALB 1.2* 1.3*   . Pleural fluid WBC count 47,840 with 56% polys 32% lymphs pleural fluid protein 0.8 glucose 4  Pleural culture with  microaerophilic Streptococcus    Lab Results   Component Value Date/Time    Culture result: Heavy Streptococcus anginosus 12/09/2021 11:30 AM    Culture result: No growth 2 days 12/09/2021 07:42 AM    Culture result: No growth 4 days 12/08/2021 08:45 PM     Imaging:    CXR Results  (Last 48 hours)               12/12/21 0854  XR CHEST PORT Final result    Narrative:  Chest single view. Comparison single view chest December 11, 2021. Similar appearance for the right hemithorax. Predominant right basilar airspace opacity, similar volume. Unchanged hydropneumothorax. Similar degree apical pleural separation. Left lung aerated. Cardiac and mediastinal structures unchanged. To me the x-ray is improved you can start to see the right hemidiaphragm now implying further decrease in effusion in the apical pneumothorax is minimally improved       12/11/21 0752  XR CHEST PORT Final result    Narrative:  Chest single view. Comparison single view chest December 8, 2021. Right-sided hydropneumothorax. Estimated moderate volume pleural fluid. 1.1 cm   apical pleural separation. Left lung aerated. Cardiac and mediastinal structures   unchanged. Results from [place] encounter on 12/08/21    XR CHEST PORT    Narrative  Chest single view.     Comparison single view chest December 11, 2021. Similar appearance for the right hemithorax. Predominant right basilar airspace opacity, similar volume. Unchanged hydropneumothorax. Similar degree apical pleural separation. Left lung aerated. Cardiac and mediastinal structures unchanged. XR CHEST PORT    Narrative  Chest single view. Comparison single view chest December 8, 2021. Right-sided hydropneumothorax. Estimated moderate volume pleural fluid. 1.1 cm  apical pleural separation. Left lung aerated. Cardiac and mediastinal structures  unchanged. Results from Hospital Encounter encounter on 12/08/21    XR CHEST PORT    Narrative  XR CHEST PORT    Comparison: None    Impression  FINDINGS/IMPRESSION:  Complex pleural parenchymal disease throughout the right hemithorax. Recommend  Chest CT. Results from [place] encounter on 12/08/21    CTA CHEST W OR W WO CONT    Narrative  Chest CTA    TECHNIQUE: Multiple continuous axial images were obtained from the thoracic  inlet to the upper abdomen after the uneventful administration of intravenous  contrast. Reformatted images as well as maximum intensity projection images were  obtained in the sagittal and coronal planes. Comment on dose reduction: All CT scans at this facility are performed using  dose reduction optimization technique as appropriate to perform the exam  including the following; automated exposure control, adjustments of the mA  and/or kV according to patient size, or use of iterative reconstructed  technique. Comparison examination: Chest radiograph dated same day    Findings:  LYMPHADENOPATHY: There is no axillary, mediastinal, or hilar lymphadenopathy by  CT size criteria. CARDIOVASCULAR: Heart normal in size. Thoracic aorta normal in caliber. LUNGS AND PLEURA: Large multiloculated right pleural effusion with subsequent  near complete atelectasis of the right lung.  Tree-in-bud centrilobular nodules  present throughout the left lower lobe with larger nodular airspace opacity  apical posterior segment left upper lobe. Findings consistent with  bronchiolitis/bronchopneumonia. The larger airspace nodule in the apical  posterior segment left upper lobe require surveillance. INCLUDED ABDOMEN: The visualized upper abdomen images normally. CHEST WALL: Degenerative changes are present throughout the thoracic spine. No  suspicious lytic or blastic lesion is identified. Impression  Large multiloculated right pleural effusion with subsequent near complete  atelectasis of the right lung. Therapeutic and diagnostic thoracentesis  recommended. Tree-in-bud centrilobular nodules present throughout the left lower lobe with  larger nodular airspace opacity apical posterior segment left upper lobe. Findings consistent with bronchiolitis/bronchopneumonia. The larger airspace  nodule in the apical posterior segment left upper lobe require surveillance. · 12/9 discussion 2-week history of pleuritic pain cough chills sweats fever and a drinker who will drink to the point of falling asleep most likely has aspiration pneumonia with empyema. IR has been consulted for joellen peters. He very likely will have multiple loculations preventing drainage and may need VATS  · 12/11 feels fine respirations nonlabored has just an occasional air bubble when he coughs chest x-ray with small apical pneumothorax significant fluid remains although it is certainly improved culture consistent with oral portillo to remain on Zosyn WBC count improved we will discontinue vancomycin  · 12/12 doing well at this point no air leak seen on chest tube and chest x-ray is mildly improved.   Continue current treatment    Malaika Almonte MD

## 2021-12-13 ENCOUNTER — APPOINTMENT (OUTPATIENT)
Dept: GENERAL RADIOLOGY | Age: 51
DRG: 853 | End: 2021-12-13
Attending: INTERNAL MEDICINE
Payer: COMMERCIAL

## 2021-12-13 LAB
ANION GAP SERPL CALC-SCNC: 5 MMOL/L (ref 5–15)
BASOPHILS # BLD: 0.1 K/UL (ref 0–0.1)
BASOPHILS NFR BLD: 0 % (ref 0–1)
BUN SERPL-MCNC: 5 MG/DL (ref 6–20)
BUN/CREAT SERPL: 10 (ref 12–20)
CA-I BLD-MCNC: 7.7 MG/DL (ref 8.5–10.1)
CHLORIDE SERPL-SCNC: 102 MMOL/L (ref 97–108)
CO2 SERPL-SCNC: 29 MMOL/L (ref 21–32)
CREAT SERPL-MCNC: 0.51 MG/DL (ref 0.7–1.3)
DIFFERENTIAL METHOD BLD: ABNORMAL
EOSINOPHIL # BLD: 0.1 K/UL (ref 0–0.4)
EOSINOPHIL NFR BLD: 1 % (ref 0–7)
ERYTHROCYTE [DISTWIDTH] IN BLOOD BY AUTOMATED COUNT: 12.7 % (ref 11.5–14.5)
GLUCOSE SERPL-MCNC: 145 MG/DL (ref 65–100)
HCT VFR BLD AUTO: 36.2 % (ref 36.6–50.3)
HGB BLD-MCNC: 12.5 G/DL (ref 12.1–17)
IMM GRANULOCYTES # BLD AUTO: 0.4 K/UL (ref 0–0.04)
IMM GRANULOCYTES NFR BLD AUTO: 2 % (ref 0–0.5)
LYMPHOCYTES # BLD: 1.6 K/UL (ref 0.8–3.5)
LYMPHOCYTES NFR BLD: 9 % (ref 12–49)
MCH RBC QN AUTO: 28.3 PG (ref 26–34)
MCHC RBC AUTO-ENTMCNC: 34.5 G/DL (ref 30–36.5)
MCV RBC AUTO: 81.9 FL (ref 80–99)
MONOCYTES # BLD: 1.2 K/UL (ref 0–1)
MONOCYTES NFR BLD: 7 % (ref 5–13)
NEUTS SEG # BLD: 13.7 K/UL (ref 1.8–8)
NEUTS SEG NFR BLD: 81 % (ref 32–75)
NRBC # BLD: 0 K/UL (ref 0–0.01)
NRBC BLD-RTO: 0 PER 100 WBC
PLATELET # BLD AUTO: 491 K/UL (ref 150–400)
PMV BLD AUTO: 10.3 FL (ref 8.9–12.9)
POTASSIUM SERPL-SCNC: 3.1 MMOL/L (ref 3.5–5.1)
RBC # BLD AUTO: 4.42 M/UL (ref 4.1–5.7)
SODIUM SERPL-SCNC: 136 MMOL/L (ref 136–145)
WBC # BLD AUTO: 17.1 K/UL (ref 4.1–11.1)

## 2021-12-13 PROCEDURE — 94640 AIRWAY INHALATION TREATMENT: CPT

## 2021-12-13 PROCEDURE — 74011250637 HC RX REV CODE- 250/637: Performed by: INTERNAL MEDICINE

## 2021-12-13 PROCEDURE — 74011000258 HC RX REV CODE- 258: Performed by: INTERNAL MEDICINE

## 2021-12-13 PROCEDURE — 36415 COLL VENOUS BLD VENIPUNCTURE: CPT

## 2021-12-13 PROCEDURE — 94760 N-INVAS EAR/PLS OXIMETRY 1: CPT

## 2021-12-13 PROCEDURE — 85025 COMPLETE CBC W/AUTO DIFF WBC: CPT

## 2021-12-13 PROCEDURE — 65270000029 HC RM PRIVATE

## 2021-12-13 PROCEDURE — 80048 BASIC METABOLIC PNL TOTAL CA: CPT

## 2021-12-13 PROCEDURE — 99231 SBSQ HOSP IP/OBS SF/LOW 25: CPT | Performed by: THORACIC SURGERY (CARDIOTHORACIC VASCULAR SURGERY)

## 2021-12-13 PROCEDURE — 71045 X-RAY EXAM CHEST 1 VIEW: CPT

## 2021-12-13 PROCEDURE — 74011250636 HC RX REV CODE- 250/636: Performed by: INTERNAL MEDICINE

## 2021-12-13 RX ORDER — POTASSIUM CHLORIDE 750 MG/1
40 TABLET, FILM COATED, EXTENDED RELEASE ORAL
Status: COMPLETED | OUTPATIENT
Start: 2021-12-13 | End: 2021-12-13

## 2021-12-13 RX ADMIN — THIAMINE HCL TAB 100 MG 100 MG: 100 TAB at 09:04

## 2021-12-13 RX ADMIN — PIPERACILLIN SODIUM AND TAZOBACTAM SODIUM 3.38 G: 3; .375 INJECTION, POWDER, LYOPHILIZED, FOR SOLUTION INTRAVENOUS at 01:11

## 2021-12-13 RX ADMIN — Medication 10 ML: at 05:01

## 2021-12-13 RX ADMIN — PIPERACILLIN SODIUM AND TAZOBACTAM SODIUM 3.38 G: 3; .375 INJECTION, POWDER, LYOPHILIZED, FOR SOLUTION INTRAVENOUS at 09:05

## 2021-12-13 RX ADMIN — ALBUTEROL SULFATE 2 PUFF: 108 INHALANT RESPIRATORY (INHALATION) at 20:22

## 2021-12-13 RX ADMIN — POTASSIUM CHLORIDE 40 MEQ: 750 TABLET, FILM COATED, EXTENDED RELEASE ORAL at 17:18

## 2021-12-13 RX ADMIN — BUDESONIDE AND FORMOTEROL FUMARATE DIHYDRATE 2 PUFF: 160; 4.5 AEROSOL RESPIRATORY (INHALATION) at 20:22

## 2021-12-13 RX ADMIN — ALBUTEROL SULFATE 2 PUFF: 108 INHALANT RESPIRATORY (INHALATION) at 08:37

## 2021-12-13 RX ADMIN — ENOXAPARIN SODIUM 40 MG: 100 INJECTION SUBCUTANEOUS at 09:05

## 2021-12-13 RX ADMIN — DEXTROMETHORPHAN 30 MG: 30 SUSPENSION, EXTENDED RELEASE ORAL at 09:27

## 2021-12-13 RX ADMIN — BUDESONIDE AND FORMOTEROL FUMARATE DIHYDRATE 2 PUFF: 160; 4.5 AEROSOL RESPIRATORY (INHALATION) at 08:37

## 2021-12-13 RX ADMIN — SODIUM CHLORIDE 100 ML/HR: 9 INJECTION, SOLUTION INTRAVENOUS at 17:21

## 2021-12-13 RX ADMIN — PIPERACILLIN SODIUM AND TAZOBACTAM SODIUM 3.38 G: 3; .375 INJECTION, POWDER, LYOPHILIZED, FOR SOLUTION INTRAVENOUS at 18:13

## 2021-12-13 RX ADMIN — AMLODIPINE BESYLATE 5 MG: 5 TABLET ORAL at 09:04

## 2021-12-13 RX ADMIN — ALBUTEROL SULFATE 2 PUFF: 108 INHALANT RESPIRATORY (INHALATION) at 13:29

## 2021-12-13 NOTE — PROGRESS NOTES
Hospitalist Progress Note               Daily Progress Note: 12/13/2021      Subjective: The patient is seen for follow up. He is a 18-year-old male with history of hypertension and asthma who was transferred from [facility] after he was presented there with shortness of breath and productive cough x2 weeks as well as right-sided pleuritic chest pain. Labs showed a white count of 34,000 and mild lactic acidosis. CT of the chest showed a large multiloculated right pleural effusion as well as a left apical nodule and tree-in-bud centrilobular nodules throughout the left lower lobe. Patient was hypoxic and requiring 4 L nasal cannula. Patient is a chronic smoker     He was started on Zosyn and vancomycin     -------  Today, patient seen at bedside, resting in no distress. There was no drain since yesterday. He notes that he got up yesterday and ambulated for 5 minutes. He denies shortness of breath or chest pain. He states that not much is different from yesterday. Decreased aeration on right lower lobe on exam. CXR showed unchanged hydropneumothorax. May need decortication for his parapneumonic effusion. Blood culture no growth for 4 and 2 day. 3+ gram variable rods, 2+ WBC, >100 RBC, heavy probably microaerophilic streptococcus in drained pleural fluid analysis.         Problem List:  Problem List as of 12/13/2021 Never Reviewed          Codes Class Noted - Resolved    Pleural effusion ICD-10-CM: J90  ICD-9-CM: 511.9  12/8/2021 - Present              Medications reviewed  Current Facility-Administered Medications   Medication Dose Route Frequency    albuterol (PROVENTIL HFA, VENTOLIN HFA, PROAIR HFA) inhaler 2 Puff  2 Puff Inhalation Q6HWA RT    budesonide-formoteroL (SYMBICORT) 160-4.5 mcg/actuation HFA inhaler 2 Puff  2 Puff Inhalation BID RT    0.9% sodium chloride infusion  100 mL/hr IntraVENous CONTINUOUS    amLODIPine (NORVASC) tablet 5 mg  5 mg Oral DAILY    sodium chloride (NS) flush 5-40 mL 5-40 mL IntraVENous Q8H    sodium chloride (NS) flush 5-40 mL  5-40 mL IntraVENous PRN    acetaminophen (TYLENOL) tablet 650 mg  650 mg Oral Q6H PRN    Or    acetaminophen (TYLENOL) suppository 650 mg  650 mg Rectal Q6H PRN    polyethylene glycol (MIRALAX) packet 17 g  17 g Oral DAILY PRN    ondansetron (ZOFRAN ODT) tablet 4 mg  4 mg Oral Q8H PRN    Or    ondansetron (ZOFRAN) injection 4 mg  4 mg IntraVENous Q6H PRN    enoxaparin (LOVENOX) injection 40 mg  40 mg SubCUTAneous DAILY    piperacillin-tazobactam (ZOSYN) 3.375 g in 0.9% sodium chloride (MBP/ADV) 100 mL MBP  3.375 g IntraVENous Q8H    influenza vaccine  (6 mos+)(PF) (FLUARIX/FLULAVAL/FLUZONE QUAD) injection 0.5 mL  1 Each IntraMUSCular PRIOR TO DISCHARGE    dextromethorphan (DELSYM) 30 mg/5 mL syrup 30 mg  30 mg Oral Q12H    thiamine mononitrate (B-1) tablet 100 mg  100 mg Oral DAILY    0.9% sodium chloride infusion 10 mL  10 mL Irrigation CONTINUOUS    oxyCODONE IR (ROXICODONE) tablet 5 mg  5 mg Oral Q6H PRN       Review of Systems:   A comprehensive review of systems was negative except for that written in the HPI. Objective:   Physical Exam:     Visit Vitals  BP (!) 108/59   Pulse 93   Temp 98.4 °F (36.9 °C)   Resp 18   Ht 5' 11\" (1.803 m)   Wt 73.5 kg (162 lb)   SpO2 93%   BMI 22.59 kg/m²    O2 Flow Rate (L/min): 1 l/min O2 Device: None (Room air)    Temp (24hrs), Av.4 °F (36.9 °C), Min:98.2 °F (36.8 °C), Max:98.5 °F (36.9 °C)    701 - 1900  In: -   Out: 500 [Urine:500]   1901 -  0700  In: -   Out: 0184 [Urine:1425; Drains:100]    General:   Awake and alert   Lungs:    Decreased breath sounds right lung field   Chest wall:  No tenderness or deformity. Heart:  Regular rate and rhythm, S1, S2 normal, no murmur, click, rub or gallop. Abdomen:   Soft, non-tender. Bowel sounds normal. No masses,  No organomegaly. Extremities: Extremities normal, atraumatic, no cyanosis or edema.    Pulses: 2+ and symmetric all extremities. Skin: Skin color, texture, turgor normal. No rashes or lesions   Neurologic: CNII-XII intact. No gross focal deficits         Data Review:       Recent Days:  Recent Labs     12/11/21  0330   WBC 20.3*   HGB 11.9*   HCT 34.3*   *     Recent Labs     12/11/21  0330   *   K 3.6   CL 96*   CO2 27   GLU 98   BUN 10   CREA 0.44*   CA 8.1*   PHOS 3.4   ALB 1.2*     No results for input(s): PH, PCO2, PO2, HCO3, FIO2 in the last 72 hours. 24 Hour Results:  Recent Results (from the past 24 hour(s))   GLUCOSE, POC    Collection Time: 12/12/21 11:10 AM   Result Value Ref Range    Glucose (POC) 95 65 - 117 mg/dL    Performed by Sushil Neal        XR CHEST PORT   Final Result      XR CHEST PORT   Final Result      IR THORACENTESIS/INSERT CHEST TUBE   Final Result   Ultrasound of the right upper back was performed with images stored in PACS,   demonstrating heterogeneous collection most consistent with emphysema. Successful US-guided 8.5 percutaneous drainage catheter placement into right   empyema. Plan:   Continue flushing the catheter using 10 ml sterile normal saline and record the   output at least once a day. Consider catheter removal if there is less than 20 mL output over 24 hours.                   XR CHEST PORT    (Results Pending)        Assessment:  Large right marked multiloculated pleural effusion,  due parapneumonic/empyema    Bilateral community-acquired pneumonia    Sepsis due to above with fever, leukocytosis, elevated procalcitonin and tachycardia    Acute respiratory failure with hypoxia    Tobacco abuse    Alcohol abuse      Plan:  Continue IV Zosyn    Obtain repeat electrolytes  Follow-up repeat chest x-ray  Await removal of chest tube  May switch to Augmentin on discharge  Case discussed with Dr. Hathaway Fitting discussed with: Patient/Family and Dr. Marcellus Aranda    Disposition: Continued inpatient care    Total time spent with patient: 30 minutes.     Rigoberto Krueger MD

## 2021-12-13 NOTE — PROGRESS NOTES
CM reviewed chart and spoke with primary physician. Patient is awaiting removal of chest tube. He will likely discharge home/self on PO antibiotics, but CM will continue to follow for any discharge needs.

## 2021-12-13 NOTE — PROGRESS NOTES
Consult  Pulmonary, Critical Care    Name: Deric Xavier MRN: [id number]   : 1970 Hospital: [facility]   Date: 2021  Admission date: 2021 Hospital Day: 6       Subjective/Interval History:   Seen on the medical floor awake alert. History is 2 or so weeks of breathing difficulty. Started with severe chest pain pleuritic with chills and sweats he did not take his temperature but felt hot at times. He has had continued worsening presented to the emergency room has a large right pleural effusion. CT scan suggests multiloculated effusion. Leukocytosis up to 34,000 all consistent with empyema. He is a drinker of 1-2 sixpacks a day. Does drink to the point of falling asleep although he states he is never passed out. 12/10 percutaneous drainage catheter placed by IR yesterday with 1600 cc drainage overnight and so far today 700  / chest catheter continues to drain he states he feels a little better. Chest x-ray today has small apical pneumothorax with significant amount of remaining pleural fluid   awake alert states he feels better   no specific complaints respirations    Hospital Problems  Never Reviewed          Codes Class Noted POA    Pleural effusion ICD-10-CM: J90  ICD-9-CM: 511.9  2021 Unknown              IMPRESSION:   1. Right empyema culture  microaerophilic Streptococcus remains on Zosyn  2. Aspiration pneumonia  3. Nodular density left lung probably aspiration pneumonia have to follow to make sure it clears  4. Hyponatremia improved  5. Alcohol abuse  6. COPD exacerbation  Body mass index is 22.59 kg/m². 7.       RECOMMENDATIONS/PLAN:   1. Only 100 cc chest tube drainage recorded overnight. Will ask nursing to flush the catheter every 6 hour  2. No wheezing  bronchodilators now inhalers  3. Continue IV Zosyn WBC count continues to improve  4. No evidence of developing DTs  5. Continue thiamine  6. Smoking cessation counseling done  7. [x] High complexity decision making was performed  [x] See my orders for details      Subjective/Initial History:     I was asked by Robert Riddle MD to see Ernestine Mcmullen  a 46 y.o.  male in consultation for a chief complaint of multiloculated right pleural effusion likely empyema      No Known Allergies     MAR reviewed and pertinent medications noted or modified as needed     Current Facility-Administered Medications   Medication    albuterol (PROVENTIL HFA, VENTOLIN HFA, PROAIR HFA) inhaler 2 Puff    budesonide-formoteroL (SYMBICORT) 160-4.5 mcg/actuation HFA inhaler 2 Puff    0.9% sodium chloride infusion    amLODIPine (NORVASC) tablet 5 mg    sodium chloride (NS) flush 5-40 mL    sodium chloride (NS) flush 5-40 mL    acetaminophen (TYLENOL) tablet 650 mg    Or    acetaminophen (TYLENOL) suppository 650 mg    polyethylene glycol (MIRALAX) packet 17 g    ondansetron (ZOFRAN ODT) tablet 4 mg    Or    ondansetron (ZOFRAN) injection 4 mg    enoxaparin (LOVENOX) injection 40 mg    piperacillin-tazobactam (ZOSYN) 3.375 g in 0.9% sodium chloride (MBP/ADV) 100 mL MBP    influenza vaccine 2021-22 (6 mos+)(PF) (FLUARIX/FLULAVAL/FLUZONE QUAD) injection 0.5 mL    dextromethorphan (DELSYM) 30 mg/5 mL syrup 30 mg    thiamine mononitrate (B-1) tablet 100 mg    0.9% sodium chloride infusion 10 mL    oxyCODONE IR (ROXICODONE) tablet 5 mg      Patient PCP: None  PMH:  has a past medical history of Asthma and Ill-defined condition. PSH:   has a past surgical history that includes ir thoracentesis/insert chest tube (12/9/2021). FHX: family history is not on file. SHX:  reports that he has quit smoking. He has never used smokeless tobacco. He reports current alcohol use. He reports previous drug use. To me he admits smoking 1 to 2 packs/day ever since he was 8 or 10.   He drinks 1-2 sixpacks a day beer     Systemic review:  General states his weight stable has been having chills and fever for the last 2 weeks or more  Eyes no double vision or momentary blindness  ENT no drainage or facial pain  Musculoskeletal no swollen tender joints  Endocrinologic no polyuria polydipsia  Neurologic no seizures or syncope  Gastrointestinal no nausea vomiting acid indigestion. Genitourinary no pain or discomfort on urination  Cardiovascular no history of heart disease or ankle edema he has had the pleuritic chest pain no diaphoresis  Respiratory as mentioned cough some yellow sputum fever chills shortness of breath and pleuritic pain on the right    Objective:     Vital Signs: Telemetry:    normal sinus rhythm Intake/Output:   Visit Vitals  BP (!) 108/59   Pulse 93   Temp 98.4 °F (36.9 °C)   Resp 18   Ht 5' 11\" (1.803 m)   Wt 73.5 kg (162 lb)   SpO2 93%   BMI 22.59 kg/m²       Temp (24hrs), Av.4 °F (36.9 °C), Min:98.2 °F (36.8 °C), Max:98.5 °F (36.9 °C)        O2 Device: None (Room air) O2 Flow Rate (L/min): 1 l/min       Wt Readings from Last 4 Encounters:   21 73.5 kg (162 lb)   21 73.5 kg (162 lb)          Intake/Output Summary (Last 24 hours) at 2021 1248  Last data filed at 2021 2236  Gross per 24 hour   Intake    Output 500 ml   Net -500 ml       Last shift:      701 - 1900  In: -   Out: 500 [Urine:500]  Last 3 shifts: 1901 -  07  In: -   Out: 1526 [Urine:1425; Drains:100]       Physical Exam:   General:  male; lying in bed no distress no accessory muscle usage  HEENT: NCAT, poor dentition,  Eyes: anicteric; conjunctiva clear extraocular movements intact  Neck: no nodes, no JVD, no accessory MM use. Chest: no deformity,   Cardiac: Regular rate and rhythm  Lungs: Markedly improved breath sounds on the right present anteriorly and upper lung posteriorly and developing some in the right base now, left side fairly clear  Abd: Thin soft positive bowel sounds  Ext: no edema; no joint swelling;  No clubbing  : clear urine  Neuro: Awake alert calm speech is clear moves all 4 extremities  Psych- no agitation, oriented to person;   Skin: warm, dry, no cyanosis;   Pulses: Brachial radial pulses intact  Capillary: Normal capillary refill    Labs:    Recent Labs     12/13/21  0939 12/11/21  0330   WBC 17.1* 20.3*   HGB 12.5 11.9*   * 436*     Recent Labs     12/13/21  0939 12/11/21  0330    131*   K 3.1* 3.6    96*   CO2 29 27   * 98   BUN 5* 10   CREA 0.51* 0.44*   CA 7.7* 8.1*   PHOS  --  3.4   ALB  --  1.2*   . Pleural fluid WBC count 47,840 with 56% polys 32% lymphs pleural fluid protein 0.8 glucose 4  Pleural culture with  microaerophilic Streptococcus    Lab Results   Component Value Date/Time    Culture result: Heavy Streptococcus anginosus 12/09/2021 11:30 AM    Culture result: No growth 3 days 12/09/2021 07:42 AM    Culture result: No growth 5 days 12/08/2021 08:45 PM     Imaging:    CXR Results  (Last 48 hours)               12/12/21 0854  XR CHEST PORT Final result    Narrative:  Chest single view. Comparison single view chest December 11, 2021. Similar appearance for the right hemithorax. Predominant right basilar airspace opacity, similar volume. Unchanged hydropneumothorax. Similar degree apical pleural separation. Left lung aerated. Cardiac and mediastinal structures unchanged. To me the x-ray is improved you can start to see the right hemidiaphragm now implying further decrease in effusion in the apical pneumothorax is minimally improved       12/11/21 0752  XR CHEST PORT Final result    Narrative:  Chest single view. Comparison single view chest December 8, 2021. Right-sided hydropneumothorax. Estimated moderate volume pleural fluid. 1.1 cm   apical pleural separation. Left lung aerated. Cardiac and mediastinal structures   unchanged.            Results from [place] encounter on 12/08/21    XR CHEST PORT    Narrative  Chest single view    Comparison single view chest 12/12/2021. Similar appearance right hemithorax. Predominant right basilar opacity. Similar  known empyema. Apical air component with 2.1 cm pleural separation. Left lung aerated. Cardiac and mediastinal structures unchanged. XR CHEST PORT    Narrative  Chest single view. Comparison single view chest December 11, 2021. Similar appearance for the right hemithorax. Predominant right basilar airspace opacity, similar volume. Unchanged hydropneumothorax. Similar degree apical pleural separation. Left lung aerated. Cardiac and mediastinal structures unchanged. XR CHEST PORT    Narrative  Chest single view. Comparison single view chest December 8, 2021. Right-sided hydropneumothorax. Estimated moderate volume pleural fluid. 1.1 cm  apical pleural separation. Left lung aerated. Cardiac and mediastinal structures  unchanged. Results from [place] encounter on 12/08/21    CTA CHEST W OR W WO CONT    Narrative  Chest CTA    TECHNIQUE: Multiple continuous axial images were obtained from the thoracic  inlet to the upper abdomen after the uneventful administration of intravenous  contrast. Reformatted images as well as maximum intensity projection images were  obtained in the sagittal and coronal planes. Comment on dose reduction: All CT scans at this facility are performed using  dose reduction optimization technique as appropriate to perform the exam  including the following; automated exposure control, adjustments of the mA  and/or kV according to patient size, or use of iterative reconstructed  technique. Comparison examination: Chest radiograph dated same day    Findings:  LYMPHADENOPATHY: There is no axillary, mediastinal, or hilar lymphadenopathy by  CT size criteria. CARDIOVASCULAR: Heart normal in size. Thoracic aorta normal in caliber.     LUNGS AND PLEURA: Large multiloculated right pleural effusion with subsequent  near complete atelectasis of the right lung. Tree-in-bud centrilobular nodules  present throughout the left lower lobe with larger nodular airspace opacity  apical posterior segment left upper lobe. Findings consistent with  bronchiolitis/bronchopneumonia. The larger airspace nodule in the apical  posterior segment left upper lobe require surveillance. INCLUDED ABDOMEN: The visualized upper abdomen images normally. CHEST WALL: Degenerative changes are present throughout the thoracic spine. No  suspicious lytic or blastic lesion is identified. Impression  Large multiloculated right pleural effusion with subsequent near complete  atelectasis of the right lung. Therapeutic and diagnostic thoracentesis  recommended. Tree-in-bud centrilobular nodules present throughout the left lower lobe with  larger nodular airspace opacity apical posterior segment left upper lobe. Findings consistent with bronchiolitis/bronchopneumonia. The larger airspace  nodule in the apical posterior segment left upper lobe require surveillance. · 12/9 discussion 2-week history of pleuritic pain cough chills sweats fever and a drinker who will drink to the point of falling asleep most likely has aspiration pneumonia with empyema. IR has been consulted for joellen peters. He very likely will have multiple loculations preventing drainage and may need VATS  · 12/11 feels fine respirations nonlabored has just an occasional air bubble when he coughs chest x-ray with small apical pneumothorax significant fluid remains although it is certainly improved culture consistent with oral portillo to remain on Zosyn WBC count improved we will discontinue vancomycin  · 12/12 doing well at this point no air leak seen on chest tube and chest x-ray is mildly improved. Continue current treatment  · 12/13 drainage has decreased from the chest catheter. Will flush with saline and follow drainage.   Chest x-ray is improved diaphragm can be seen on the right with infiltrate in that area likely representing pneumonia cannot tell how much fluid remains    Jeremiah Phan MD

## 2021-12-13 NOTE — PROGRESS NOTES
Today he is doing well, no complaints, his temp is down and his WBC is also coming down as is drainage from the chest tube and consistency has changed from claudia Pus to serosangous drainage. CXR looks as expected with multiloculated effusions and pleural peop    His problem is a surgical one, and he can not recover without decortication. I would ordinarly  be doing his surgery this Friday Dec 17th  at the earliest, to allow him to have as much improvement that he can have prior to surgery as he is improving. I am however out of town this week and can not do his surgery this Friday, but I have placed him on the surgery schedule for Tuesday Dec 21. As I wrote in my note last week, he would not be ready for surgery until Dec 17 and I am not avaliable until Dec 21, that he could be transferred to another hospital or he just stays in this hospital over next weekend ie a extra couple of days and then I can operate on him,  What ever the medical service wants to do. I keeps seeing things in the chart that his tube is coming out and he is going home on PO antibiotics!!!!! This can't happen he either stays in the hosptial on IV antibiotics with the chest tube in until I operate on him on Dec 21 or the medical services transfers him to another institution to be operated on Dec 17 or later. Taking out the chest tube and sending him home on PO antibiotics is not an option! I have discussed this with Dr. Kamaljit Escobar and Dr John Alonso today and the patient for 15 min. Hugo Mirza

## 2021-12-13 NOTE — PROGRESS NOTES
Problem: Falls - Risk of  Goal: *Absence of Falls  Description: Document Vicente Xiao Fall Risk and appropriate interventions in the flowsheet.   Outcome: Progressing Towards Goal  Note: Fall Risk Interventions:  Mobility Interventions: Bed/chair exit alarm, OT consult for ADLs, PT Consult for mobility concerns, Utilize gait belt for transfers/ambulation         Medication Interventions: Bed/chair exit alarm, Patient to call before getting OOB, Utilize gait belt for transfers/ambulation    Elimination Interventions: Bed/chair exit alarm, Call light in reach              Problem: Patient Education: Go to Patient Education Activity  Goal: Patient/Family Education  Outcome: Progressing Towards Goal

## 2021-12-13 NOTE — PROGRESS NOTES
12/13/2021            RE: Ricardo Galvin         [address] [address]              To Whom It May Concern,      Due to medical reasons, Ricardo Galvin was admitted at [facility] on 12/08/21 and will be in the hospital for an anticipated additional 10 days. For additional questions, feel free to call Dr Basil Kumar at [phone].           Sincerely,          Brooke Grant MD

## 2021-12-13 NOTE — PROGRESS NOTES
Hospitalist Progress Note               Daily Progress Note: 12/13/2021      Subjective: The patient is seen for follow up. He is a 61-year-old male with history of hypertension and asthma who was transferred from [facility] after he was presented there with shortness of breath and productive cough x2 weeks as well as right-sided pleuritic chest pain. Labs showed a white count of 34,000 and mild lactic acidosis. CT of the chest showed a large multiloculated right pleural effusion as well as a left apical nodule and tree-in-bud centrilobular nodules throughout the left lower lobe. Patient was hypoxic and requiring 4 L nasal cannula. Patient is a chronic smoker    He was started on Zosyn and vancomycin    -------  Today, patient seen at bedside, resting in no distress. There was no drain since yesterday. He notes that he got up yesterday and ambulated for 5 minutes. He denies shortness of breath or chest pain. He states that not much is different from yesterday. Decreased aeration on right lower lobe on exam. CXR showed unchanged hydropneumothorax. May need decortication for his parapneumonic effusion. Blood culture no growth for 4 and 2 day. 3+ gram variable rods, 2+ WBC, >100 RBC, heavy probably microaerophilic streptococcus in drained pleural fluid analysis.     Problem List:  Problem List as of 12/13/2021 Never Reviewed          Codes Class Noted - Resolved    Pleural effusion ICD-10-CM: J90  ICD-9-CM: 511.9  12/8/2021 - Present              Medications reviewed  Current Facility-Administered Medications   Medication Dose Route Frequency    albuterol (PROVENTIL HFA, VENTOLIN HFA, PROAIR HFA) inhaler 2 Puff  2 Puff Inhalation Q6HWA RT    budesonide-formoteroL (SYMBICORT) 160-4.5 mcg/actuation HFA inhaler 2 Puff  2 Puff Inhalation BID RT    0.9% sodium chloride infusion  100 mL/hr IntraVENous CONTINUOUS    amLODIPine (NORVASC) tablet 5 mg  5 mg Oral DAILY    sodium chloride (NS) flush 5-40 mL  5-40 mL IntraVENous Q8H    sodium chloride (NS) flush 5-40 mL  5-40 mL IntraVENous PRN    acetaminophen (TYLENOL) tablet 650 mg  650 mg Oral Q6H PRN    Or    acetaminophen (TYLENOL) suppository 650 mg  650 mg Rectal Q6H PRN    polyethylene glycol (MIRALAX) packet 17 g  17 g Oral DAILY PRN    ondansetron (ZOFRAN ODT) tablet 4 mg  4 mg Oral Q8H PRN    Or    ondansetron (ZOFRAN) injection 4 mg  4 mg IntraVENous Q6H PRN    enoxaparin (LOVENOX) injection 40 mg  40 mg SubCUTAneous DAILY    piperacillin-tazobactam (ZOSYN) 3.375 g in 0.9% sodium chloride (MBP/ADV) 100 mL MBP  3.375 g IntraVENous Q8H    influenza vaccine  (6 mos+)(PF) (FLUARIX/FLULAVAL/FLUZONE QUAD) injection 0.5 mL  1 Each IntraMUSCular PRIOR TO DISCHARGE    dextromethorphan (DELSYM) 30 mg/5 mL syrup 30 mg  30 mg Oral Q12H    thiamine mononitrate (B-1) tablet 100 mg  100 mg Oral DAILY    0.9% sodium chloride infusion 10 mL  10 mL Irrigation CONTINUOUS    oxyCODONE IR (ROXICODONE) tablet 5 mg  5 mg Oral Q6H PRN       Review of Systems:   A comprehensive review of systems was negative except for that written in the HPI. Objective:   Physical Exam:     Visit Vitals  /65   Pulse 97   Temp 98.2 °F (36.8 °C)   Resp 16   Ht 5' 11\" (1.803 m)   Wt 162 lb (73.5 kg)   SpO2 96%   BMI 22.59 kg/m²    O2 Flow Rate (L/min): 1 l/min O2 Device: None (Room air)    Temp (24hrs), Av.4 °F (36.9 °C), Min:98.2 °F (36.8 °C), Max:98.6 °F (37 °C)    701 - 1900  In: -   Out: 500 [Urine:500]   1901 -  07  In: -   Out: 2917 [Urine:1425; Drains:100]    General:   Awake and alert   Lungs:    Decreased breath sounds right lung field   Chest wall:  No tenderness or deformity. Heart:  Regular rate and rhythm, S1, S2 normal, no murmur, click, rub or gallop. Abdomen:   Soft, non-tender. Bowel sounds normal. No masses,  No organomegaly. Extremities: Extremities normal, atraumatic, no cyanosis or edema.    Pulses: 2+ and symmetric all extremities. Skin: Skin color, texture, turgor normal. No rashes or lesions   Neurologic: CNII-XII intact. No gross focal deficits         Data Review:       Recent Days:  Recent Labs     12/11/21  0330   WBC 20.3*   HGB 11.9*   HCT 34.3*   *     Recent Labs     12/11/21  0330   *   K 3.6   CL 96*   CO2 27   GLU 98   BUN 10   CREA 0.44*   CA 8.1*   PHOS 3.4   ALB 1.2*     No results for input(s): PH, PCO2, PO2, HCO3, FIO2 in the last 72 hours. 24 Hour Results:  Recent Results (from the past 24 hour(s))   GLUCOSE, POC    Collection Time: 12/12/21 11:10 AM   Result Value Ref Range    Glucose (POC) 95 65 - 117 mg/dL    Performed by Hudson De Leon        XR CHEST PORT   Final Result      XR CHEST PORT   Final Result      IR THORACENTESIS/INSERT CHEST TUBE   Final Result   Ultrasound of the right upper back was performed with images stored in PACS,   demonstrating heterogeneous collection most consistent with emphysema. Successful US-guided 8.5 percutaneous drainage catheter placement into right   empyema. Plan:   Continue flushing the catheter using 10 ml sterile normal saline and record the   output at least once a day. Consider catheter removal if there is less than 20 mL output over 24 hours. XR CHEST PORT    (Results Pending)          Assessment:  Large right marked multiloculated pleural effusion,  due parapneumonic/empyema     Bilateral community-acquired pneumonia     Sepsis due to above with fever, leukocytosis, elevated procalcitonin and tachycardia     Acute respiratory failure with hypoxia     Tobacco abuse     Alcohol abuse        Plan:  Continue Zosyn  Obtain repeat electrolytes  IP Thoracic surgery consult     Care Plan discussed with: Patient/Family and Dr. Yamilex Nevarez     Disposition: Continued inpatient care     Total time spent with patient: 30 minutes.     Julieta Zhong

## 2021-12-14 LAB
ALBUMIN SERPL-MCNC: 1.3 G/DL (ref 3.5–5)
ANION GAP SERPL CALC-SCNC: 6 MMOL/L (ref 5–15)
BACTERIA SPEC CULT: NORMAL
BASOPHILS # BLD: 0.1 K/UL (ref 0–0.1)
BASOPHILS NFR BLD: 1 % (ref 0–1)
BUN SERPL-MCNC: 5 MG/DL (ref 6–20)
BUN/CREAT SERPL: 12 (ref 12–20)
CA-I BLD-MCNC: 8 MG/DL (ref 8.5–10.1)
CHLORIDE SERPL-SCNC: 106 MMOL/L (ref 97–108)
CO2 SERPL-SCNC: 27 MMOL/L (ref 21–32)
CREAT SERPL-MCNC: 0.42 MG/DL (ref 0.7–1.3)
DIFFERENTIAL METHOD BLD: ABNORMAL
EOSINOPHIL # BLD: 0.1 K/UL (ref 0–0.4)
EOSINOPHIL NFR BLD: 1 % (ref 0–7)
ERYTHROCYTE [DISTWIDTH] IN BLOOD BY AUTOMATED COUNT: 13 % (ref 11.5–14.5)
GLUCOSE SERPL-MCNC: 96 MG/DL (ref 65–100)
HCT VFR BLD AUTO: 36.9 % (ref 36.6–50.3)
HGB BLD-MCNC: 12.5 G/DL (ref 12.1–17)
IMM GRANULOCYTES # BLD AUTO: 0.4 K/UL (ref 0–0.04)
IMM GRANULOCYTES NFR BLD AUTO: 3 % (ref 0–0.5)
LYMPHOCYTES # BLD: 1.7 K/UL (ref 0.8–3.5)
LYMPHOCYTES NFR BLD: 11 % (ref 12–49)
MCH RBC QN AUTO: 28 PG (ref 26–34)
MCHC RBC AUTO-ENTMCNC: 33.9 G/DL (ref 30–36.5)
MCV RBC AUTO: 82.7 FL (ref 80–99)
MONOCYTES # BLD: 1.2 K/UL (ref 0–1)
MONOCYTES NFR BLD: 8 % (ref 5–13)
NEUTS SEG # BLD: 11.8 K/UL (ref 1.8–8)
NEUTS SEG NFR BLD: 76 % (ref 32–75)
NRBC # BLD: 0 K/UL (ref 0–0.01)
NRBC BLD-RTO: 0 PER 100 WBC
PHOSPHATE SERPL-MCNC: 2.4 MG/DL (ref 2.6–4.7)
PLATELET # BLD AUTO: 531 K/UL (ref 150–400)
PMV BLD AUTO: 10.2 FL (ref 8.9–12.9)
POTASSIUM SERPL-SCNC: 3.8 MMOL/L (ref 3.5–5.1)
RBC # BLD AUTO: 4.46 M/UL (ref 4.1–5.7)
SODIUM SERPL-SCNC: 139 MMOL/L (ref 136–145)
SPECIAL REQUESTS,SREQ: NORMAL
WBC # BLD AUTO: 15.3 K/UL (ref 4.1–11.1)

## 2021-12-14 PROCEDURE — 74011250637 HC RX REV CODE- 250/637: Performed by: INTERNAL MEDICINE

## 2021-12-14 PROCEDURE — 94760 N-INVAS EAR/PLS OXIMETRY 1: CPT

## 2021-12-14 PROCEDURE — 80069 RENAL FUNCTION PANEL: CPT

## 2021-12-14 PROCEDURE — 74011250636 HC RX REV CODE- 250/636: Performed by: INTERNAL MEDICINE

## 2021-12-14 PROCEDURE — 94640 AIRWAY INHALATION TREATMENT: CPT

## 2021-12-14 PROCEDURE — 65270000029 HC RM PRIVATE

## 2021-12-14 PROCEDURE — 36415 COLL VENOUS BLD VENIPUNCTURE: CPT

## 2021-12-14 PROCEDURE — 85025 COMPLETE CBC W/AUTO DIFF WBC: CPT

## 2021-12-14 PROCEDURE — 74011000258 HC RX REV CODE- 258: Performed by: INTERNAL MEDICINE

## 2021-12-14 RX ADMIN — SODIUM CHLORIDE 100 ML/HR: 9 INJECTION, SOLUTION INTRAVENOUS at 13:31

## 2021-12-14 RX ADMIN — ALBUTEROL SULFATE 2 PUFF: 108 INHALANT RESPIRATORY (INHALATION) at 07:20

## 2021-12-14 RX ADMIN — ALBUTEROL SULFATE 2 PUFF: 108 INHALANT RESPIRATORY (INHALATION) at 13:17

## 2021-12-14 RX ADMIN — Medication 10 ML: at 13:31

## 2021-12-14 RX ADMIN — AMLODIPINE BESYLATE 5 MG: 5 TABLET ORAL at 09:53

## 2021-12-14 RX ADMIN — OXYCODONE 5 MG: 5 TABLET ORAL at 17:54

## 2021-12-14 RX ADMIN — OXYCODONE 5 MG: 5 TABLET ORAL at 23:45

## 2021-12-14 RX ADMIN — PIPERACILLIN SODIUM AND TAZOBACTAM SODIUM 3.38 G: 3; .375 INJECTION, POWDER, LYOPHILIZED, FOR SOLUTION INTRAVENOUS at 17:44

## 2021-12-14 RX ADMIN — OXYCODONE 5 MG: 5 TABLET ORAL at 10:01

## 2021-12-14 RX ADMIN — PIPERACILLIN SODIUM AND TAZOBACTAM SODIUM 3.38 G: 3; .375 INJECTION, POWDER, LYOPHILIZED, FOR SOLUTION INTRAVENOUS at 09:52

## 2021-12-14 RX ADMIN — THIAMINE HCL TAB 100 MG 100 MG: 100 TAB at 09:53

## 2021-12-14 RX ADMIN — ENOXAPARIN SODIUM 40 MG: 100 INJECTION SUBCUTANEOUS at 09:53

## 2021-12-14 RX ADMIN — Medication 10 ML: at 21:21

## 2021-12-14 RX ADMIN — BUDESONIDE AND FORMOTEROL FUMARATE DIHYDRATE 2 PUFF: 160; 4.5 AEROSOL RESPIRATORY (INHALATION) at 21:22

## 2021-12-14 RX ADMIN — BUDESONIDE AND FORMOTEROL FUMARATE DIHYDRATE 2 PUFF: 160; 4.5 AEROSOL RESPIRATORY (INHALATION) at 07:20

## 2021-12-14 RX ADMIN — ALBUTEROL SULFATE 2 PUFF: 108 INHALANT RESPIRATORY (INHALATION) at 21:23

## 2021-12-14 RX ADMIN — PIPERACILLIN SODIUM AND TAZOBACTAM SODIUM 3.38 G: 3; .375 INJECTION, POWDER, LYOPHILIZED, FOR SOLUTION INTRAVENOUS at 02:48

## 2021-12-14 NOTE — PROGRESS NOTES
Hospitalist Progress Note               Daily Progress Note: 12/14/2021      Subjective: The patient is seen for follow up. He is a 57-year-old male with history of hypertension and asthma who was transferred from [facility] after he was presented there with shortness of breath and productive cough x2 weeks as well as right-sided pleuritic chest pain. Labs showed a white count of 34,000 and mild lactic acidosis. CT of the chest showed a large multiloculated right pleural effusion as well as a left apical nodule and tree-in-bud centrilobular nodules throughout the left lower lobe. Patient was hypoxic and requiring 4 L nasal cannula. Patient is a chronic smoker     He was started on Zosyn and vancomycin     -------  Today, patient seen at bedside, resting in no distress. Due to low chest tube drainage, catheter flushing every 6 hour requested to nursing. Temp down, WBC going down as well. Drainage changed from claudia pus to serosanguinous drainage. Cardiovascular surgery recommeneded decortication for recovery. Stay on IV antibiotics with chest tube until decortication on Dec 17 or later. Patient communicated understanding of needing surgery after Friday, probably next Tuesday. Denies other complaints at this time. He denies shortness of breath, chest pain, headache, dizziness. He notes that he has ambulated around the room yesterday when his mother and girlfriend came to visit him. He notes some lumbago that radiates bilaterally in a horizontal plane.  Will continue to monitor.       Problem List:  Problem List as of 12/14/2021 Never Reviewed          Codes Class Noted - Resolved    Pleural effusion ICD-10-CM: J90  ICD-9-CM: 511.9  12/8/2021 - Present              Medications reviewed  Current Facility-Administered Medications   Medication Dose Route Frequency    albuterol (PROVENTIL HFA, VENTOLIN HFA, PROAIR HFA) inhaler 2 Puff  2 Puff Inhalation Q6HWA RT    budesonide-formoteroL (SYMBICORT) 160-4.5 mcg/actuation HFA inhaler 2 Puff  2 Puff Inhalation BID RT    0.9% sodium chloride infusion  100 mL/hr IntraVENous CONTINUOUS    amLODIPine (NORVASC) tablet 5 mg  5 mg Oral DAILY    sodium chloride (NS) flush 5-40 mL  5-40 mL IntraVENous Q8H    sodium chloride (NS) flush 5-40 mL  5-40 mL IntraVENous PRN    acetaminophen (TYLENOL) tablet 650 mg  650 mg Oral Q6H PRN    Or    acetaminophen (TYLENOL) suppository 650 mg  650 mg Rectal Q6H PRN    polyethylene glycol (MIRALAX) packet 17 g  17 g Oral DAILY PRN    ondansetron (ZOFRAN ODT) tablet 4 mg  4 mg Oral Q8H PRN    Or    ondansetron (ZOFRAN) injection 4 mg  4 mg IntraVENous Q6H PRN    enoxaparin (LOVENOX) injection 40 mg  40 mg SubCUTAneous DAILY    piperacillin-tazobactam (ZOSYN) 3.375 g in 0.9% sodium chloride (MBP/ADV) 100 mL MBP  3.375 g IntraVENous Q8H    influenza vaccine  (6 mos+)(PF) (FLUARIX/FLULAVAL/FLUZONE QUAD) injection 0.5 mL  1 Each IntraMUSCular PRIOR TO DISCHARGE    thiamine mononitrate (B-1) tablet 100 mg  100 mg Oral DAILY    0.9% sodium chloride infusion 10 mL  10 mL Irrigation CONTINUOUS    oxyCODONE IR (ROXICODONE) tablet 5 mg  5 mg Oral Q6H PRN       Review of Systems:   A comprehensive review of systems was negative except for that written in the HPI. Objective:   Physical Exam:     Visit Vitals  /87   Pulse 86   Temp 98.6 °F (37 °C)   Resp 16   Ht 5' 11\" (1.803 m)   Wt 73.5 kg (162 lb)   SpO2 95%   BMI 22.59 kg/m²    O2 Flow Rate (L/min): 1.5 l/min O2 Device: None (Room air)    Temp (24hrs), Av °F (37.2 °C), Min:98.6 °F (37 °C), Max:99.5 °F (37.5 °C)     07 -  1900  In: 300 [P.O.:300]  Out: 8566 [Urine:1700; Drains:150]   1901 -  0700  In: 700 [P.O.:700]  Out: 1100 [Urine:1100]    General:   Awake and alert   Lungs:    Decreased breath sounds right lung field   Chest wall:  No tenderness or deformity. Heart:  Regular rate and rhythm, S1, S2 normal, no murmur, click, rub or gallop. Abdomen:   Soft, non-tender. Bowel sounds normal. No masses,  No organomegaly. Extremities: Extremities normal, atraumatic, no cyanosis or edema. Pulses: 2+ and symmetric all extremities. Skin: Skin color, texture, turgor normal. No rashes or lesions   Neurologic: CNII-XII intact. No gross focal deficits         Data Review:       Recent Days:  Recent Labs     12/14/21  0732 12/13/21  0939   WBC 15.3* 17.1*   HGB 12.5 12.5   HCT 36.9 36.2*   * 491*     Recent Labs     12/14/21  0732 12/13/21  0939    136   K 3.8 3.1*    102   CO2 27 29   GLU 96 145*   BUN 5* 5*   CREA 0.42* 0.51*   CA 8.0* 7.7*   PHOS 2.4*  --    ALB 1.3*  --      No results for input(s): PH, PCO2, PO2, HCO3, FIO2 in the last 72 hours. 24 Hour Results:  Recent Results (from the past 24 hour(s))   CBC WITH AUTOMATED DIFF    Collection Time: 12/14/21  7:32 AM   Result Value Ref Range    WBC 15.3 (H) 4.1 - 11.1 K/uL    RBC 4.46 4.10 - 5.70 M/uL    HGB 12.5 12.1 - 17.0 g/dL    HCT 36.9 36.6 - 50.3 %    MCV 82.7 80.0 - 99.0 FL    MCH 28.0 26.0 - 34.0 PG    MCHC 33.9 30.0 - 36.5 g/dL    RDW 13.0 11.5 - 14.5 %    PLATELET 903 (H) 696 - 400 K/uL    MPV 10.2 8.9 - 12.9 FL    NRBC 0.0 0.0  WBC    ABSOLUTE NRBC 0.00 0.00 - 0.01 K/uL    NEUTROPHILS 76 (H) 32 - 75 %    LYMPHOCYTES 11 (L) 12 - 49 %    MONOCYTES 8 5 - 13 %    EOSINOPHILS 1 0 - 7 %    BASOPHILS 1 0 - 1 %    IMMATURE GRANULOCYTES 3 (H) 0 - 0.5 %    ABS. NEUTROPHILS 11.8 (H) 1.8 - 8.0 K/UL    ABS. LYMPHOCYTES 1.7 0.8 - 3.5 K/UL    ABS. MONOCYTES 1.2 (H) 0.0 - 1.0 K/UL    ABS. EOSINOPHILS 0.1 0.0 - 0.4 K/UL    ABS. BASOPHILS 0.1 0.0 - 0.1 K/UL    ABS. IMM.  GRANS. 0.4 (H) 0.00 - 0.04 K/UL    DF AUTOMATED     RENAL FUNCTION PANEL    Collection Time: 12/14/21  7:32 AM   Result Value Ref Range    Sodium 139 136 - 145 mmol/L    Potassium 3.8 3.5 - 5.1 mmol/L    Chloride 106 97 - 108 mmol/L    CO2 27 21 - 32 mmol/L    Anion gap 6 5 - 15 mmol/L    Glucose 96 65 - 100 mg/dL    BUN 5 (L) 6 - 20 mg/dL    Creatinine 0.42 (L) 0.70 - 1.30 mg/dL    BUN/Creatinine ratio 12 12 - 20      GFR est AA >60 >60 ml/min/1.73m2    GFR est non-AA >60 >60 ml/min/1.73m2    Calcium 8.0 (L) 8.5 - 10.1 mg/dL    Phosphorus 2.4 (L) 2.6 - 4.7 mg/dL    Albumin 1.3 (L) 3.5 - 5.0 g/dL       XR CHEST PORT   Final Result      XR CHEST PORT   Final Result      XR CHEST PORT   Final Result      IR THORACENTESIS/INSERT CHEST TUBE   Final Result   Ultrasound of the right upper back was performed with images stored in PACS,   demonstrating heterogeneous collection most consistent with emphysema. Successful US-guided 8.5 percutaneous drainage catheter placement into right   empyema. Plan:   Continue flushing the catheter using 10 ml sterile normal saline and record the   output at least once a day. Consider catheter removal if there is less than 20 mL output over 24 hours. XR CHEST PORT    (Results Pending)   CT CHEST WO CONT    (Results Pending)        Assessment:  Large right marked multiloculated pleural effusion,  due parapneumonic/empyema    Bilateral community-acquired pneumonia    Sepsis due to above with fever, leukocytosis, elevated procalcitonin and tachycardia    Acute respiratory failure with hypoxia    Tobacco abuse    Alcohol abuse      Plan:  Continue supportive care including IV Zosyn  Follow-up thoracic surgery recommendations  Anticipate decortication next Tuesday  Case discussed with Dr. Cammy Vaughn discussed with: Patient/Family and Dr. Perez    Disposition: Continued inpatient care    Total time spent with patient: 30 minutes.     Pranay Lay MD

## 2021-12-14 NOTE — PROGRESS NOTES
Consult  Pulmonary, Critical Care    Name: Cecelia Chery MRN: [id number]   : 1970 Hospital: [address] [place] [address]   Date: 2021  Admission date: 2021 Hospital Day: 7       Subjective/Interval History:   Seen on the medical floor awake alert. History is 2 or so weeks of breathing difficulty. Started with severe chest pain pleuritic with chills and sweats he did not take his temperature but felt hot at times. He has had continued worsening presented to the emergency room has a large right pleural effusion. CT scan suggests multiloculated effusion. Leukocytosis up to 34,000 all consistent with empyema. He is a drinker of 1-2 sixpacks a day. Does drink to the point of falling asleep although he states he is never passed out. 12/10 percutaneous drainage catheter placed by IR yesterday with 1600 cc drainage overnight and so far today 700  12/11 chest catheter continues to drain he states he feels a little better. Chest x-ray today has small apical pneumothorax with significant amount of remaining pleural fluid   awake alert states he feels better   no specific complaints respirations    Hospital Problems  Never Reviewed          Codes Class Noted POA    Pleural effusion ICD-10-CM: J90  ICD-9-CM: 511.9  2021 Unknown              IMPRESSION:   1. Right empyema culture  microaerophilic Streptococcus remains on Zosyn  2. Aspiration pneumonia  3. Nodular density left lung probably aspiration pneumonia have to follow to make sure it clears  4. Hyponatremia improved  5. Alcohol abuse  6. COPD exacerbation  Body mass index is 22.59 kg/m². RECOMMENDATIONS/PLAN:   1. Only 100 cc chest tube drainage recorded overnight. Will ask nursing to flush the catheter every 6 hour  2. No wheezing  bronchodilators now inhalers  3. Continue IV Zosyn WBC count continues to improve  4. No evidence of developing DTs  5. Continue thiamine  6.  Smoking cessation counseling done          [x] High complexity decision making was performed  [x] See my orders for details      Subjective/Initial History:     I was asked by Marleny Garcia MD to see Les Gregory  a 46 y.o.  male in consultation for a chief complaint of multiloculated right pleural effusion likely empyema      No Known Allergies     MAR reviewed and pertinent medications noted or modified as needed     Current Facility-Administered Medications   Medication    albuterol (PROVENTIL HFA, VENTOLIN HFA, PROAIR HFA) inhaler 2 Puff    budesonide-formoteroL (SYMBICORT) 160-4.5 mcg/actuation HFA inhaler 2 Puff    0.9% sodium chloride infusion    amLODIPine (NORVASC) tablet 5 mg    sodium chloride (NS) flush 5-40 mL    sodium chloride (NS) flush 5-40 mL    acetaminophen (TYLENOL) tablet 650 mg    Or    acetaminophen (TYLENOL) suppository 650 mg    polyethylene glycol (MIRALAX) packet 17 g    ondansetron (ZOFRAN ODT) tablet 4 mg    Or    ondansetron (ZOFRAN) injection 4 mg    enoxaparin (LOVENOX) injection 40 mg    piperacillin-tazobactam (ZOSYN) 3.375 g in 0.9% sodium chloride (MBP/ADV) 100 mL MBP    influenza vaccine 2021-22 (6 mos+)(PF) (FLUARIX/FLULAVAL/FLUZONE QUAD) injection 0.5 mL    thiamine mononitrate (B-1) tablet 100 mg    0.9% sodium chloride infusion 10 mL    oxyCODONE IR (ROXICODONE) tablet 5 mg      Patient PCP: None  PMH:  has a past medical history of Asthma and Ill-defined condition. PSH:   has a past surgical history that includes ir thoracentesis/insert chest tube (12/9/2021). FHX: family history is not on file. SHX:  reports that he has quit smoking. He has never used smokeless tobacco. He reports current alcohol use. He reports previous drug use. To me he admits smoking 1 to 2 packs/day ever since he was 8 or 10.   He drinks 1-2 sixpacks a day beer     Systemic review:  General states his weight stable has been having chills and fever for the last 2 weeks or more  Eyes no double vision or momentary blindness  ENT no drainage or facial pain  Musculoskeletal no swollen tender joints  Endocrinologic no polyuria polydipsia  Neurologic no seizures or syncope  Gastrointestinal no nausea vomiting acid indigestion. Genitourinary no pain or discomfort on urination  Cardiovascular no history of heart disease or ankle edema he has had the pleuritic chest pain no diaphoresis  Respiratory as mentioned cough some yellow sputum fever chills shortness of breath and pleuritic pain on the right    Objective:     Vital Signs: Telemetry:    normal sinus rhythm Intake/Output:   Visit Vitals  /87   Pulse 86   Temp 98.6 °F (37 °C)   Resp 16   Ht 5' 11\" (1.803 m)   Wt 73.5 kg (162 lb)   SpO2 95%   BMI 22.59 kg/m²       Temp (24hrs), Av °F (37.2 °C), Min:98.6 °F (37 °C), Max:99.5 °F (37.5 °C)        O2 Device: None (Room air) O2 Flow Rate (L/min): 1.5 l/min       Wt Readings from Last 4 Encounters:   21 73.5 kg (162 lb)   21 73.5 kg (162 lb)          Intake/Output Summary (Last 24 hours) at 2021 1053  Last data filed at 2021 1029  Gross per 24 hour   Intake 1000 ml   Output 2450 ml   Net -1450 ml       Last shift:       07 -  1900  In: 300 [P.O.:300]  Out: 1850 [Urine:1700; Drains:150]  Last 3 shifts: 1901 -  0700  In: 700 [P.O.:700]  Out: 1100 [Urine:1100]       Physical Exam:   General:  male; lying in bed no distress no accessory muscle usage  HEENT: NCAT, poor dentition,  Eyes: anicteric; conjunctiva clear extraocular movements intact  Neck: no nodes, no JVD, no accessory MM use. Chest: no deformity,   Cardiac: Regular rate and rhythm  Lungs: Markedly improved breath sounds on the right present anteriorly and upper lung posteriorly and developing some in the right base now, left side fairly clear  Abd: Thin soft positive bowel sounds  Ext: no edema; no joint swelling;  No clubbing  : clear urine  Neuro: Awake alert calm speech is clear moves all 4 extremities  Psych- no agitation, oriented to person;   Skin: warm, dry, no cyanosis;   Pulses: Brachial radial pulses intact  Capillary: Normal capillary refill    Labs:    Recent Labs     12/14/21  0732 12/13/21  0939   WBC 15.3* 17.1*   HGB 12.5 12.5   * 491*     Recent Labs     12/14/21  0732 12/13/21  0939    136   K 3.8 3.1*    102   CO2 27 29   GLU 96 145*   BUN 5* 5*   CREA 0.42* 0.51*   CA 8.0* 7.7*   PHOS 2.4*  --    ALB 1.3*  --    .  Pleural fluid WBC count 47,840 with 56% polys 32% lymphs pleural fluid protein 0.8 glucose 4  Pleural culture with  microaerophilic Streptococcus    Lab Results   Component Value Date/Time    Culture result: Heavy Streptococcus anginosus 12/09/2021 11:30 AM    Culture result: No growth 4 days 12/09/2021 07:42 AM    Culture result: No growth 6 days 12/08/2021 08:45 PM     Imaging:    CXR Results  (Last 48 hours)               12/12/21 0854  XR CHEST PORT Final result    Narrative:  Chest single view. Comparison single view chest December 11, 2021. Similar appearance for the right hemithorax. Predominant right basilar airspace opacity, similar volume. Unchanged hydropneumothorax. Similar degree apical pleural separation. Left lung aerated. Cardiac and mediastinal structures unchanged. To me the x-ray is improved you can start to see the right hemidiaphragm now implying further decrease in effusion in the apical pneumothorax is minimally improved       12/11/21 0752  XR CHEST PORT Final result    Narrative:  Chest single view. Comparison single view chest December 8, 2021. Right-sided hydropneumothorax. Estimated moderate volume pleural fluid. 1.1 cm   apical pleural separation. Left lung aerated. Cardiac and mediastinal structures   unchanged. Results from [place] encounter on 12/08/21    XR CHEST PORT    Narrative  Chest single view    Comparison single view chest 12/12/2021.     Similar appearance right hemithorax. Predominant right basilar opacity. Similar  known empyema. Apical air component with 2.1 cm pleural separation. Left lung aerated. Cardiac and mediastinal structures unchanged. XR CHEST PORT    Narrative  Chest single view. Comparison single view chest December 11, 2021. Similar appearance for the right hemithorax. Predominant right basilar airspace opacity, similar volume. Unchanged hydropneumothorax. Similar degree apical pleural separation. Left lung aerated. Cardiac and mediastinal structures unchanged. XR CHEST PORT    Narrative  Chest single view. Comparison single view chest December 8, 2021. Right-sided hydropneumothorax. Estimated moderate volume pleural fluid. 1.1 cm  apical pleural separation. Left lung aerated. Cardiac and mediastinal structures  unchanged. Results from [place] encounter on 12/08/21    CTA CHEST W OR W WO CONT    Narrative  Chest CTA    TECHNIQUE: Multiple continuous axial images were obtained from the thoracic  inlet to the upper abdomen after the uneventful administration of intravenous  contrast. Reformatted images as well as maximum intensity projection images were  obtained in the sagittal and coronal planes. Comment on dose reduction: All CT scans at this facility are performed using  dose reduction optimization technique as appropriate to perform the exam  including the following; automated exposure control, adjustments of the mA  and/or kV according to patient size, or use of iterative reconstructed  technique. Comparison examination: Chest radiograph dated same day    Findings:  LYMPHADENOPATHY: There is no axillary, mediastinal, or hilar lymphadenopathy by  CT size criteria. CARDIOVASCULAR: Heart normal in size. Thoracic aorta normal in caliber. LUNGS AND PLEURA: Large multiloculated right pleural effusion with subsequent  near complete atelectasis of the right lung.  Tree-in-bud centrilobular nodules  present throughout the left lower lobe with larger nodular airspace opacity  apical posterior segment left upper lobe. Findings consistent with  bronchiolitis/bronchopneumonia. The larger airspace nodule in the apical  posterior segment left upper lobe require surveillance. INCLUDED ABDOMEN: The visualized upper abdomen images normally. CHEST WALL: Degenerative changes are present throughout the thoracic spine. No  suspicious lytic or blastic lesion is identified. Impression  Large multiloculated right pleural effusion with subsequent near complete  atelectasis of the right lung. Therapeutic and diagnostic thoracentesis  recommended. Tree-in-bud centrilobular nodules present throughout the left lower lobe with  larger nodular airspace opacity apical posterior segment left upper lobe. Findings consistent with bronchiolitis/bronchopneumonia. The larger airspace  nodule in the apical posterior segment left upper lobe require surveillance. · 12/9 discussion 2-week history of pleuritic pain cough chills sweats fever and a drinker who will drink to the point of falling asleep most likely has aspiration pneumonia with empyema. IR has been consulted for joellen peters. He very likely will have multiple loculations preventing drainage and may need VATS  · 12/11 feels fine respirations nonlabored has just an occasional air bubble when he coughs chest x-ray with small apical pneumothorax significant fluid remains although it is certainly improved culture consistent with oral portillo to remain on Zosyn WBC count improved we will discontinue vancomycin  · 12/12 doing well at this point no air leak seen on chest tube and chest x-ray is mildly improved. Continue current treatment  · 12/13 drainage has decreased from the chest catheter. Will flush with saline and follow drainage.   Chest x-ray is improved diaphragm can be seen on the right with infiltrate in that area likely representing pneumonia cannot tell how much fluid remains    Vereniceomid Tripp MD

## 2021-12-14 NOTE — PROGRESS NOTES
Hospitalist Progress Note               Daily Progress Note: 12/14/2021      Subjective: The patient is seen for follow up. He is a 66-year-old male with history of hypertension and asthma who was transferred from [facility] after he was presented there with shortness of breath and productive cough x2 weeks as well as right-sided pleuritic chest pain. Labs showed a white count of 34,000 and mild lactic acidosis. CT of the chest showed a large multiloculated right pleural effusion as well as a left apical nodule and tree-in-bud centrilobular nodules throughout the left lower lobe. Patient was hypoxic and requiring 4 L nasal cannula. Patient is a chronic smoker     He was started on Zosyn and vancomycin     -------  Today, patient seen at bedside, resting in no distress. Due to low chest tube drainage, catheter flushing every 6 hour requested to nursing. Temp down, WBC going down as well. Drainage changed from claudia pus to serosanguinous drainage. Cardiovascular surgery recommeneded decortication for recovery. Stay on IV antibiotics with chest tube until decortication on Dec 17 or later. Patient communicated understanding of needing surgery after Friday, probably next Tuesday. Denies other complaints at this time. He denies shortness of breath, chest pain, headache, dizziness. He notes that he has ambulated around the room yesterday when his mother and girlfriend came to visit him. He notes some lumbago that radiates bilaterally in a horizontal plane. Will continue to monitor.     Problem List:  Problem List as of 12/14/2021 Never Reviewed          Codes Class Noted - Resolved    Pleural effusion ICD-10-CM: J90  ICD-9-CM: 511.9  12/8/2021 - Present              Medications reviewed  Current Facility-Administered Medications   Medication Dose Route Frequency    albuterol (PROVENTIL HFA, VENTOLIN HFA, PROAIR HFA) inhaler 2 Puff  2 Puff Inhalation Q6HWA RT    budesonide-formoteroL (SYMBICORT) 160-4.5 mcg/actuation HFA inhaler 2 Puff  2 Puff Inhalation BID RT    0.9% sodium chloride infusion  100 mL/hr IntraVENous CONTINUOUS    amLODIPine (NORVASC) tablet 5 mg  5 mg Oral DAILY    sodium chloride (NS) flush 5-40 mL  5-40 mL IntraVENous Q8H    sodium chloride (NS) flush 5-40 mL  5-40 mL IntraVENous PRN    acetaminophen (TYLENOL) tablet 650 mg  650 mg Oral Q6H PRN    Or    acetaminophen (TYLENOL) suppository 650 mg  650 mg Rectal Q6H PRN    polyethylene glycol (MIRALAX) packet 17 g  17 g Oral DAILY PRN    ondansetron (ZOFRAN ODT) tablet 4 mg  4 mg Oral Q8H PRN    Or    ondansetron (ZOFRAN) injection 4 mg  4 mg IntraVENous Q6H PRN    enoxaparin (LOVENOX) injection 40 mg  40 mg SubCUTAneous DAILY    piperacillin-tazobactam (ZOSYN) 3.375 g in 0.9% sodium chloride (MBP/ADV) 100 mL MBP  3.375 g IntraVENous Q8H    influenza vaccine  (6 mos+)(PF) (FLUARIX/FLULAVAL/FLUZONE QUAD) injection 0.5 mL  1 Each IntraMUSCular PRIOR TO DISCHARGE    thiamine mononitrate (B-1) tablet 100 mg  100 mg Oral DAILY    0.9% sodium chloride infusion 10 mL  10 mL Irrigation CONTINUOUS    oxyCODONE IR (ROXICODONE) tablet 5 mg  5 mg Oral Q6H PRN       Review of Systems:   A comprehensive review of systems was negative except for that written in the HPI. Objective:   Physical Exam:     Visit Vitals  /87   Pulse 86   Temp 98.6 °F (37 °C)   Resp 16   Ht 5' 11\" (1.803 m)   Wt 162 lb (73.5 kg)   SpO2 95%   BMI 22.59 kg/m²    O2 Flow Rate (L/min): 1.5 l/min O2 Device: None (Room air)    Temp (24hrs), Av.9 °F (37.2 °C), Min:98.4 °F (36.9 °C), Max:99.5 °F (37.5 °C)    No intake/output data recorded.  1901 -  0700  In: 700 [P.O.:700]  Out: 1100 [Urine:1100]    General:   Awake and alert   Lungs:    Decreased breath sounds right lung field   Chest wall:  No tenderness or deformity. Heart:  Regular rate and rhythm, S1, S2 normal, no murmur, click, rub or gallop. Abdomen:   Soft, non-tender.  Bowel sounds normal. No masses,  No organomegaly. Extremities: Extremities normal, atraumatic, no cyanosis or edema. Pulses: 2+ and symmetric all extremities. Skin: Skin color, texture, turgor normal. No rashes or lesions   Neurologic: CNII-XII intact. No gross focal deficits         Data Review:       Recent Days:  Recent Labs     12/13/21  0939   WBC 17.1*   HGB 12.5   HCT 36.2*   *     Recent Labs     12/13/21  0939      K 3.1*      CO2 29   *   BUN 5*   CREA 0.51*   CA 7.7*     No results for input(s): PH, PCO2, PO2, HCO3, FIO2 in the last 72 hours. 24 Hour Results:  Recent Results (from the past 24 hour(s))   CBC WITH AUTOMATED DIFF    Collection Time: 12/13/21  9:39 AM   Result Value Ref Range    WBC 17.1 (H) 4.1 - 11.1 K/uL    RBC 4.42 4.10 - 5.70 M/uL    HGB 12.5 12.1 - 17.0 g/dL    HCT 36.2 (L) 36.6 - 50.3 %    MCV 81.9 80.0 - 99.0 FL    MCH 28.3 26.0 - 34.0 PG    MCHC 34.5 30.0 - 36.5 g/dL    RDW 12.7 11.5 - 14.5 %    PLATELET 129 (H) 926 - 400 K/uL    MPV 10.3 8.9 - 12.9 FL    NRBC 0.0 0.0  WBC    ABSOLUTE NRBC 0.00 0.00 - 0.01 K/uL    NEUTROPHILS 81 (H) 32 - 75 %    LYMPHOCYTES 9 (L) 12 - 49 %    MONOCYTES 7 5 - 13 %    EOSINOPHILS 1 0 - 7 %    BASOPHILS 0 0 - 1 %    IMMATURE GRANULOCYTES 2 (H) 0 - 0.5 %    ABS. NEUTROPHILS 13.7 (H) 1.8 - 8.0 K/UL    ABS. LYMPHOCYTES 1.6 0.8 - 3.5 K/UL    ABS. MONOCYTES 1.2 (H) 0.0 - 1.0 K/UL    ABS. EOSINOPHILS 0.1 0.0 - 0.4 K/UL    ABS. BASOPHILS 0.1 0.0 - 0.1 K/UL    ABS. IMM.  GRANS. 0.4 (H) 0.00 - 0.04 K/UL    DF AUTOMATED     METABOLIC PANEL, BASIC    Collection Time: 12/13/21  9:39 AM   Result Value Ref Range    Sodium 136 136 - 145 mmol/L    Potassium 3.1 (L) 3.5 - 5.1 mmol/L    Chloride 102 97 - 108 mmol/L    CO2 29 21 - 32 mmol/L    Anion gap 5 5 - 15 mmol/L    Glucose 145 (H) 65 - 100 mg/dL    BUN 5 (L) 6 - 20 mg/dL    Creatinine 0.51 (L) 0.70 - 1.30 mg/dL    BUN/Creatinine ratio 10 (L) 12 - 20      GFR est AA >60 >60 ml/min/1.73m2 GFR est non-AA >60 >60 ml/min/1.73m2    Calcium 7.7 (L) 8.5 - 10.1 mg/dL       XR CHEST PORT   Final Result      XR CHEST PORT   Final Result      XR CHEST PORT   Final Result      IR THORACENTESIS/INSERT CHEST TUBE   Final Result   Ultrasound of the right upper back was performed with images stored in PACS,   demonstrating heterogeneous collection most consistent with emphysema. Successful US-guided 8.5 percutaneous drainage catheter placement into right   empyema. Plan:   Continue flushing the catheter using 10 ml sterile normal saline and record the   output at least once a day. Consider catheter removal if there is less than 20 mL output over 24 hours. XR CHEST PORT    (Results Pending)   CT CHEST WO CONT    (Results Pending)        Assessment:  Large right marked multiloculated pleural effusion,  due parapneumonic/empyema    Bilateral community-acquired pneumonia    Sepsis due to above with fever, leukocytosis, elevated procalcitonin and tachycardia    Acute respiratory failure with hypoxia    Tobacco abuse    Alcohol abuse      Plan:  Continue IV Zosyn  NS flush  Obtain repeat electrolytes  Follow-up repeat chest x-ray  Monitoring until surgery after this Friday, possibly following Tuesday    Care Plan discussed with: Patient/Family and Dr. Emely Rico    Disposition: Continued inpatient care    Total time spent with patient: 30 minutes.     Nakia Rice

## 2021-12-14 NOTE — PROGRESS NOTES
CM reviewed chart and spoke with primary physician. Patient to be receiving surgery on Dec 21 if not transferred to another facility before then. CM will continue to follow patient for possible needs at discharge.

## 2021-12-15 ENCOUNTER — APPOINTMENT (OUTPATIENT)
Dept: GENERAL RADIOLOGY | Age: 51
DRG: 853 | End: 2021-12-15
Attending: INTERNAL MEDICINE
Payer: COMMERCIAL

## 2021-12-15 LAB
ANION GAP SERPL CALC-SCNC: 8 MMOL/L (ref 5–15)
BASOPHILS # BLD: 0.1 K/UL (ref 0–0.1)
BASOPHILS NFR BLD: 1 % (ref 0–1)
BUN SERPL-MCNC: 6 MG/DL (ref 6–20)
BUN/CREAT SERPL: 13 (ref 12–20)
CA-I BLD-MCNC: 8.3 MG/DL (ref 8.5–10.1)
CHLORIDE SERPL-SCNC: 104 MMOL/L (ref 97–108)
CO2 SERPL-SCNC: 26 MMOL/L (ref 21–32)
CREAT SERPL-MCNC: 0.46 MG/DL (ref 0.7–1.3)
DIFFERENTIAL METHOD BLD: ABNORMAL
EOSINOPHIL # BLD: 0.1 K/UL (ref 0–0.4)
EOSINOPHIL NFR BLD: 1 % (ref 0–7)
ERYTHROCYTE [DISTWIDTH] IN BLOOD BY AUTOMATED COUNT: 12.9 % (ref 11.5–14.5)
GLUCOSE SERPL-MCNC: 89 MG/DL (ref 65–100)
HCT VFR BLD AUTO: 35.6 % (ref 36.6–50.3)
HGB BLD-MCNC: 12.3 G/DL (ref 12.1–17)
IMM GRANULOCYTES # BLD AUTO: 0.3 K/UL (ref 0–0.04)
IMM GRANULOCYTES NFR BLD AUTO: 2 % (ref 0–0.5)
LYMPHOCYTES # BLD: 1.6 K/UL (ref 0.8–3.5)
LYMPHOCYTES NFR BLD: 9 % (ref 12–49)
MCH RBC QN AUTO: 28.4 PG (ref 26–34)
MCHC RBC AUTO-ENTMCNC: 34.6 G/DL (ref 30–36.5)
MCV RBC AUTO: 82.2 FL (ref 80–99)
MONOCYTES # BLD: 1.1 K/UL (ref 0–1)
MONOCYTES NFR BLD: 6 % (ref 5–13)
NEUTS SEG # BLD: 14.7 K/UL (ref 1.8–8)
NEUTS SEG NFR BLD: 81 % (ref 32–75)
NRBC # BLD: 0 K/UL (ref 0–0.01)
NRBC BLD-RTO: 0 PER 100 WBC
PLATELET # BLD AUTO: 583 K/UL (ref 150–400)
PMV BLD AUTO: 9.9 FL (ref 8.9–12.9)
POTASSIUM SERPL-SCNC: 3.7 MMOL/L (ref 3.5–5.1)
RBC # BLD AUTO: 4.33 M/UL (ref 4.1–5.7)
SODIUM SERPL-SCNC: 138 MMOL/L (ref 136–145)
WBC # BLD AUTO: 17.8 K/UL (ref 4.1–11.1)

## 2021-12-15 PROCEDURE — 77010033678 HC OXYGEN DAILY

## 2021-12-15 PROCEDURE — 94640 AIRWAY INHALATION TREATMENT: CPT

## 2021-12-15 PROCEDURE — 71045 X-RAY EXAM CHEST 1 VIEW: CPT

## 2021-12-15 PROCEDURE — 80048 BASIC METABOLIC PNL TOTAL CA: CPT

## 2021-12-15 PROCEDURE — 74011000258 HC RX REV CODE- 258: Performed by: INTERNAL MEDICINE

## 2021-12-15 PROCEDURE — 36415 COLL VENOUS BLD VENIPUNCTURE: CPT

## 2021-12-15 PROCEDURE — 65270000029 HC RM PRIVATE

## 2021-12-15 PROCEDURE — 85025 COMPLETE CBC W/AUTO DIFF WBC: CPT

## 2021-12-15 PROCEDURE — 74011250637 HC RX REV CODE- 250/637: Performed by: INTERNAL MEDICINE

## 2021-12-15 PROCEDURE — 74011250636 HC RX REV CODE- 250/636: Performed by: INTERNAL MEDICINE

## 2021-12-15 PROCEDURE — 94760 N-INVAS EAR/PLS OXIMETRY 1: CPT

## 2021-12-15 RX ADMIN — PIPERACILLIN SODIUM AND TAZOBACTAM SODIUM 3.38 G: 3; .375 INJECTION, POWDER, LYOPHILIZED, FOR SOLUTION INTRAVENOUS at 09:24

## 2021-12-15 RX ADMIN — THIAMINE HCL TAB 100 MG 100 MG: 100 TAB at 09:21

## 2021-12-15 RX ADMIN — BUDESONIDE AND FORMOTEROL FUMARATE DIHYDRATE 2 PUFF: 160; 4.5 AEROSOL RESPIRATORY (INHALATION) at 20:34

## 2021-12-15 RX ADMIN — AMLODIPINE BESYLATE 5 MG: 5 TABLET ORAL at 09:21

## 2021-12-15 RX ADMIN — Medication 10 ML: at 05:18

## 2021-12-15 RX ADMIN — ALBUTEROL SULFATE 2 PUFF: 108 INHALANT RESPIRATORY (INHALATION) at 20:34

## 2021-12-15 RX ADMIN — ALBUTEROL SULFATE 2 PUFF: 108 INHALANT RESPIRATORY (INHALATION) at 13:12

## 2021-12-15 RX ADMIN — ENOXAPARIN SODIUM 40 MG: 100 INJECTION SUBCUTANEOUS at 09:21

## 2021-12-15 RX ADMIN — PIPERACILLIN SODIUM AND TAZOBACTAM SODIUM 3.38 G: 3; .375 INJECTION, POWDER, LYOPHILIZED, FOR SOLUTION INTRAVENOUS at 18:02

## 2021-12-15 RX ADMIN — BUDESONIDE AND FORMOTEROL FUMARATE DIHYDRATE 2 PUFF: 160; 4.5 AEROSOL RESPIRATORY (INHALATION) at 07:50

## 2021-12-15 RX ADMIN — Medication 10 ML: at 23:20

## 2021-12-15 RX ADMIN — SODIUM CHLORIDE 100 ML/HR: 9 INJECTION, SOLUTION INTRAVENOUS at 15:49

## 2021-12-15 RX ADMIN — OXYCODONE 5 MG: 5 TABLET ORAL at 09:32

## 2021-12-15 RX ADMIN — ALBUTEROL SULFATE 2 PUFF: 108 INHALANT RESPIRATORY (INHALATION) at 07:50

## 2021-12-15 RX ADMIN — OXYCODONE 5 MG: 5 TABLET ORAL at 23:19

## 2021-12-15 RX ADMIN — PIPERACILLIN SODIUM AND TAZOBACTAM SODIUM 3.38 G: 3; .375 INJECTION, POWDER, LYOPHILIZED, FOR SOLUTION INTRAVENOUS at 02:00

## 2021-12-15 RX ADMIN — Medication 10 ML: at 15:48

## 2021-12-15 NOTE — PROGRESS NOTES
CM reviewed chart and spoke to primary physician. Patient will likely be having procedure here on 12/21/21. CM will continue to follow for any possible discharge needs after procedure. Original plan was for home/self. Patient not appropriate for PT/OT at this time. And will likely need to be seen after procedure. If patient does need rehab of some sort authorization will be needed.

## 2021-12-15 NOTE — PROGRESS NOTES
Problem: Falls - Risk of  Goal: *Absence of Falls  Description: Document Kati Ward Fall Risk and appropriate interventions in the flowsheet.   Outcome: Progressing Towards Goal  Note: Fall Risk Interventions:  Mobility Interventions: Patient to call before getting OOB         Medication Interventions: Teach patient to arise slowly    Elimination Interventions: Call light in reach, Urinal in reach              Problem: Patient Education: Go to Patient Education Activity  Goal: Patient/Family Education  Outcome: Progressing Towards Goal     Problem: Pain  Goal: *Control of Pain  Outcome: Progressing Towards Goal  Goal: *PALLIATIVE CARE:  Alleviation of Pain  Outcome: Progressing Towards Goal     Problem: Patient Education: Go to Patient Education Activity  Goal: Patient/Family Education  Outcome: Progressing Towards Goal

## 2021-12-15 NOTE — PROGRESS NOTES
Consult  Pulmonary, Critical Care    Name: Jeancarlos Aguilar MRN: [id number]   : 1970 Hospital: [address] [place] [address]   Date: 12/15/2021  Admission date: 2021 Hospital Day: 8       Subjective/Interval History:   Seen on the medical floor awake alert. History is 2 or so weeks of breathing difficulty. Started with severe chest pain pleuritic with chills and sweats he did not take his temperature but felt hot at times. He has had continued worsening presented to the emergency room has a large right pleural effusion. CT scan suggests multiloculated effusion. Leukocytosis up to 34,000 all consistent with empyema. He is a drinker of 1-2 sixpacks a day. Does drink to the point of falling asleep although he states he is never passed out. 12/10 percutaneous drainage catheter placed by IR yesterday with 1600 cc drainage overnight and so far today 700  12/11 chest catheter continues to drain he states he feels a little better. Chest x-ray today has small apical pneumothorax with significant amount of remaining pleural fluid   awake alert states he feels better   no specific complaints respirations  12/15    Hospital Problems  Never Reviewed          Codes Class Noted POA    Pleural effusion ICD-10-CM: J90  ICD-9-CM: 511.9  2021 Unknown              IMPRESSION:   1. Right empyema culture  microaerophilic Streptococcus remains on Zosyn  2. Aspiration pneumonia  3. Nodular density left lung probably aspiration pneumonia have to follow to make sure it clears  4. Hyponatremia improved  5. Alcohol abuse  6. COPD exacerbation  Body mass index is 22.59 kg/m². RECOMMENDATIONS/PLAN:   1. Only 100 cc chest tube drainage recorded overnight. Will ask nursing to flush the catheter every 6 hour  2. No wheezing  bronchodilators now inhalers  3. Continue IV Zosyn WBC count continues to improve  4. No evidence of developing DTs  5. Continue thiamine  6.  Smoking cessation counseling done [x] High complexity decision making was performed  [x] See my orders for details      Subjective/Initial History:     I was asked by Nicole Franco MD to see Nahomy Cornell  a 46 y.o.  male in consultation for a chief complaint of multiloculated right pleural effusion likely empyema      No Known Allergies     MAR reviewed and pertinent medications noted or modified as needed     Current Facility-Administered Medications   Medication    albuterol (PROVENTIL HFA, VENTOLIN HFA, PROAIR HFA) inhaler 2 Puff    budesonide-formoteroL (SYMBICORT) 160-4.5 mcg/actuation HFA inhaler 2 Puff    0.9% sodium chloride infusion    amLODIPine (NORVASC) tablet 5 mg    sodium chloride (NS) flush 5-40 mL    sodium chloride (NS) flush 5-40 mL    acetaminophen (TYLENOL) tablet 650 mg    Or    acetaminophen (TYLENOL) suppository 650 mg    polyethylene glycol (MIRALAX) packet 17 g    ondansetron (ZOFRAN ODT) tablet 4 mg    Or    ondansetron (ZOFRAN) injection 4 mg    enoxaparin (LOVENOX) injection 40 mg    piperacillin-tazobactam (ZOSYN) 3.375 g in 0.9% sodium chloride (MBP/ADV) 100 mL MBP    influenza vaccine 2021-22 (6 mos+)(PF) (FLUARIX/FLULAVAL/FLUZONE QUAD) injection 0.5 mL    thiamine mononitrate (B-1) tablet 100 mg    0.9% sodium chloride infusion 10 mL    oxyCODONE IR (ROXICODONE) tablet 5 mg      Patient PCP: None  PMH:  has a past medical history of Asthma and Ill-defined condition. PSH:   has a past surgical history that includes ir thoracentesis/insert chest tube (12/9/2021). FHX: family history is not on file. SHX:  reports that he has quit smoking. He has never used smokeless tobacco. He reports current alcohol use. He reports previous drug use. To me he admits smoking 1 to 2 packs/day ever since he was 8 or 10.   He drinks 1-2 sixpacks a day beer     Systemic review:  General states his weight stable has been having chills and fever for the last 2 weeks or more  Eyes no double vision or momentary blindness  ENT no drainage or facial pain  Musculoskeletal no swollen tender joints  Endocrinologic no polyuria polydipsia  Neurologic no seizures or syncope  Gastrointestinal no nausea vomiting acid indigestion. Genitourinary no pain or discomfort on urination  Cardiovascular no history of heart disease or ankle edema he has had the pleuritic chest pain no diaphoresis  Respiratory as mentioned cough some yellow sputum fever chills shortness of breath and pleuritic pain on the right    Objective:     Vital Signs: Telemetry:    normal sinus rhythm Intake/Output:   Visit Vitals  /65 (BP 1 Location: Left upper arm, BP Patient Position: Semi fowlers)   Pulse 98   Temp 98.7 °F (37.1 °C)   Resp 18   Ht 5' 11\" (1.803 m)   Wt 73.5 kg (162 lb)   SpO2 94%   BMI 22.59 kg/m²       Temp (24hrs), Av.2 °F (37.3 °C), Min:98.7 °F (37.1 °C), Max:99.6 °F (37.6 °C)        O2 Device: Nasal cannula O2 Flow Rate (L/min): 1.5 l/min       Wt Readings from Last 4 Encounters:   21 73.5 kg (162 lb)   21 73.5 kg (162 lb)          Intake/Output Summary (Last 24 hours) at 12/15/2021 1102  Last data filed at 12/15/2021 0947  Gross per 24 hour   Intake 600 ml   Output 3255 ml   Net -2655 ml       Last shift:      12/15 07 - 12/15 1900  In: -   Out: 1080 [Urine:1030; Drains:50]  Last 3 shifts: 1901 - 12/15 07  In: 1600 [P.O.:1600]  Out: 4625 [Urine:4375; Drains:250]       Physical Exam:   General:  male; lying in bed no distress no accessory muscle usage  HEENT: NCAT, poor dentition,  Eyes: anicteric; conjunctiva clear extraocular movements intact  Neck: no nodes, no JVD, no accessory MM use. Chest: no deformity,   Cardiac: Regular rate and rhythm  Lungs: Markedly improved breath sounds on the right present anteriorly and upper lung posteriorly and developing some in the right base now, left side fairly clear  Abd: Thin soft positive bowel sounds  Ext: no edema; no joint swelling;  No clubbing  : clear urine  Neuro: Awake alert calm speech is clear moves all 4 extremities  Psych- no agitation, oriented to person;   Skin: warm, dry, no cyanosis;   Pulses: Brachial radial pulses intact  Capillary: Normal capillary refill    Labs:    Recent Labs     12/15/21  0906 12/14/21  0732 12/13/21  0939   WBC 17.8* 15.3* 17.1*   HGB 12.3 12.5 12.5   * 531* 491*     Recent Labs     12/15/21  0906 12/14/21  0732 12/13/21  0939    139 136   K 3.7 3.8 3.1*    106 102   CO2 26 27 29   GLU 89 96 145*   BUN 6 5* 5*   CREA 0.46* 0.42* 0.51*   CA 8.3* 8.0* 7.7*   PHOS  --  2.4*  --    ALB  --  1.3*  --    .  Pleural fluid WBC count 47,840 with 56% polys 32% lymphs pleural fluid protein 0.8 glucose 4  Pleural culture with  microaerophilic Streptococcus    Lab Results   Component Value Date/Time    Culture result: Heavy Streptococcus anginosus 12/09/2021 11:30 AM    Culture result: No growth 5 days 12/09/2021 07:42 AM    Culture result: No growth 6 days 12/08/2021 08:45 PM     Imaging:    CXR Results  (Last 48 hours)               12/12/21 0854  XR CHEST PORT Final result    Narrative:  Chest single view. Comparison single view chest December 11, 2021. Similar appearance for the right hemithorax. Predominant right basilar airspace opacity, similar volume. Unchanged hydropneumothorax. Similar degree apical pleural separation. Left lung aerated. Cardiac and mediastinal structures unchanged. To me the x-ray is improved you can start to see the right hemidiaphragm now implying further decrease in effusion in the apical pneumothorax is minimally improved       12/11/21 0752  XR CHEST PORT Final result    Narrative:  Chest single view. Comparison single view chest December 8, 2021. Right-sided hydropneumothorax. Estimated moderate volume pleural fluid. 1.1 cm   apical pleural separation. Left lung aerated. Cardiac and mediastinal structures   unchanged. Results from [facility] encounter on 12/08/21    XR CHEST PORT    Narrative  Chest single view    Comparison single view chest 12/12/2021. Similar appearance right hemithorax. Predominant right basilar opacity. Similar  known empyema. Apical air component with 2.1 cm pleural separation. Left lung aerated. Cardiac and mediastinal structures unchanged. XR CHEST PORT    Narrative  Chest single view. Comparison single view chest December 11, 2021. Similar appearance for the right hemithorax. Predominant right basilar airspace opacity, similar volume. Unchanged hydropneumothorax. Similar degree apical pleural separation. Left lung aerated. Cardiac and mediastinal structures unchanged. XR CHEST PORT    Narrative  Chest single view. Comparison single view chest December 8, 2021. Right-sided hydropneumothorax. Estimated moderate volume pleural fluid. 1.1 cm  apical pleural separation. Left lung aerated. Cardiac and mediastinal structures  unchanged. Results from [facility] encounter on 12/08/21    CTA CHEST W OR W WO CONT    Narrative  Chest CTA    TECHNIQUE: Multiple continuous axial images were obtained from the thoracic  inlet to the upper abdomen after the uneventful administration of intravenous  contrast. Reformatted images as well as maximum intensity projection images were  obtained in the sagittal and coronal planes. Comment on dose reduction: All CT scans at this facility are performed using  dose reduction optimization technique as appropriate to perform the exam  including the following; automated exposure control, adjustments of the mA  and/or kV according to patient size, or use of iterative reconstructed  technique. Comparison examination: Chest radiograph dated same day    Findings:  LYMPHADENOPATHY: There is no axillary, mediastinal, or hilar lymphadenopathy by  CT size criteria. CARDIOVASCULAR: Heart normal in size.  Thoracic aorta normal in caliber. LUNGS AND PLEURA: Large multiloculated right pleural effusion with subsequent  near complete atelectasis of the right lung. Tree-in-bud centrilobular nodules  present throughout the left lower lobe with larger nodular airspace opacity  apical posterior segment left upper lobe. Findings consistent with  bronchiolitis/bronchopneumonia. The larger airspace nodule in the apical  posterior segment left upper lobe require surveillance. INCLUDED ABDOMEN: The visualized upper abdomen images normally. CHEST WALL: Degenerative changes are present throughout the thoracic spine. No  suspicious lytic or blastic lesion is identified. Impression  Large multiloculated right pleural effusion with subsequent near complete  atelectasis of the right lung. Therapeutic and diagnostic thoracentesis  recommended. Tree-in-bud centrilobular nodules present throughout the left lower lobe with  larger nodular airspace opacity apical posterior segment left upper lobe. Findings consistent with bronchiolitis/bronchopneumonia. The larger airspace  nodule in the apical posterior segment left upper lobe require surveillance. · 12/9 discussion 2-week history of pleuritic pain cough chills sweats fever and a drinker who will drink to the point of falling asleep most likely has aspiration pneumonia with empyema. IR has been consulted for joellen peters. He very likely will have multiple loculations preventing drainage and may need VATS  · 12/11 feels fine respirations nonlabored has just an occasional air bubble when he coughs chest x-ray with small apical pneumothorax significant fluid remains although it is certainly improved culture consistent with oral portillo to remain on Zosyn WBC count improved we will discontinue vancomycin  · 12/12 doing well at this point no air leak seen on chest tube and chest x-ray is mildly improved. Continue current treatment  · 12/13 drainage has decreased from the chest catheter.   Will flush with saline and follow drainage.   Chest x-ray is improved diaphragm can be seen on the right with infiltrate in that area likely representing pneumonia cannot tell how much fluid remains  · 12/15 chest x-ray today    Padmini Huff MD

## 2021-12-15 NOTE — PROGRESS NOTES
Hospitalist Progress Note               Daily Progress Note: 12/15/2021      Subjective: The patient is seen for follow up. He is a 18-year-old male with history of hypertension and asthma who was transferred from [facility] after he was presented there with shortness of breath and productive cough x2 weeks as well as right-sided pleuritic chest pain. Labs showed a white count of 34,000 and mild lactic acidosis. CT of the chest showed a large multiloculated right pleural effusion as well as a left apical nodule and tree-in-bud centrilobular nodules throughout the left lower lobe. Patient was hypoxic and requiring 4 L nasal cannula. Patient is a chronic smoker     He was started on Zosyn and vancomycin     -------   Today, patient seen at bedside, resting in no distress. Due to low chest tube drainage, catheter flushing every 6 hour requested to nursing. Drainage changed from claudia pus to serosanguinous drainage. Cardiovascular surgery recommeneded decortication for recovery. Stay on IV antibiotics with chest tube until decortication on Dec 17 or later. Patient communicated understanding of needing surgery after Friday, probably next Tuesday. Denies other complaints at this time. He denies shortness of breath, chest pain, headache, dizziness. WBC elevated back up at 17,800, platelet continues to rise at 583,000. Patient waiting for decortication next week. Vitals stable. Will continue to monitor.     Problem List:  Problem List as of 12/15/2021 Never Reviewed          Codes Class Noted - Resolved    Pleural effusion ICD-10-CM: J90  ICD-9-CM: 511.9  12/8/2021 - Present              Medications reviewed  Current Facility-Administered Medications   Medication Dose Route Frequency    albuterol (PROVENTIL HFA, VENTOLIN HFA, PROAIR HFA) inhaler 2 Puff  2 Puff Inhalation Q6HWA RT    budesonide-formoteroL (SYMBICORT) 160-4.5 mcg/actuation HFA inhaler 2 Puff  2 Puff Inhalation BID RT    0.9% sodium chloride infusion  100 mL/hr IntraVENous CONTINUOUS    amLODIPine (NORVASC) tablet 5 mg  5 mg Oral DAILY    sodium chloride (NS) flush 5-40 mL  5-40 mL IntraVENous Q8H    sodium chloride (NS) flush 5-40 mL  5-40 mL IntraVENous PRN    acetaminophen (TYLENOL) tablet 650 mg  650 mg Oral Q6H PRN    Or    acetaminophen (TYLENOL) suppository 650 mg  650 mg Rectal Q6H PRN    polyethylene glycol (MIRALAX) packet 17 g  17 g Oral DAILY PRN    ondansetron (ZOFRAN ODT) tablet 4 mg  4 mg Oral Q8H PRN    Or    ondansetron (ZOFRAN) injection 4 mg  4 mg IntraVENous Q6H PRN    enoxaparin (LOVENOX) injection 40 mg  40 mg SubCUTAneous DAILY    piperacillin-tazobactam (ZOSYN) 3.375 g in 0.9% sodium chloride (MBP/ADV) 100 mL MBP  3.375 g IntraVENous Q8H    influenza vaccine  (6 mos+)(PF) (FLUARIX/FLULAVAL/FLUZONE QUAD) injection 0.5 mL  1 Each IntraMUSCular PRIOR TO DISCHARGE    thiamine mononitrate (B-1) tablet 100 mg  100 mg Oral DAILY    0.9% sodium chloride infusion 10 mL  10 mL Irrigation CONTINUOUS    oxyCODONE IR (ROXICODONE) tablet 5 mg  5 mg Oral Q6H PRN       Review of Systems:   A comprehensive review of systems was negative except for that written in the HPI. Objective:   Physical Exam:     Visit Vitals  /65 (BP 1 Location: Left upper arm, BP Patient Position: Semi fowlers)   Pulse 98   Temp 98.7 °F (37.1 °C)   Resp 18   Ht 5' 11\" (1.803 m)   Wt 73.5 kg (162 lb)   SpO2 94%   BMI 22.59 kg/m²    O2 Flow Rate (L/min): 1.5 l/min O2 Device: Nasal cannula    Temp (24hrs), Av.2 °F (37.3 °C), Min:98.7 °F (37.1 °C), Max:99.6 °F (37.6 °C)    12/15 0701 - 12/15 190  In: -   Out:  [OZCBB:1325; Drains:50]   1901 - 12/15 0700  In: 1600 [P.O.:1600]  Out: 4625 [CWOSO:7469; Drains:250]    General:   Awake and alert   Lungs:    Decreased breath sounds right lung field   Chest wall:  No tenderness or deformity. Heart:  Regular rate and rhythm, S1, S2 normal, no murmur, click, rub or gallop.    Abdomen: Soft, non-tender. Bowel sounds normal. No masses,  No organomegaly. Extremities: Extremities normal, atraumatic, no cyanosis or edema. Pulses: 2+ and symmetric all extremities. Skin: Skin color, texture, turgor normal. No rashes or lesions   Neurologic: CNII-XII intact. No gross focal deficits         Data Review:       Recent Days:  Recent Labs     12/15/21  0906 12/14/21  0732 12/13/21  0939   WBC 17.8* 15.3* 17.1*   HGB 12.3 12.5 12.5   HCT 35.6* 36.9 36.2*   * 531* 491*     Recent Labs     12/15/21  0906 12/14/21  0732 12/13/21  0939    139 136   K 3.7 3.8 3.1*    106 102   CO2 26 27 29   GLU 89 96 145*   BUN 6 5* 5*   CREA 0.46* 0.42* 0.51*   CA 8.3* 8.0* 7.7*   PHOS  --  2.4*  --    ALB  --  1.3*  --      No results for input(s): PH, PCO2, PO2, HCO3, FIO2 in the last 72 hours. 24 Hour Results:  Recent Results (from the past 24 hour(s))   CBC WITH AUTOMATED DIFF    Collection Time: 12/15/21  9:06 AM   Result Value Ref Range    WBC 17.8 (H) 4.1 - 11.1 K/uL    RBC 4.33 4.10 - 5.70 M/uL    HGB 12.3 12.1 - 17.0 g/dL    HCT 35.6 (L) 36.6 - 50.3 %    MCV 82.2 80.0 - 99.0 FL    MCH 28.4 26.0 - 34.0 PG    MCHC 34.6 30.0 - 36.5 g/dL    RDW 12.9 11.5 - 14.5 %    PLATELET 490 (H) 439 - 400 K/uL    MPV 9.9 8.9 - 12.9 FL    NRBC 0.0 0.0  WBC    ABSOLUTE NRBC 0.00 0.00 - 0.01 K/uL    NEUTROPHILS 81 (H) 32 - 75 %    LYMPHOCYTES 9 (L) 12 - 49 %    MONOCYTES 6 5 - 13 %    EOSINOPHILS 1 0 - 7 %    BASOPHILS 1 0 - 1 %    IMMATURE GRANULOCYTES 2 (H) 0 - 0.5 %    ABS. NEUTROPHILS 14.7 (H) 1.8 - 8.0 K/UL    ABS. LYMPHOCYTES 1.6 0.8 - 3.5 K/UL    ABS. MONOCYTES 1.1 (H) 0.0 - 1.0 K/UL    ABS. EOSINOPHILS 0.1 0.0 - 0.4 K/UL    ABS. BASOPHILS 0.1 0.0 - 0.1 K/UL    ABS. IMM.  GRANS. 0.3 (H) 0.00 - 0.04 K/UL    DF AUTOMATED     METABOLIC PANEL, BASIC    Collection Time: 12/15/21  9:06 AM   Result Value Ref Range    Sodium 138 136 - 145 mmol/L    Potassium 3.7 3.5 - 5.1 mmol/L    Chloride 104 97 - 108 mmol/L    CO2 26 21 - 32 mmol/L    Anion gap 8 5 - 15 mmol/L    Glucose 89 65 - 100 mg/dL    BUN 6 6 - 20 mg/dL    Creatinine 0.46 (L) 0.70 - 1.30 mg/dL    BUN/Creatinine ratio 13 12 - 20      GFR est AA >60 >60 ml/min/1.73m2    GFR est non-AA >60 >60 ml/min/1.73m2    Calcium 8.3 (L) 8.5 - 10.1 mg/dL       XR CHEST PORT   Final Result      XR CHEST PORT   Final Result      XR CHEST PORT   Final Result      IR THORACENTESIS/INSERT CHEST TUBE   Final Result   Ultrasound of the right upper back was performed with images stored in PACS,   demonstrating heterogeneous collection most consistent with emphysema. Successful US-guided 8.5 percutaneous drainage catheter placement into right   empyema. Plan:   Continue flushing the catheter using 10 ml sterile normal saline and record the   output at least once a day. Consider catheter removal if there is less than 20 mL output over 24 hours. XR CHEST PORT    (Results Pending)   CT CHEST WO CONT    (Results Pending)   XR CHEST PORT    (Results Pending)        Assessment:  Large right marked multiloculated pleural effusion,  due parapneumonic/empyema    Bilateral community-acquired pneumonia    Sepsis due to above with fever, leukocytosis, elevated procalcitonin and tachycardia    Acute respiratory failure with hypoxia    Tobacco abuse    Alcohol abuse      Plan:  Continue supportive care including IV Zosyn  Follow-up thoracic surgery recommendations  Anticipate decortication next Tuesday  Case discussed with Dr. Devon Rhodes discussed with: Patient/Family and Dr. Esther Alcala    Disposition: Continued inpatient care    Total time spent with patient: 30 minutes.     Nandini Suazo MD

## 2021-12-15 NOTE — PROGRESS NOTES
Nutrition Assessment     Type and Reason for Visit: FARNAZ nutrition re-screen/LOS    Nutrition Recommendations/Plan:   Continue regular diet  Monitor need for ONS    Nutrition Assessment:  Dx pleural effusion. Pt reports good intakes, eating >75% of trays since admit. Noted lots of outside snack at bedside like cheetos. Pt reports meals are filling him up. Denies need for ONS. No wt loss. Pt is thinner, but appears nourished. No nutrition concerns at this time. Labs and Meds reviewed.      Malnutrition Assessment:  Malnutrition Status: No malnutrition     Current Nutrition Therapies:  ADULT DIET Regular    Electronically signed by Deisy Brian RD on 12/15/2021 at 1:41 PM    Contact Number:

## 2021-12-15 NOTE — ADT AUTH CERT NOTES
Pleural Effusion - Care Day 6 (12/13/2021) by Urbano Reynoso       Review Entered Review Status   12/14/2021 14:40 Completed      Criteria Review      Care Day: 6 Care Date: 12/13/2021 Level of Care: Telemetry    Guideline Day 3    Clinical Status    (X) * Hemodynamic stability    12/14/2021 14:40:20 EST by Urbano Reynoso      99.5, 88, 120/76    ( ) * CXR or ultrasound shows stability or improvement    ( ) * Infection absent or outpatient treatment planned    (X) * Tachypnea absent    12/14/2021 14:40:20 EST by Urbano Reynoso      16    (X) * Hypoxemia absent    12/14/2021 14:40:20 EST by Urbano Reynoso      94%    ( ) * Pain absent or managed    ( ) * Discharge plans and education understood    Activity    ( ) * Ambulatory or acceptable for next level of care    Routes    ( ) * Oral hydration    12/14/2021 14:40:20 EST by Urbano Reynoso      IVF 100ML/HR    ( ) * Oral medications or regimen acceptable for next level of care    12/14/2021 14:40:20 EST by Phyllis Perez 3.375G IV Q8HR    ( ) * Oral diet or acceptable for next level of care    12/14/2021 14:40:20 EST by Urbano Reynoso      NPO    Interventions    (X) * Oxygen absent or at baseline need    ( ) * Chest tube absent or outpatient care arranged    * Milestone   Additional Notes   12/13      ATTENDING-   Assessment:   Large right marked multiloculated pleural effusion,  due parapneumonic/empyema       Bilateral community-acquired pneumonia       Sepsis due to above with fever, leukocytosis, elevated procalcitonin and tachycardia       Acute respiratory failure with hypoxia       Tobacco abuse       Alcohol abuse           Plan:   Continue IV Zosyn       Obtain repeat electrolytes   Follow-up repeat chest x-ray   Await removal of chest tube            PULM-   IMPRESSION:   1. Right empyema culture  microaerophilic Streptococcus remains on Zosyn   2. Aspiration pneumonia   3.  Nodular density left lung probably aspiration pneumonia have to follow to make sure it clears   4. Hyponatremia improved   5. Alcohol abuse   6. COPD exacerbation   7. Body mass index is 22.59 kg/m². 7.         RECOMMENDATIONS/PLAN:   1. Only 100 cc chest tube drainage recorded overnight.  Will ask nursing to flush the catheter every 6 hour   2. No wheezing  bronchodilators now inhalers   3. Continue IV Zosyn WBC count continues to improve   4. No evidence of developing DTs   5. Continue thiamine   6. Smoking cessation counseling done               SURGEON-   Today he is doing well, no complaints, his temp is down and his WBC is also coming down as is drainage from the chest tube and consistency has changed from claudia Pus to serosangous drainage. Marcio Sizelang looks as expected with multiloculated effusions and pleural peop       His problem is a surgical one, and he can not recover without decortication. Surgery planned 12/21 12/13/2021 09:39   WBC: 17.1 (H)   NRBC: 0.0   RBC: 4.42   HGB: 12.5   HCT: 36.2 (L)   MCV: 81.9   MCH: 28.3   MCHC: 34.5   RDW: 12.7   PLATELET: 296 (H)   MPV: 10.3   NEUTROPHILS: 81 (H)   LYMPHOCYTES: 9 (L)   MONOCYTES: 7   EOSINOPHILS: 1   BASOPHILS: 0   IMMATURE GRANULOCYTES: 2 (H)   DF: AUTOMATED   ABSOLUTE NRBC: 0.00   ABS. NEUTROPHILS: 13.7 (H)   ABS. IMM. GRANS.: 0.4 (H)   ABS. LYMPHOCYTES: 1.6   ABS. MONOCYTES: 1.2 (H)   ABS. EOSINOPHILS: 0.1   ABS.  BASOPHILS: 0.1         12/13/2021 09:39   Sodium: 136   Potassium: 3.1 (L)   Chloride: 102   CO2: 29   Anion gap: 5   Glucose: 145 (H)   BUN: 5 (L)   Creatinine: 0.51 (L)   BUN/Creatinine ratio: 10 (L)   Calcium: 7.7 (L)   GFR est non-AA: >60   GFR est AA: >60              Pleural Effusion - Care Day 5 (12/12/2021) by Luba Winter RN       Review Entered Review Status   12/13/2021 16:09 Completed      Criteria Review      Care Day: 5 Care Date: 12/12/2021 Level of Care: Telemetry    Guideline Day 2    Level Of Care    (X) Floor    12/13/2021 16:09:06 EST by Luba Winter      [facility] bed Clinical Status    (X) * Hemodynamic stability    12/13/2021 16:09:06 EST by Ana Gardiner      98.7-88-20, 120/63, 94% on 1L nc    (X) * Respiratory distress absent    Activity    (X) Activity as tolerated    12/13/2021 16:09:06 EST by Ana Gardiner      w/assist    Routes    ( ) Oral hydration    12/13/2021 16:09:06 EST by Ana Gardiner      NS 100hr    ( ) Oral medications    12/13/2021 16:09:06 EST by Ana Gardiner      Alb inh q6 wa, Norvasc 5mg qd, Symbicort inh bid, Lovenox 40mg sc qd, Zosyn 3.375g IV q8, B1 100mg po qd, Delsym 30mg po q12h    (X) Usual diet    12/13/2021 16:09:06 EST by Ana Gardiner      Regular diet    Interventions    (X) Drainage or ablation procedure    12/13/2021 16:09:06 EST by Ana Gardiner      CHest cath to drain    ( ) Possibly discontinue chest tube    12/13/2021 16:09:06 EST by Ana Gardiner      flush chest cath q6 with 10cc strile saline    Medications    (X) Oral or parenteral analgesics    12/13/2021 16:09:06 EST by Ana Gardiner      Tylenol 650mg po x1    * Milestone   Additional Notes   12/12/21        CXR: Similar appearance for the right hemithorax. Predominant right basilar airspace opacity, similar volume. Unchanged hydropneumothorax. Similar degree apical pleural separation. Left lung aerated. Cardiac and mediastinal structures unchanged. HOSPITALIST      Today, patient seen at bedside, resting in mild distress due to discomfort. Patient did not want to eat breakfast this morning. He seems to be doing better other than the discomfort from catheter in place. He notes pain in epigastric area with cough. He denies chest pain, dizziness, shortness of breath, dyspnea. He noted ambulating to the bathroom. He would like to let his job [employer] in [place], [place] of his situation at the hospital so that he could get on disability. Catheter still draining, but CXR is better.       Blood culture no growth for 4 and 2 day.  3+ gram variable rods, 2+ WBC, >100 RBC, heavy probably microaerophilic streptococcus in drained pleural fluid analysis. Repeat CXR showed right-sided hydropneumothorax, 1.1 cm apical pleural separation. White count was down to 20 yesterday.       General: Awake and alert   Lungs:   Decreased breath sounds right lung field   Chest wall: No tenderness or deformity. Heart: Regular rate and rhythm, S1, S2 normal, no murmur, click, rub or gallop. Abdomen:   Soft, non-tender. Bowel sounds normal. No masses,  No organomegaly. Extremities: Extremities normal, atraumatic, no cyanosis or edema. Pulses: 2+ and symmetric all extremities. Skin: Skin color, texture, turgor normal. No rashes or lesions   Neurologic: CNII-XII intact.  No gross focal deficits       Assessment:   Large right marked multiloculated pleural effusion,  due parapneumonic/empyema       Bilateral community-acquired pneumonia       Sepsis due to above with fever, leukocytosis, elevated procalcitonin and tachycardia       Acute respiratory failure with hypoxia       Tobacco abuse       Alcohol abuse           Plan:   Continue vancomycin and Zosyn       Dr. Radha Truong already spoke with thoracic surgery who will be out for a week.  Patient will need IV antibiotics until he is able to undergo a VATS       Obtain repeat electrolytes          PULMONARY      IMPRESSION:   1. Right empyema culture  microaerophilic Streptococcus   2. Aspiration pneumonia   3. Nodular density left lung probably aspiration pneumonia have to follow to make sure it clears   4. Hyponatremia unchanged   5. Alcohol abuse   6. COPD exacerbation   7. Body mass index is 22.59 kg/m². 7.         RECOMMENDATIONS/PLAN:   1. Good drainage from right pleural catheter.  Chest x-ray continues to improve   2. No wheezing  bronchodilators now inhalers   3. Continue IV Zosyn   4. No evidence of developing DTs   5. Continue thiamine   6.  Smoking cessation counseling done

## 2021-12-16 ENCOUNTER — APPOINTMENT (OUTPATIENT)
Dept: GENERAL RADIOLOGY | Age: 51
DRG: 853 | End: 2021-12-16
Attending: THORACIC SURGERY (CARDIOTHORACIC VASCULAR SURGERY)
Payer: COMMERCIAL

## 2021-12-16 LAB
BACTERIA SPEC CULT: NORMAL
COVID-19 RAPID TEST, COVR: NOT DETECTED
SARS-COV-2, COV2: NORMAL
SPECIAL REQUESTS,SREQ: NORMAL
SPECIMEN SOURCE: NORMAL

## 2021-12-16 PROCEDURE — 71045 X-RAY EXAM CHEST 1 VIEW: CPT

## 2021-12-16 PROCEDURE — 65270000029 HC RM PRIVATE

## 2021-12-16 PROCEDURE — 94760 N-INVAS EAR/PLS OXIMETRY 1: CPT

## 2021-12-16 PROCEDURE — 87102 FUNGUS ISOLATION CULTURE: CPT

## 2021-12-16 PROCEDURE — 87635 SARS-COV-2 COVID-19 AMP PRB: CPT

## 2021-12-16 PROCEDURE — 87116 MYCOBACTERIA CULTURE: CPT

## 2021-12-16 PROCEDURE — 74011250637 HC RX REV CODE- 250/637: Performed by: INTERNAL MEDICINE

## 2021-12-16 PROCEDURE — 74011250636 HC RX REV CODE- 250/636: Performed by: INTERNAL MEDICINE

## 2021-12-16 PROCEDURE — 94640 AIRWAY INHALATION TREATMENT: CPT

## 2021-12-16 PROCEDURE — 86480 TB TEST CELL IMMUN MEASURE: CPT

## 2021-12-16 PROCEDURE — 74011000258 HC RX REV CODE- 258: Performed by: INTERNAL MEDICINE

## 2021-12-16 RX ORDER — OXYCODONE HYDROCHLORIDE 5 MG/1
5 TABLET ORAL
Status: DISCONTINUED | OUTPATIENT
Start: 2021-12-16 | End: 2021-12-21

## 2021-12-16 RX ADMIN — ALBUTEROL SULFATE 2 PUFF: 108 INHALANT RESPIRATORY (INHALATION) at 08:37

## 2021-12-16 RX ADMIN — OXYCODONE HYDROCHLORIDE 5 MG: 5 TABLET ORAL at 09:10

## 2021-12-16 RX ADMIN — PIPERACILLIN SODIUM AND TAZOBACTAM SODIUM 3.38 G: 3; .375 INJECTION, POWDER, LYOPHILIZED, FOR SOLUTION INTRAVENOUS at 02:00

## 2021-12-16 RX ADMIN — PIPERACILLIN SODIUM AND TAZOBACTAM SODIUM 3.38 G: 3; .375 INJECTION, POWDER, LYOPHILIZED, FOR SOLUTION INTRAVENOUS at 09:10

## 2021-12-16 RX ADMIN — PIPERACILLIN SODIUM AND TAZOBACTAM SODIUM 3.38 G: 3; .375 INJECTION, POWDER, LYOPHILIZED, FOR SOLUTION INTRAVENOUS at 17:59

## 2021-12-16 RX ADMIN — Medication 10 ML: at 06:40

## 2021-12-16 RX ADMIN — THIAMINE HCL TAB 100 MG 100 MG: 100 TAB at 09:10

## 2021-12-16 RX ADMIN — BUDESONIDE AND FORMOTEROL FUMARATE DIHYDRATE 2 PUFF: 160; 4.5 AEROSOL RESPIRATORY (INHALATION) at 21:49

## 2021-12-16 RX ADMIN — Medication 10 ML: at 18:01

## 2021-12-16 RX ADMIN — SODIUM CHLORIDE 100 ML/HR: 9 INJECTION, SOLUTION INTRAVENOUS at 01:50

## 2021-12-16 RX ADMIN — ALBUTEROL SULFATE 2 PUFF: 108 INHALANT RESPIRATORY (INHALATION) at 21:48

## 2021-12-16 RX ADMIN — OXYCODONE HYDROCHLORIDE 5 MG: 5 TABLET ORAL at 16:00

## 2021-12-16 RX ADMIN — ALBUTEROL SULFATE 2 PUFF: 108 INHALANT RESPIRATORY (INHALATION) at 13:36

## 2021-12-16 RX ADMIN — ACETAMINOPHEN 650 MG: 325 TABLET ORAL at 16:00

## 2021-12-16 RX ADMIN — BUDESONIDE AND FORMOTEROL FUMARATE DIHYDRATE 2 PUFF: 160; 4.5 AEROSOL RESPIRATORY (INHALATION) at 08:37

## 2021-12-16 RX ADMIN — AMLODIPINE BESYLATE 5 MG: 5 TABLET ORAL at 09:10

## 2021-12-16 RX ADMIN — ENOXAPARIN SODIUM 40 MG: 100 INJECTION SUBCUTANEOUS at 09:10

## 2021-12-16 NOTE — PROGRESS NOTES
Hospitalist Progress Note               Daily Progress Note: 12/16/2021      Subjective: The patient is seen for follow up. He is a 53-year-old male with history of hypertension and asthma who was transferred from [facility] after he was presented there with shortness of breath and productive cough x2 weeks as well as right-sided pleuritic chest pain. Labs showed a white count of 34,000 and mild lactic acidosis. CT of the chest showed a large multiloculated right pleural effusion as well as a left apical nodule and tree-in-bud centrilobular nodules throughout the left lower lobe. Patient was hypoxic and requiring 4 L nasal cannula. Patient is a chronic smoker     He was started on Zosyn and vancomycin     -------   Today, patient seen at bedside, resting in no distress. Due to low chest tube drainage, catheter flushing every 6 hour requested to nursing. Drainage changed from claudia pus to serosanguinous drainage. Cardiovascular surgery recommeneded decortication for recovery. Stay on IV antibiotics with chest tube until decortication on Dec 17 or later. Patient communicated understanding of needing surgery after Friday, probably next Tuesday. Denies other complaints at this time. He denies shortness of breath, chest pain, headache, dizziness. WBC elevated back up at 17,800, platelet continues to rise at 583,000. Patient waiting for decortication next week. Vitals stable. Will continue to monitor.     Problem List:  Problem List as of 12/16/2021 Never Reviewed          Codes Class Noted - Resolved    Pleural effusion ICD-10-CM: J90  ICD-9-CM: 511.9  12/8/2021 - Present              Medications reviewed  Current Facility-Administered Medications   Medication Dose Route Frequency    oxyCODONE IR (ROXICODONE) tablet 5 mg  5 mg Oral Q4H PRN    albuterol (PROVENTIL HFA, VENTOLIN HFA, PROAIR HFA) inhaler 2 Puff  2 Puff Inhalation Q6HWA RT    budesonide-formoteroL (SYMBICORT) 160-4.5 mcg/actuation HFA inhaler 2 Puff  2 Puff Inhalation BID RT    amLODIPine (NORVASC) tablet 5 mg  5 mg Oral DAILY    sodium chloride (NS) flush 5-40 mL  5-40 mL IntraVENous Q8H    sodium chloride (NS) flush 5-40 mL  5-40 mL IntraVENous PRN    acetaminophen (TYLENOL) tablet 650 mg  650 mg Oral Q6H PRN    Or    acetaminophen (TYLENOL) suppository 650 mg  650 mg Rectal Q6H PRN    polyethylene glycol (MIRALAX) packet 17 g  17 g Oral DAILY PRN    ondansetron (ZOFRAN ODT) tablet 4 mg  4 mg Oral Q8H PRN    Or    ondansetron (ZOFRAN) injection 4 mg  4 mg IntraVENous Q6H PRN    enoxaparin (LOVENOX) injection 40 mg  40 mg SubCUTAneous DAILY    piperacillin-tazobactam (ZOSYN) 3.375 g in 0.9% sodium chloride (MBP/ADV) 100 mL MBP  3.375 g IntraVENous Q8H    influenza vaccine  (6 mos+)(PF) (FLUARIX/FLULAVAL/FLUZONE QUAD) injection 0.5 mL  1 Each IntraMUSCular PRIOR TO DISCHARGE    thiamine mononitrate (B-1) tablet 100 mg  100 mg Oral DAILY    0.9% sodium chloride infusion 10 mL  10 mL Irrigation CONTINUOUS       Review of Systems:   A comprehensive review of systems was negative except for that written in the HPI. Objective:   Physical Exam:     Visit Vitals  /75 (BP 1 Location: Left upper arm, BP Patient Position: At rest;Supine)   Pulse 83   Temp 98.1 °F (36.7 °C)   Resp 18   Ht 5' 11\" (1.803 m)   Wt 74.5 kg (164 lb 3.9 oz)   SpO2 96%   BMI 22.91 kg/m²    O2 Flow Rate (L/min): 1.5 l/min O2 Device: None (Room air)    Temp (24hrs), Av.7 °F (37.1 °C), Min:98.1 °F (36.7 °C), Max:99 °F (37.2 °C)    No intake/output data recorded.  1901 -  0700  In: -   Out: 2972 [FCKIA:2975; Drains:90]    General:   Awake and alert   Lungs:    Decreased breath sounds right lung field   Chest wall:  No tenderness or deformity. Heart:  Regular rate and rhythm, S1, S2 normal, no murmur, click, rub or gallop. Abdomen:   Soft, non-tender. Bowel sounds normal. No masses,  No organomegaly.    Extremities: Extremities normal, atraumatic, no cyanosis or edema. Pulses: 2+ and symmetric all extremities. Skin: Skin color, texture, turgor normal. No rashes or lesions   Neurologic: CNII-XII intact. No gross focal deficits         Data Review:       Recent Days:  Recent Labs     12/15/21  0906 12/14/21  0732   WBC 17.8* 15.3*   HGB 12.3 12.5   HCT 35.6* 36.9   * 531*     Recent Labs     12/15/21  0906 12/14/21  0732    139   K 3.7 3.8    106   CO2 26 27   GLU 89 96   BUN 6 5*   CREA 0.46* 0.42*   CA 8.3* 8.0*   PHOS  --  2.4*   ALB  --  1.3*     No results for input(s): PH, PCO2, PO2, HCO3, FIO2 in the last 72 hours. 24 Hour Results:  No results found for this or any previous visit (from the past 24 hour(s)). XR CHEST PORT   Final Result   1. There is residual airspace disease versus atelectasis within the right mid   to lower lung zones without short-term interval changes. 2.  There has been partial evacuation of a  right-sided empyema with moderate   residual areas of loculated pleural fluid and gas without short-term interval   changes. 3.  On recent CT imaging from 12/8/2021, there was demonstrated a massive right   pleural effusion as well as tree-in-bud nodular densities within the left lung. These findings can be associated with tuberculosis and nontuberculous   mycobacterial infections. Please correlate clinically. XR CHEST PORT   Final Result      XR CHEST PORT   Final Result      XR CHEST PORT   Final Result      XR CHEST PORT   Final Result      IR THORACENTESIS/INSERT CHEST TUBE   Final Result   Ultrasound of the right upper back was performed with images stored in PACS,   demonstrating heterogeneous collection most consistent with emphysema. Successful US-guided 8.5 percutaneous drainage catheter placement into right   empyema. Plan:   Continue flushing the catheter using 10 ml sterile normal saline and record the   output at least once a day.    Consider catheter removal if there is less than 20 mL output over 24 hours. CT CHEST WO CONT    (Results Pending)        Assessment:  Large right marked multiloculated pleural effusion,  due parapneumonic/empyema    Bilateral community-acquired pneumonia    Sepsis due to above with fever, leukocytosis, elevated procalcitonin and tachycardia    Acute respiratory failure with hypoxia    Tobacco abuse    Alcohol abuse      Plan:  Continue supportive care including IV Zosyn  Follow-up thoracic surgery recommendations  Increase pain medication frequency to every 4 hours  Anticipate decortication next Tuesday  Case discussed with Dr. Yojana Soto discussed with: Patient/Family and Dr. Shakila Duque    Disposition: Continued inpatient care    Total time spent with patient: 30 minutes.     Thi Flynn MD

## 2021-12-16 NOTE — PROGRESS NOTES
Problem: Falls - Risk of  Goal: *Absence of Falls  Description: Document Lorri Guevara Fall Risk and appropriate interventions in the flowsheet. 12/16/2021 0426 by Robert Naik RN  Outcome: Progressing Towards Goal  Note: Fall Risk Interventions:  Mobility Interventions: Patient to call before getting OOB         Medication Interventions: Teach patient to arise slowly,Patient to call before getting OOB    Elimination Interventions: Call light in reach           12/16/2021 0425 by Robert Naik RN  Outcome: Progressing Towards Goal  Note: Fall Risk Interventions:  Mobility Interventions: Patient to call before getting OOB         Medication Interventions: Teach patient to arise slowly,Patient to call before getting OOB    Elimination Interventions: Call light in reach              Problem: Pain  Goal: *PALLIATIVE CARE:  Alleviation of Pain  12/16/2021 0426 by Robert Naik RN  Outcome: Progressing Towards Goal  12/16/2021 0425 by Robert Naik RN  Outcome: Progressing Towards Goal     Problem: Pain  Goal: *Control of Pain  12/16/2021 0426 by Robert Naik RN  Outcome: Progressing Towards Goal  Note: Assess pain characteristics. Assess pain management. Identify pain expectations. Identify pain medication concerns. Monitor for change in patient condition. Assess for medication side-effects/   12/16/2021 0425 by Robert Naik RN  Note: Assess pain characteristics. Identify pain expectations. Identify pain medication concerns. Monitor for change in patient condition.

## 2021-12-16 NOTE — PROGRESS NOTES
Pulmonary, Critical Care    Name: Chani Newby MRN: [id number]   : 1970 Hospital: [address] [place] [address]   Date: 2021  Admission date: 2021 Hospital Day: 9       Subjective/Interval History:   Seen on the medical floor awake alert. History is 2 or so weeks of breathing difficulty. Started with severe chest pain pleuritic with chills and sweats he did not take his temperature but felt hot at times. He has had continued worsening presented to the emergency room has a large right pleural effusion. CT scan suggests multiloculated effusion. Leukocytosis up to 34,000 all consistent with empyema. He is a drinker of 1-2 sixpacks a day. Does drink to the point of falling asleep although he states he is never passed out. 12/10 percutaneous drainage catheter placed by IR yesterday with 1600 cc drainage overnight and so far today 700  12/ chest catheter continues to drain he states he feels a little better. Chest x-ray today has small apical pneumothorax with significant amount of remaining pleural fluid   awake alert states he feels better   no specific complaints respirations  12/15    Hospital Problems  Never Reviewed          Codes Class Noted POA    Pleural effusion ICD-10-CM: J90  ICD-9-CM: 511.9  2021 Unknown              IMPRESSION:   1. Right empyema culture  microaerophilic Streptococcus remains on Zosyn  2. Aspiration pneumonia  3. Chest tube in place  4. Nodular density left lung probably aspiration pneumonia have to follow to make sure it clears  5. Hyponatremia improved  6. Alcohol abuse  7. COPD exacerbation  Body mass index is 22.91 kg/m². RECOMMENDATIONS/PLAN:   1. Only 100 cc chest tube drainage recorded overnight. Will ask nursing to flush the catheter every 6 hour  2. No wheezing  bronchodilators now inhalers  3. Continue IV Zosyn WBC count continues to improve  4. No evidence of developing DTs  5. Continue thiamine  6.  Smoking cessation counseling done  7. We will get QuantiFERON test and ID evaluation           [x] High complexity decision making was performed  [x] See my orders for details      Subjective/Initial History:     I was asked by Nicole Franco MD to see Nahomy Cornell  a 46 y.o.  male in consultation for a chief complaint of multiloculated right pleural effusion likely empyema      No Known Allergies     MAR reviewed and pertinent medications noted or modified as needed     Current Facility-Administered Medications   Medication    oxyCODONE IR (ROXICODONE) tablet 5 mg    albuterol (PROVENTIL HFA, VENTOLIN HFA, PROAIR HFA) inhaler 2 Puff    budesonide-formoteroL (SYMBICORT) 160-4.5 mcg/actuation HFA inhaler 2 Puff    amLODIPine (NORVASC) tablet 5 mg    sodium chloride (NS) flush 5-40 mL    sodium chloride (NS) flush 5-40 mL    acetaminophen (TYLENOL) tablet 650 mg    Or    acetaminophen (TYLENOL) suppository 650 mg    polyethylene glycol (MIRALAX) packet 17 g    ondansetron (ZOFRAN ODT) tablet 4 mg    Or    ondansetron (ZOFRAN) injection 4 mg    enoxaparin (LOVENOX) injection 40 mg    piperacillin-tazobactam (ZOSYN) 3.375 g in 0.9% sodium chloride (MBP/ADV) 100 mL MBP    influenza vaccine 2021-22 (6 mos+)(PF) (FLUARIX/FLULAVAL/FLUZONE QUAD) injection 0.5 mL    thiamine mononitrate (B-1) tablet 100 mg    0.9% sodium chloride infusion 10 mL      Patient PCP: None  PMH:  has a past medical history of Asthma and Ill-defined condition. PSH:   has a past surgical history that includes ir thoracentesis/insert chest tube (12/9/2021). FHX: family history is not on file. SHX:  reports that he has quit smoking. He has never used smokeless tobacco. He reports current alcohol use. He reports previous drug use. To me he admits smoking 1 to 2 packs/day ever since he was 8 or 10.   He drinks 1-2 sixpacks a day beer     Systemic review:  General states his weight stable has been having chills and fever for the last 2 weeks or more  Eyes no double vision or momentary blindness  ENT no drainage or facial pain  Musculoskeletal no swollen tender joints  Endocrinologic no polyuria polydipsia  Neurologic no seizures or syncope  Gastrointestinal no nausea vomiting acid indigestion. Genitourinary no pain or discomfort on urination  Cardiovascular no history of heart disease or ankle edema he has had the pleuritic chest pain no diaphoresis  Respiratory as mentioned cough some yellow sputum fever chills shortness of breath and pleuritic pain on the right    Objective:     Vital Signs: Telemetry:    normal sinus rhythm Intake/Output:   Visit Vitals  /75 (BP 1 Location: Left upper arm, BP Patient Position: At rest;Supine)   Pulse 83   Temp 98.1 °F (36.7 °C)   Resp 18   Ht 5' 11\" (1.803 m)   Wt 74.5 kg (164 lb 3.9 oz)   SpO2 96%   BMI 22.91 kg/m²       Temp (24hrs), Av.7 °F (37.1 °C), Min:98.1 °F (36.7 °C), Max:99 °F (37.2 °C)        O2 Device: None (Room air) O2 Flow Rate (L/min): 1.5 l/min       Wt Readings from Last 4 Encounters:   12/15/21 74.5 kg (164 lb 3.9 oz)   21 73.5 kg (162 lb)          Intake/Output Summary (Last 24 hours) at 2021 1017  Last data filed at 2021 0645  Gross per 24 hour   Intake    Output 3765 ml   Net -3765 ml       Last shift:      No intake/output data recorded. Last 3 shifts:  1901 -  0700  In: -   Out: 0315 [YFBAB:1617; Drains:90]       Physical Exam:   General:  male; lying in bed no distress no accessory muscle usage  HEENT: NCAT, poor dentition,  Eyes: anicteric; conjunctiva clear extraocular movements intact  Neck: no nodes, no JVD, no accessory MM use. Chest: no deformity,   Cardiac: Regular rate and rhythm  Lungs: Markedly improved breath sounds on the right present anteriorly and upper lung posteriorly and developing some in the right base now, left side fairly clear  Abd: Thin soft positive bowel sounds  Ext: no edema; no joint swelling;  No clubbing  : clear urine  Neuro: Awake alert calm speech is clear moves all 4 extremities  Psych- no agitation, oriented to person;   Skin: warm, dry, no cyanosis;   Pulses: Brachial radial pulses intact  Capillary: Normal capillary refill    Labs:    Recent Labs     12/15/21  0906 12/14/21  0732   WBC 17.8* 15.3*   HGB 12.3 12.5   * 531*     Recent Labs     12/15/21  0906 12/14/21  0732    139   K 3.7 3.8    106   CO2 26 27   GLU 89 96   BUN 6 5*   CREA 0.46* 0.42*   CA 8.3* 8.0*   PHOS  --  2.4*   ALB  --  1.3*   . Pleural fluid WBC count 47,840 with 56% polys 32% lymphs pleural fluid protein 0.8 glucose 4  Pleural culture with  microaerophilic Streptococcus    Lab Results   Component Value Date/Time    Culture result: Heavy Streptococcus anginosus 12/09/2021 11:30 AM    Culture result: No growth 6 days 12/09/2021 07:42 AM    Culture result: No growth 6 days 12/08/2021 08:45 PM     Imaging:    CXR Results  (Last 48 hours)               12/12/21 0854  XR CHEST PORT Final result    Narrative:  Chest single view. Comparison single view chest December 11, 2021. Similar appearance for the right hemithorax. Predominant right basilar airspace opacity, similar volume. Unchanged hydropneumothorax. Similar degree apical pleural separation. Left lung aerated. Cardiac and mediastinal structures unchanged. To me the x-ray is improved you can start to see the right hemidiaphragm now implying further decrease in effusion in the apical pneumothorax is minimally improved       12/11/21 0752  XR CHEST PORT Final result    Narrative:  Chest single view. Comparison single view chest December 8, 2021. Right-sided hydropneumothorax. Estimated moderate volume pleural fluid. 1.1 cm   apical pleural separation. Left lung aerated. Cardiac and mediastinal structures   unchanged.            Results from [place] encounter on 12/08/21    XR CHEST PORT    Narrative  Chest single view. Comparison single view chest December 13, 2021. Unchanged appearance right hemithorax. Similar hydropneumothorax. 2.1 cm apical pleural separation. Same patchy reticular markings RML/RLL lung. Left lung aerated. Cardiac and mediastinal structures unchanged. XR CHEST PORT    Narrative  Reason for Visit:  HISTORY: Empyema. TECHNIQUE: Portable radiograph the chest dated 12/16/2021 obtained at 7:52 AM.  COMPARISON: Portable radiograph the chest dated 12/15/2021 obtained at 1:46 PM.  LIMITATIONS: None. TUBES/LINES:  There is a small to medium caliber pigtail drainage catheter  within the right diaphragmatic pleura. HEART AND PULMONARY VESSELS: Normal.    LUNG PARENCHYMA: There is airspace disease versus atelectasis within the right  mid to lower lung zones without short-term interval change. PLEURA: There is a moderate volume of loculated fluid and air demonstrated  within the right apical, lateral, and diaphragmatic pleura. The regions of  residual pleural fluid and pleural gas are unchanged. MEDIASTINUM: Normal.    BONE/SOFT TISSUES: This examination is negative for acute osseous abnormalities. Impression  1. There is residual airspace disease versus atelectasis within the right mid  to lower lung zones without short-term interval changes. 2.  There has been partial evacuation of a  right-sided empyema with moderate  residual areas of loculated pleural fluid and gas without short-term interval  changes. 3.  On recent CT imaging from 12/8/2021, there was demonstrated a massive right  pleural effusion as well as tree-in-bud nodular densities within the left lung. These findings can be associated with tuberculosis and nontuberculous  mycobacterial infections. Please correlate clinically. XR CHEST PORT    Narrative  Chest single view    Comparison single view chest 12/12/2021. Similar appearance right hemithorax. Predominant right basilar opacity.  Similar  known empyema. Apical air component with 2.1 cm pleural separation. Left lung aerated. Cardiac and mediastinal structures unchanged. Results from [place] encounter on 12/08/21    CTA CHEST W OR W WO CONT    Narrative  Chest CTA    TECHNIQUE: Multiple continuous axial images were obtained from the thoracic  inlet to the upper abdomen after the uneventful administration of intravenous  contrast. Reformatted images as well as maximum intensity projection images were  obtained in the sagittal and coronal planes. Comment on dose reduction: All CT scans at this facility are performed using  dose reduction optimization technique as appropriate to perform the exam  including the following; automated exposure control, adjustments of the mA  and/or kV according to patient size, or use of iterative reconstructed  technique. Comparison examination: Chest radiograph dated same day    Findings:  LYMPHADENOPATHY: There is no axillary, mediastinal, or hilar lymphadenopathy by  CT size criteria. CARDIOVASCULAR: Heart normal in size. Thoracic aorta normal in caliber. LUNGS AND PLEURA: Large multiloculated right pleural effusion with subsequent  near complete atelectasis of the right lung. Tree-in-bud centrilobular nodules  present throughout the left lower lobe with larger nodular airspace opacity  apical posterior segment left upper lobe. Findings consistent with  bronchiolitis/bronchopneumonia. The larger airspace nodule in the apical  posterior segment left upper lobe require surveillance. INCLUDED ABDOMEN: The visualized upper abdomen images normally. CHEST WALL: Degenerative changes are present throughout the thoracic spine. No  suspicious lytic or blastic lesion is identified. Impression  Large multiloculated right pleural effusion with subsequent near complete  atelectasis of the right lung. Therapeutic and diagnostic thoracentesis  recommended.     Tree-in-bud centrilobular nodules present throughout the left lower lobe with  larger nodular airspace opacity apical posterior segment left upper lobe. Findings consistent with bronchiolitis/bronchopneumonia. The larger airspace  nodule in the apical posterior segment left upper lobe require surveillance. · 12/9 discussion 2-week history of pleuritic pain cough chills sweats fever and a drinker who will drink to the point of falling asleep most likely has aspiration pneumonia with empyema. IR has been consulted for joellen peters. He very likely will have multiple loculations preventing drainage and may need VATS  · 12/11 feels fine respirations nonlabored has just an occasional air bubble when he coughs chest x-ray with small apical pneumothorax significant fluid remains although it is certainly improved culture consistent with oral portillo to remain on Zosyn WBC count improved we will discontinue vancomycin  · 12/12 doing well at this point no air leak seen on chest tube and chest x-ray is mildly improved. Continue current treatment  · 12/13 drainage has decreased from the chest catheter. Will flush with saline and follow drainage.   Chest x-ray is improved diaphragm can be seen on the right with infiltrate in that area likely representing pneumonia cannot tell how much fluid remains  · 12/15 chest x-ray today    Garth Castro MD

## 2021-12-16 NOTE — CONSULTS
Consult Date: 12/16/2021    IP CONSULT TO INFECTIOUS DISEASES  Consult performed by: Mark Raphael MD  Consult ordered by: Nicolas King MD      5W with history of hypertension, bronchitis, asthma. Long history of smoking. He reports prior vaccinations against coronavirus. 12/8/21 admitted to [facility] from [facility] after he was found to have a pleural effusion. He presents with two weeks of productive cough, shortness of breath, fevers, chills, generalized weakness. During the ED course underwent CT imaging which revealed large multiloculated right pleural effusion. 12/8/21 CT Chest  IMPRESSION  Large multiloculated right pleural effusion with subsequent near complete  atelectasis of the right lung. Therapeutic and diagnostic thoracentesis  recommended. Tree-in-bud centrilobular nodules present throughout the left lower lobe with  larger nodular airspace opacity apical posterior segment left upper lobe. Findings consistent with bronchiolitis/bronchopneumonia. The larger airspace  nodule in the apical posterior segment left upper lobe require surveillance. 12/9/21 underwent IR thoracentesis  IMPRESSION  Ultrasound of the right upper back was performed with images stored in PACS,  demonstrating heterogeneous collection most consistent with emphysema. Successful US-guided 8.5 percutaneous drainage catheter placement into right  empyema. Pleural cultures reveal Streptococcus anginosus and I have been asked to see him in consultation. Subjective     Past Medical History:   Diagnosis Date    Asthma     Ill-defined condition       Past Surgical History:   Procedure Laterality Date    IR THORACENTESIS/INSERT CHEST TUBE  12/9/2021     History reviewed. No pertinent family history.    Social History     Tobacco Use    Smoking status: Former Smoker    Smokeless tobacco: Never Used   Substance Use Topics    Alcohol use: Yes     Comment: daily drinker        Current Facility-Administered Medications Medication Dose Route Frequency Provider Last Rate Last Admin    oxyCODONE IR (ROXICODONE) tablet 5 mg  5 mg Oral Q4H PRN Ching Kauffman MD   5 mg at 12/16/21 1600    albuterol (PROVENTIL HFA, VENTOLIN HFA, PROAIR HFA) inhaler 2 Puff  2 Puff Inhalation Q6HWA RT Dayana Hamilton MD   2 Puff at 12/16/21 1336    budesonide-formoteroL (SYMBICORT) 160-4.5 mcg/actuation HFA inhaler 2 Puff  2 Puff Inhalation BID RT Dayana Hamilton MD   2 Puff at 12/16/21 0837    amLODIPine (NORVASC) tablet 5 mg  5 mg Oral DAILY Toñito Cornejo MD   5 mg at 12/16/21 0910    sodium chloride (NS) flush 5-40 mL  5-40 mL IntraVENous Q8H Toñito Cornejo MD   10 mL at 12/16/21 0640    sodium chloride (NS) flush 5-40 mL  5-40 mL IntraVENous PRN Heron Real MD        acetaminophen (TYLENOL) tablet 650 mg  650 mg Oral Q6H PRN Heron Real MD   650 mg at 12/16/21 1600    Or    acetaminophen (TYLENOL) suppository 650 mg  650 mg Rectal Q6H PRN Toñito Cornejo MD        polyethylene glycol (MIRALAX) packet 17 g  17 g Oral DAILY PRN Toñito Cornejo MD        ondansetron (ZOFRAN ODT) tablet 4 mg  4 mg Oral Q8H PRN Toñito Cornejo MD        Or    ondansetron (ZOFRAN) injection 4 mg  4 mg IntraVENous Q6H PRN Toñito Cornejo MD        enoxaparin (LOVENOX) injection 40 mg  40 mg SubCUTAneous DAILY Toñito Cornejo MD   40 mg at 12/16/21 0910    piperacillin-tazobactam (ZOSYN) 3.375 g in 0.9% sodium chloride (MBP/ADV) 100 mL MBP  3.375 g IntraVENous Q8H Toñito Cornejo MD 25 mL/hr at 12/16/21 0910 3.375 g at 12/16/21 0910    influenza vaccine 2021-22 (6 mos+)(PF) (FLUARIX/FLULAVAL/FLUZONE QUAD) injection 0.5 mL  1 Each IntraMUSCular PRIOR TO DISCHARGE Toñito Cornejo MD        thiamine mononitrate (B-1) tablet 100 mg  100 mg Oral DAILY Dayana Hamilton MD   100 mg at 12/16/21 0910    0.9% sodium chloride infusion 10 mL  10 mL Irrigation CONTINUOUS Muna Turner MD   10 mL at 12/10/21 1030        Review of Systems   All other systems reviewed and are negative. Objective     Vital signs for last 24 hours:  Visit Vitals  /70 (BP 1 Location: Left upper arm, BP Patient Position: At rest;Supine)   Pulse 92   Temp (!) 101.1 °F (38.4 °C)   Resp 18   Ht 5' 11\" (1.803 m)   Wt 74.5 kg (164 lb 3.9 oz)   SpO2 96%   BMI 22.91 kg/m²       Intake/Output this shift:  Current Shift: No intake/output data recorded. Last 3 Shifts: 12/14 1901 - 12/16 0700  In: -   Out: 1882 [MWCIJ:8638; Drains:90]    Data Review:   No results found for this or any previous visit (from the past 24 hour(s)). Physical Exam  Vitals and nursing note reviewed. HENT:      Head: Normocephalic and atraumatic. Nose: Nose normal.      Mouth/Throat:      Mouth: Mucous membranes are moist.   Eyes:      Extraocular Movements: Extraocular movements intact. Pupils: Pupils are equal, round, and reactive to light. Cardiovascular:      Rate and Rhythm: Normal rate and regular rhythm. Pulmonary:      Effort: Pulmonary effort is normal.      Comments: Decreased breath sounds right base  Abdominal:      General: Abdomen is flat. Palpations: Abdomen is soft. Musculoskeletal:         General: Normal range of motion. Cervical back: Normal range of motion and neck supple. Skin:     General: Skin is dry. Neurological:      Mental Status: He is alert. Psychiatric:         Mood and Affect: Mood normal.     51M with history of hypertension, bronchitis, asthma. Long history of smoking. He reports prior vaccinations against coronavirus. 12/8/21 admitted to [facility] from [facility] after he was found to have a pleural effusion. He presents with two weeks of productive cough, shortness of breath, fevers, chills, generalized weakness. During the ED course underwent CT imaging which revealed large multiloculated right pleural effusion. 12/8/21 CT Chest  IMPRESSION  Large multiloculated right pleural effusion with subsequent near complete  atelectasis of the right lung. Therapeutic and diagnostic thoracentesis  recommended. Tree-in-bud centrilobular nodules present throughout the left lower lobe with  larger nodular airspace opacity apical posterior segment left upper lobe. Findings consistent with bronchiolitis/bronchopneumonia. The larger airspace  nodule in the apical posterior segment left upper lobe require surveillance. 12/9/21 underwent IR thoracentesis  IMPRESSION  Ultrasound of the right upper back was performed with images stored in PACS,  demonstrating heterogeneous collection most consistent with emphysema. Successful US-guided 8.5 percutaneous drainage catheter placement into right  empyema. Pleural cultures reveal Streptococcus anginosus and I have been asked to see him in consultation. MICROBIOLOGY    12/8/21 Covid19 Negative  12/8/21 Blood  Negative  12/9/21 Blood  Negative    12/9/21 IR pleural Heavy Streptococcus anginosus    12/16/21 Quant TB Pending    ASSESSMENT AND RECOMMENDATIONS    1) Complex loculated pleural effusion in the setting of advanced aspiration pneumonia.      If TB quantiferon is negative:     Zosyn iv while in hospital   Once stable for discharge, oral Augmentin to complete outpatient course    Augmentin 875mg po bid through 1/7/22

## 2021-12-17 ENCOUNTER — APPOINTMENT (OUTPATIENT)
Dept: CT IMAGING | Age: 51
DRG: 853 | End: 2021-12-17
Attending: THORACIC SURGERY (CARDIOTHORACIC VASCULAR SURGERY)
Payer: COMMERCIAL

## 2021-12-17 LAB
BASOPHILS # BLD: 0.1 K/UL (ref 0–0.1)
BASOPHILS NFR BLD: 0 % (ref 0–1)
DIFFERENTIAL METHOD BLD: ABNORMAL
EOSINOPHIL # BLD: 0.1 K/UL (ref 0–0.4)
EOSINOPHIL NFR BLD: 1 % (ref 0–7)
ERYTHROCYTE [DISTWIDTH] IN BLOOD BY AUTOMATED COUNT: 12.8 % (ref 11.5–14.5)
HCT VFR BLD AUTO: 36.4 % (ref 36.6–50.3)
HGB BLD-MCNC: 12.2 G/DL (ref 12.1–17)
IMM GRANULOCYTES # BLD AUTO: 0.1 K/UL (ref 0–0.04)
IMM GRANULOCYTES NFR BLD AUTO: 1 % (ref 0–0.5)
LYMPHOCYTES # BLD: 1.7 K/UL (ref 0.8–3.5)
LYMPHOCYTES NFR BLD: 10 % (ref 12–49)
MCH RBC QN AUTO: 27.7 PG (ref 26–34)
MCHC RBC AUTO-ENTMCNC: 33.5 G/DL (ref 30–36.5)
MCV RBC AUTO: 82.7 FL (ref 80–99)
MONOCYTES # BLD: 0.8 K/UL (ref 0–1)
MONOCYTES NFR BLD: 5 % (ref 5–13)
NEUTS SEG # BLD: 13.8 K/UL (ref 1.8–8)
NEUTS SEG NFR BLD: 83 % (ref 32–75)
NRBC # BLD: 0 K/UL (ref 0–0.01)
NRBC BLD-RTO: 0 PER 100 WBC
PLATELET # BLD AUTO: 657 K/UL (ref 150–400)
PMV BLD AUTO: 9.5 FL (ref 8.9–12.9)
RBC # BLD AUTO: 4.4 M/UL (ref 4.1–5.7)
WBC # BLD AUTO: 16.6 K/UL (ref 4.1–11.1)

## 2021-12-17 PROCEDURE — 74011000258 HC RX REV CODE- 258: Performed by: RADIOLOGY

## 2021-12-17 PROCEDURE — 36415 COLL VENOUS BLD VENIPUNCTURE: CPT

## 2021-12-17 PROCEDURE — 74011250636 HC RX REV CODE- 250/636: Performed by: INTERNAL MEDICINE

## 2021-12-17 PROCEDURE — 71250 CT THORAX DX C-: CPT

## 2021-12-17 PROCEDURE — 74011250637 HC RX REV CODE- 250/637: Performed by: INTERNAL MEDICINE

## 2021-12-17 PROCEDURE — 65270000029 HC RM PRIVATE

## 2021-12-17 PROCEDURE — 94640 AIRWAY INHALATION TREATMENT: CPT

## 2021-12-17 PROCEDURE — 85025 COMPLETE CBC W/AUTO DIFF WBC: CPT

## 2021-12-17 PROCEDURE — 94760 N-INVAS EAR/PLS OXIMETRY 1: CPT

## 2021-12-17 PROCEDURE — 74011000258 HC RX REV CODE- 258: Performed by: INTERNAL MEDICINE

## 2021-12-17 RX ORDER — BUDESONIDE AND FORMOTEROL FUMARATE DIHYDRATE 160; 4.5 UG/1; UG/1
2 AEROSOL RESPIRATORY (INHALATION)
Status: DISCONTINUED | OUTPATIENT
Start: 2021-12-17 | End: 2021-12-27 | Stop reason: HOSPADM

## 2021-12-17 RX ORDER — ALBUTEROL SULFATE 90 UG/1
2 AEROSOL, METERED RESPIRATORY (INHALATION)
Status: DISCONTINUED | OUTPATIENT
Start: 2021-12-17 | End: 2021-12-27

## 2021-12-17 RX ADMIN — Medication 10 ML: at 06:49

## 2021-12-17 RX ADMIN — ENOXAPARIN SODIUM 40 MG: 100 INJECTION SUBCUTANEOUS at 09:07

## 2021-12-17 RX ADMIN — ACETAMINOPHEN 650 MG: 325 TABLET ORAL at 15:55

## 2021-12-17 RX ADMIN — PIPERACILLIN SODIUM AND TAZOBACTAM SODIUM 3.38 G: 3; .375 INJECTION, POWDER, LYOPHILIZED, FOR SOLUTION INTRAVENOUS at 18:39

## 2021-12-17 RX ADMIN — THIAMINE HCL TAB 100 MG 100 MG: 100 TAB at 09:07

## 2021-12-17 RX ADMIN — OXYCODONE HYDROCHLORIDE 5 MG: 5 TABLET ORAL at 15:48

## 2021-12-17 RX ADMIN — AMLODIPINE BESYLATE 5 MG: 5 TABLET ORAL at 09:07

## 2021-12-17 RX ADMIN — Medication 10 ML: at 21:32

## 2021-12-17 RX ADMIN — BUDESONIDE AND FORMOTEROL FUMARATE DIHYDRATE 2 PUFF: 160; 4.5 AEROSOL RESPIRATORY (INHALATION) at 07:53

## 2021-12-17 RX ADMIN — SODIUM CHLORIDE 10 ML: 900 INJECTION, SOLUTION INTRAVENOUS at 12:00

## 2021-12-17 RX ADMIN — Medication 10 ML: at 15:57

## 2021-12-17 RX ADMIN — PIPERACILLIN SODIUM AND TAZOBACTAM SODIUM 3.38 G: 3; .375 INJECTION, POWDER, LYOPHILIZED, FOR SOLUTION INTRAVENOUS at 02:27

## 2021-12-17 RX ADMIN — ALBUTEROL SULFATE 2 PUFF: 108 INHALANT RESPIRATORY (INHALATION) at 07:53

## 2021-12-17 RX ADMIN — Medication 10 ML: at 02:29

## 2021-12-17 RX ADMIN — PIPERACILLIN SODIUM AND TAZOBACTAM SODIUM 3.38 G: 3; .375 INJECTION, POWDER, LYOPHILIZED, FOR SOLUTION INTRAVENOUS at 09:07

## 2021-12-17 NOTE — PROGRESS NOTES
Problem: Falls - Risk of  Goal: *Absence of Falls  Description: Document Ceci Chiang Fall Risk and appropriate interventions in the flowsheet.   Outcome: Progressing Towards Goal  Note: Fall Risk Interventions:  Mobility Interventions: Patient to call before getting OOB         Medication Interventions: Teach patient to arise slowly    Elimination Interventions: Call light in reach,Patient to call for help with toileting needs,Toilet paper/wipes in reach,Urinal in reach              Problem: Patient Education: Go to Patient Education Activity  Goal: Patient/Family Education  Outcome: Progressing Towards Goal     Problem: Pain  Goal: *Control of Pain  Outcome: Progressing Towards Goal  Goal: *PALLIATIVE CARE:  Alleviation of Pain  Outcome: Progressing Towards Goal     Problem: Patient Education: Go to Patient Education Activity  Goal: Patient/Family Education  Outcome: Progressing Towards Goal

## 2021-12-17 NOTE — PROGRESS NOTES
Infectious Diseases Progress Note  Cory Walter MD  [phone]  Date:2021       Room:[facility]  Patient Name:Jason Beltran     YOB: 1970     Age:51 y.o. 51M with history of hypertension, bronchitis, asthma. Long history of smoking. He reports prior vaccinations against coronavirus.     21 admitted to [facility] from [facility] after he was found to have a pleural effusion. He presents with two weeks of productive cough, shortness of breath, fevers, chills, generalized weakness. During the ED course underwent CT imaging which revealed large multiloculated right pleural effusion.     21 CT Chest  IMPRESSION  Large multiloculated right pleural effusion with subsequent near complete  atelectasis of the right lung. Therapeutic and diagnostic thoracentesis  recommended. Tree-in-bud centrilobular nodules present throughout the left lower lobe with  larger nodular airspace opacity apical posterior segment left upper lobe. Findings consistent with bronchiolitis/bronchopneumonia. The larger airspace  nodule in the apical posterior segment left upper lobe require surveillance.     21 underwent IR thoracentesis  IMPRESSION  Ultrasound of the right upper back was performed with images stored in PACS,  demonstrating heterogeneous collection most consistent with emphysema. Successful US-guided 8.5 percutaneous drainage catheter placement into right  empyema.      Pleural cultures reveal Streptococcus anginosus and I have been asked to see him in consultation.     Subjective    Subjective:  Symptoms:  Stable. Review of Systems   All other systems reviewed and are negative.     Objective         Vitals Last 24 Hours:  TEMPERATURE:  Temp  Av.7 °F (37.6 °C)  Min: 98.7 °F (37.1 °C)  Max: 101.1 °F (38.4 °C)  RESPIRATIONS RANGE: Resp  Av.5  Min: 16  Max: 18  PULSE OXIMETRY RANGE: SpO2  Av.8 %  Min: 93 %  Max: 97 %  PULSE RANGE: Pulse  Av.3  Min: 79  Max: 92  BLOOD PRESSURE RANGE: Systolic (57LOI), MUH:957 , Min:105 , LWL:627   ; Diastolic (46KHI), ITW:85, Min:61, Max:76    I/O (24Hr): Intake/Output Summary (Last 24 hours) at 12/17/2021 1248  Last data filed at 12/17/2021 0907  Gross per 24 hour   Intake 900 ml   Output 2230 ml   Net -1330 ml     Objective:  General Appearance:  Comfortable. Vital signs: (most recent): Blood pressure 127/68, pulse 79, temperature 99 °F (37.2 °C), resp. rate 16, height 5' 11\" (1.803 m), weight 74.5 kg (164 lb 3.9 oz), SpO2 95 %. Vital signs are normal.    HEENT: Normal HEENT exam.    Lungs:  Normal effort and normal respiratory rate. Heart: Normal rate. Regular rhythm. Abdomen: Abdomen is soft. Bowel sounds are normal.     Extremities: Normal range of motion. Pulses: Distal pulses are intact. Neurological: Patient is alert. Pupils:  Pupils are equal, round, and reactive to light. Skin:  Warm and dry. Labs/Imaging/Diagnostics    Labs:  CBC:  Recent Labs     12/17/21  0806 12/15/21  0906   WBC 16.6* 17.8*   RBC 4.40 4.33   HGB 12.2 12.3   HCT 36.4* 35.6*   MCV 82.7 82.2   RDW 12.8 12.9   * 583*     CHEMISTRIES:  Recent Labs     12/15/21  0906      K 3.7      CO2 26   BUN 6   CA 8.3*   PT/INR:No results for input(s): INR, INREXT in the last 72 hours. No lab exists for component: PROTIME  APTT:No results for input(s): APTT in the last 72 hours. LIVER PROFILE:No results for input(s): AST, ALT in the last 72 hours. No lab exists for component: [place]us, ALKPHOS  Lab Results   Component Value Date/Time    ALT (SGPT) 56 12/08/2021 05:34 PM    AST (SGOT) 51 (H) 12/08/2021 05:34 PM    Alk. phosphatase 118 (H) 12/08/2021 05:34 PM    Bilirubin, total 1.0 12/08/2021 05:34 PM       Imaging Last 24 Hours:  No results found. Assessment//Plan   Active Problems:    Pleural effusion (12/8/2021)      Assessment & Plan   51M with history of hypertension, bronchitis, asthma. Long history of smoking.  He reports prior vaccinations against coronavirus.     12/8/21 [facility] after he was found to have a pleural effusion. He presents with two weeks of productive cough, shortness of breath, fevers, chills, generalized weakness. During the ED course underwent CT imaging which revealed large multiloculated right pleural effusion.     12/8/21 CT Chest  IMPRESSION  Large multiloculated right pleural effusion with subsequent near complete  atelectasis of the right lung. Therapeutic and diagnostic thoracentesis  recommended. Tree-in-bud centrilobular nodules present throughout the left lower lobe with  larger nodular airspace opacity apical posterior segment left upper lobe. Findings consistent with bronchiolitis/bronchopneumonia. The larger airspace  nodule in the apical posterior segment left upper lobe require surveillance.     12/9/21 underwent IR thoracentesis  IMPRESSION  Ultrasound of the right upper back was performed with images stored in PACS,  demonstrating heterogeneous collection most consistent with emphysema.    Successful US-guided 8.5 percutaneous drainage catheter placement into right  empyema.      Pleural cultures reveal Streptococcus anginosus and I have been asked to see him in consultation.     MICROBIOLOGY     12/8/21            Covid19           Negative  12/8/21            Blood               Negative  12/9/21            Blood               Negative     12/9/21            IR pleural         Heavy Streptococcus anginosus     12/16/21          Quant TB         Pending    12/16/21 Pleural  AFB pending      Fungal pending     ASSESSMENT AND RECOMMENDATIONS     1) Complex loculated pleural effusion in the setting of advanced aspiration pneumonia.                 If TB quantiferon is negative:                 Zosyn iv while in hospital              Once stable for discharge, oral Augmentin to complete outpatient course                          Augmentin 875mg po bid through 1/7/22       Electronically signed by Jitendra Connell MD on 12/17/2021 at 12:48 PM

## 2021-12-17 NOTE — PROGRESS NOTES
Pulmonary, Critical Care    Name: Liam Mancia MRN: [id number]   : 1970 Hospital: [facility]   Date: 2021  Admission date: 2021 Hospital Day: 10       Subjective/Interval History:   Seen on the medical floor awake alert. History is 2 or so weeks of breathing difficulty. Started with severe chest pain pleuritic with chills and sweats he did not take his temperature but felt hot at times. He has had continued worsening presented to the emergency room has a large right pleural effusion. CT scan suggests multiloculated effusion. Leukocytosis up to 34,000 all consistent with empyema. He is a drinker of 1-2 sixpacks a day. Does drink to the point of falling asleep although he states he is never passed out. 12/10 percutaneous drainage catheter placed by IR yesterday with 1600 cc drainage overnight and so far today 700   chest catheter continues to drain he states he feels a little better. Chest x-ray today has small apical pneumothorax with significant amount of remaining pleural fluid   awake alert states he feels better   no specific complaints respirations  12/15    Hospital Problems  Never Reviewed          Codes Class Noted POA    Pleural effusion ICD-10-CM: J90  ICD-9-CM: 511.9  2021 Unknown              IMPRESSION:   1. Right empyema culture  microaerophilic Streptococcus remains on Zosyn  2. Aspiration pneumonia  3. Chest tube in place  4. Nodular density left lung probably aspiration pneumonia have to follow to make sure it clears  5. Hyponatremia improved  6. Alcohol abuse  7. COPD exacerbation  Body mass index is 22.91 kg/m². RECOMMENDATIONS/PLAN:   1. Pleural fluid loculated positive for Streptococcus anguosus  2. Only 100 cc chest tube drainage recorded overnight. Will ask nursing to flush the catheter every 6 hour  3. No wheezing  bronchodilators now inhalers  4. Continue IV Zosyn WBC count continues to improve  5.  No evidence of developing DTs  6. Continue thiamine  7. Smoking cessation counseling done  8. QuantiFERON test pending           [x] High complexity decision making was performed  [x] See my orders for details      Subjective/Initial History:     I was asked by Yuki Hamm MD to see Cris Aguilar  a 46 y.o.  male in consultation for a chief complaint of multiloculated right pleural effusion likely empyema      No Known Allergies     MAR reviewed and pertinent medications noted or modified as needed     Current Facility-Administered Medications   Medication    oxyCODONE IR (ROXICODONE) tablet 5 mg    albuterol (PROVENTIL HFA, VENTOLIN HFA, PROAIR HFA) inhaler 2 Puff    budesonide-formoteroL (SYMBICORT) 160-4.5 mcg/actuation HFA inhaler 2 Puff    amLODIPine (NORVASC) tablet 5 mg    sodium chloride (NS) flush 5-40 mL    sodium chloride (NS) flush 5-40 mL    acetaminophen (TYLENOL) tablet 650 mg    Or    acetaminophen (TYLENOL) suppository 650 mg    polyethylene glycol (MIRALAX) packet 17 g    ondansetron (ZOFRAN ODT) tablet 4 mg    Or    ondansetron (ZOFRAN) injection 4 mg    enoxaparin (LOVENOX) injection 40 mg    piperacillin-tazobactam (ZOSYN) 3.375 g in 0.9% sodium chloride (MBP/ADV) 100 mL MBP    influenza vaccine 2021-22 (6 mos+)(PF) (FLUARIX/FLULAVAL/FLUZONE QUAD) injection 0.5 mL    thiamine mononitrate (B-1) tablet 100 mg    0.9% sodium chloride infusion 10 mL      Patient PCP: None  PMH:  has a past medical history of Asthma and Ill-defined condition. PSH:   has a past surgical history that includes ir thoracentesis/insert chest tube (12/9/2021). FHX: family history is not on file. SHX:  reports that he has quit smoking. He has never used smokeless tobacco. He reports current alcohol use. He reports previous drug use. To me he admits smoking 1 to 2 packs/day ever since he was 8 or 10.   He drinks 1-2 sixpacks a day beer     Systemic review:  General states his weight stable has been having chills and fever for the last 2 weeks or more  Eyes no double vision or momentary blindness  ENT no drainage or facial pain  Musculoskeletal no swollen tender joints  Endocrinologic no polyuria polydipsia  Neurologic no seizures or syncope  Gastrointestinal no nausea vomiting acid indigestion. Genitourinary no pain or discomfort on urination  Cardiovascular no history of heart disease or ankle edema he has had the pleuritic chest pain no diaphoresis  Respiratory as mentioned cough some yellow sputum fever chills shortness of breath and pleuritic pain on the right    Objective:     Vital Signs: Telemetry:    normal sinus rhythm Intake/Output:   Visit Vitals  /68 (BP 1 Location: Left upper arm)   Pulse 79   Temp 99 °F (37.2 °C)   Resp 16   Ht 5' 11\" (1.803 m)   Wt 74.5 kg (164 lb 3.9 oz)   SpO2 95% Comment: Simultaneous filing. User may not have seen previous data. BMI 22.91 kg/m²       Temp (24hrs), Av.7 °F (37.6 °C), Min:98.7 °F (37.1 °C), Max:101.1 °F (38.4 °C)        O2 Device: None (Room air) O2 Flow Rate (L/min): 1.5 l/min       Wt Readings from Last 4 Encounters:   12/15/21 74.5 kg (164 lb 3.9 oz)   21 73.5 kg (162 lb)          Intake/Output Summary (Last 24 hours) at 2021 0858  Last data filed at 2021 0649  Gross per 24 hour   Intake 800 ml   Output 2230 ml   Net -1430 ml       Last shift:      No intake/output data recorded. Last 3 shifts: 12/15 190 -  0700  In: 12 [P.O.:800]  Out: 36 [Urine:5000; Drains:70]       Physical Exam:   General:  male; lying in bed no distress no accessory muscle usage  HEENT: NCAT, poor dentition,  Eyes: anicteric; conjunctiva clear extraocular movements intact  Neck: no nodes, no JVD, no accessory MM use.   Chest: no deformity,   Cardiac: Regular rate and rhythm  Lungs: Markedly improved breath sounds on the right present anteriorly and upper lung posteriorly and developing some in the right base now, left side fairly clear  Abd: Thin soft positive bowel sounds  Ext: no edema; no joint swelling; No clubbing  : clear urine  Neuro: Awake alert calm speech is clear moves all 4 extremities  Psych- no agitation, oriented to person;   Skin: warm, dry, no cyanosis;   Pulses: Brachial radial pulses intact  Capillary: Normal capillary refill    Labs:    Recent Labs     12/15/21  0906   WBC 17.8*   HGB 12.3   *     Recent Labs     12/15/21  0906      K 3.7      CO2 26   GLU 89   BUN 6   CREA 0.46*   CA 8.3*   . Pleural fluid WBC count 47,840 with 56% polys 32% lymphs pleural fluid protein 0.8 glucose 4  Pleural culture with  microaerophilic Streptococcus    Lab Results   Component Value Date/Time    Culture result: Heavy Streptococcus anginosus 12/09/2021 11:30 AM    Culture result: No growth 6 days 12/09/2021 07:42 AM    Culture result: No growth 6 days 12/08/2021 08:45 PM     Imaging:    CXR Results  (Last 48 hours)               12/12/21 0854  XR CHEST PORT Final result    Narrative:  Chest single view. Comparison single view chest December 11, 2021. Similar appearance for the right hemithorax. Predominant right basilar airspace opacity, similar volume. Unchanged hydropneumothorax. Similar degree apical pleural separation. Left lung aerated. Cardiac and mediastinal structures unchanged. To me the x-ray is improved you can start to see the right hemidiaphragm now implying further decrease in effusion in the apical pneumothorax is minimally improved       12/11/21 0752  XR CHEST PORT Final result    Narrative:  Chest single view. Comparison single view chest December 8, 2021. Right-sided hydropneumothorax. Estimated moderate volume pleural fluid. 1.1 cm   apical pleural separation. Left lung aerated. Cardiac and mediastinal structures   unchanged. Results from [place] encounter on 12/08/21    XR CHEST PORT    Narrative  Chest single view.     Comparison single view chest December 13, 2021. Unchanged appearance right hemithorax. Similar hydropneumothorax. 2.1 cm apical pleural separation. Same patchy reticular markings RML/RLL lung. Left lung aerated. Cardiac and mediastinal structures unchanged. XR CHEST PORT    Narrative  Reason for Visit:  HISTORY: Empyema. TECHNIQUE: Portable radiograph the chest dated 12/16/2021 obtained at 7:52 AM.  COMPARISON: Portable radiograph the chest dated 12/15/2021 obtained at 1:46 PM.  LIMITATIONS: None. TUBES/LINES:  There is a small to medium caliber pigtail drainage catheter  within the right diaphragmatic pleura. HEART AND PULMONARY VESSELS: Normal.    LUNG PARENCHYMA: There is airspace disease versus atelectasis within the right  mid to lower lung zones without short-term interval change. PLEURA: There is a moderate volume of loculated fluid and air demonstrated  within the right apical, lateral, and diaphragmatic pleura. The regions of  residual pleural fluid and pleural gas are unchanged. MEDIASTINUM: Normal.    BONE/SOFT TISSUES: This examination is negative for acute osseous abnormalities. Impression  1. There is residual airspace disease versus atelectasis within the right mid  to lower lung zones without short-term interval changes. 2.  There has been partial evacuation of a  right-sided empyema with moderate  residual areas of loculated pleural fluid and gas without short-term interval  changes. 3.  On recent CT imaging from 12/8/2021, there was demonstrated a massive right  pleural effusion as well as tree-in-bud nodular densities within the left lung. These findings can be associated with tuberculosis and nontuberculous  mycobacterial infections. Please correlate clinically. XR CHEST PORT    Narrative  Chest single view    Comparison single view chest 12/12/2021. Similar appearance right hemithorax. Predominant right basilar opacity. Similar  known empyema.  Apical air component with 2.1 cm pleural separation. Left lung aerated. Cardiac and mediastinal structures unchanged. Results from [place] encounter on 12/08/21    CTA CHEST W OR W WO CONT    Narrative  Chest CTA    TECHNIQUE: Multiple continuous axial images were obtained from the thoracic  inlet to the upper abdomen after the uneventful administration of intravenous  contrast. Reformatted images as well as maximum intensity projection images were  obtained in the sagittal and coronal planes. Comment on dose reduction: All CT scans at this facility are performed using  dose reduction optimization technique as appropriate to perform the exam  including the following; automated exposure control, adjustments of the mA  and/or kV according to patient size, or use of iterative reconstructed  technique. Comparison examination: Chest radiograph dated same day    Findings:  LYMPHADENOPATHY: There is no axillary, mediastinal, or hilar lymphadenopathy by  CT size criteria. CARDIOVASCULAR: Heart normal in size. Thoracic aorta normal in caliber. LUNGS AND PLEURA: Large multiloculated right pleural effusion with subsequent  near complete atelectasis of the right lung. Tree-in-bud centrilobular nodules  present throughout the left lower lobe with larger nodular airspace opacity  apical posterior segment left upper lobe. Findings consistent with  bronchiolitis/bronchopneumonia. The larger airspace nodule in the apical  posterior segment left upper lobe require surveillance. INCLUDED ABDOMEN: The visualized upper abdomen images normally. CHEST WALL: Degenerative changes are present throughout the thoracic spine. No  suspicious lytic or blastic lesion is identified. Impression  Large multiloculated right pleural effusion with subsequent near complete  atelectasis of the right lung. Therapeutic and diagnostic thoracentesis  recommended.     Tree-in-bud centrilobular nodules present throughout the left lower lobe with  larger nodular airspace opacity apical posterior segment left upper lobe. Findings consistent with bronchiolitis/bronchopneumonia. The larger airspace  nodule in the apical posterior segment left upper lobe require surveillance. · 12/9 discussion 2-week history of pleuritic pain cough chills sweats fever and a drinker who will drink to the point of falling asleep most likely has aspiration pneumonia with empyema. IR has been consulted for joellen peters. He very likely will have multiple loculations preventing drainage and may need VATS  · 12/11 feels fine respirations nonlabored has just an occasional air bubble when he coughs chest x-ray with small apical pneumothorax significant fluid remains although it is certainly improved culture consistent with oral portillo to remain on Zosyn WBC count improved we will discontinue vancomycin  · 12/12 doing well at this point no air leak seen on chest tube and chest x-ray is mildly improved. Continue current treatment  · 12/13 drainage has decreased from the chest catheter. Will flush with saline and follow drainage.   Chest x-ray is improved diaphragm can be seen on the right with infiltrate in that area likely representing pneumonia cannot tell how much fluid remains  · 12/15 chest x-ray   · 12/17 on antibiotic no chest pain repeat chest x-ray shows infiltrate atelectasis right midlung and improvement in there right-sided pleural loculated effusion    Padmini Huff MD

## 2021-12-17 NOTE — PROGRESS NOTES
Hospitalist Progress Note               Daily Progress Note: 12/17/2021      Subjective: The patient is seen for follow up. He is a 28-year-old male with history of hypertension and asthma who was transferred from [place] [address] after he was presented there with shortness of breath and productive cough x2 weeks as well as right-sided pleuritic chest pain. Labs showed a white count of 34,000 and mild lactic acidosis. CT of the chest showed a large multiloculated right pleural effusion as well as a left apical nodule and tree-in-bud centrilobular nodules throughout the left lower lobe. Patient was hypoxic and requiring 4 L nasal cannula. Patient is a chronic smoker     He was started on Zosyn and vancomycin     -------   Today, patient seen at bedside, resting in no distress. Due to low chest tube drainage, catheter flushing every 6 hour requested to nursing. Drainage changed from claudia pus to serosanguinous drainage. Cardiovascular surgery recommeneded decortication for recovery. Stay on IV antibiotics with chest tube until decortication on Dec 17 or later. Patient communicated understanding of needing surgery after Friday, probably next Tuesday. Denies other complaints at this time. He denies shortness of breath, chest pain, headache, dizziness. [place]BC  [place] [address], platelet continues to rise at 657,000. Patient waiting for decortication next week. Vitals stable. Will continue to monitor.     Problem List:  Problem List as of 12/17/2021 Never Reviewed          Codes Class Noted - Resolved    Pleural effusion ICD-10-CM: J90  ICD-9-CM: 511.9  12/8/2021 - Present              Medications reviewed  Current Facility-Administered Medications   Medication Dose Route Frequency    albuterol (PROVENTIL HFA, VENTOLIN HFA, PROAIR HFA) inhaler 2 Puff  2 Puff Inhalation Q6H PRN    budesonide-formoteroL (SYMBICORT) 160-4.5 mcg/actuation HFA inhaler 2 Puff  2 Puff Inhalation BID PRN    oxyCODONE IR (ROXICODONE) tablet 5 mg  5 mg Oral Q4H PRN    amLODIPine (NORVASC) tablet 5 mg  5 mg Oral DAILY    sodium chloride (NS) flush 5-40 mL  5-40 mL IntraVENous Q8H    sodium chloride (NS) flush 5-40 mL  5-40 mL IntraVENous PRN    acetaminophen (TYLENOL) tablet 650 mg  650 mg Oral Q6H PRN    Or    acetaminophen (TYLENOL) suppository 650 mg  650 mg Rectal Q6H PRN    polyethylene glycol (MIRALAX) packet 17 g  17 g Oral DAILY PRN    ondansetron (ZOFRAN ODT) tablet 4 mg  4 mg Oral Q8H PRN    Or    ondansetron (ZOFRAN) injection 4 mg  4 mg IntraVENous Q6H PRN    enoxaparin (LOVENOX) injection 40 mg  40 mg SubCUTAneous DAILY    piperacillin-tazobactam (ZOSYN) 3.375 g in 0.9% sodium chloride (MBP/ADV) 100 mL MBP  3.375 g IntraVENous Q8H    influenza vaccine  (6 mos+)(PF) (FLUARIX/FLULAVAL/FLUZONE QUAD) injection 0.5 mL  1 Each IntraMUSCular PRIOR TO DISCHARGE    thiamine mononitrate (B-1) tablet 100 mg  100 mg Oral DAILY    0.9% sodium chloride infusion 10 mL  10 mL Irrigation CONTINUOUS       Review of Systems:   A comprehensive review of systems was negative except for that written in the HPI. Objective:   Physical Exam:     Visit Vitals  /68 (BP 1 Location: Left upper arm)   Pulse 79   Temp 99 °F (37.2 °C)   Resp 16   Ht 5' 11\" (1.803 m)   Wt 74.5 kg (164 lb 3.9 oz)   SpO2 95% Comment: Simultaneous filing. User may not have seen previous data. BMI 22.91 kg/m²    O2 Flow Rate (L/min): 1.5 l/min O2 Device: None (Room air)    Temp (24hrs), Av.7 °F (37.6 °C), Min:98.7 °F (37.1 °C), Max:101.1 °F (38.4 °C)     07 -  1900  In: 100 [I.V.:100]  Out: -    12/15 1901 -  0700  In: 12 [P.O.:800]  Out: 5833 [Urine:5000; Drains:70]    General:   Awake and alert   Lungs:    Decreased breath sounds right lung field   Chest wall:  No tenderness or deformity. Heart:  Regular rate and rhythm, S1, S2 normal, no murmur, click, rub or gallop. Abdomen:   Soft, non-tender.  Bowel sounds normal. No masses, No organomegaly. Extremities: Extremities normal, atraumatic, no cyanosis or edema. Pulses: 2+ and symmetric all extremities. Skin: Skin color, texture, turgor normal. No rashes or lesions   Neurologic: CNII-XII intact. No gross focal deficits         Data Review:       Recent Days:  Recent Labs     12/17/21  0806 12/15/21  0906   WBC 16.6* 17.8*   HGB 12.2 12.3   HCT 36.4* 35.6*   * 583*     Recent Labs     12/15/21  0906      K 3.7      CO2 26   GLU 89   BUN 6   CREA 0.46*   CA 8.3*     No results for input(s): PH, PCO2, PO2, HCO3, FIO2 in the last 72 hours. 24 Hour Results:  Recent Results (from the past 24 hour(s))   SARS-COV-2    Collection Time: 12/16/21  8:30 PM   Result Value Ref Range    SARS-CoV-2 Please find results under separate order     COVID-19 RAPID TEST    Collection Time: 12/16/21  8:30 PM   Result Value Ref Range    Specimen source Please find results under separate order      COVID-19 rapid test Not Detected Not Detected     CBC WITH AUTOMATED DIFF    Collection Time: 12/17/21  8:06 AM   Result Value Ref Range    WBC 16.6 (H) 4.1 - 11.1 K/uL    RBC 4.40 4. 10 - 5.70 M/uL    HGB 12.2 12.1 - 17.0 g/dL    HCT 36.4 (L) 36.6 - 50.3 %    MCV 82.7 80.0 - 99.0 FL    MCH 27.7 26.0 - 34.0 PG    MCHC 33.5 30.0 - 36.5 g/dL    RDW 12.8 11.5 - 14.5 %    PLATELET 560 (H) 299 - 400 K/uL    MPV 9.5 8.9 - 12.9 FL    NRBC 0.0 0.0  WBC    ABSOLUTE NRBC 0.00 0.00 - 0.01 K/uL    NEUTROPHILS 83 (H) 32 - 75 %    LYMPHOCYTES 10 (L) 12 - 49 %    MONOCYTES 5 5 - 13 %    EOSINOPHILS 1 0 - 7 %    BASOPHILS 0 0 - 1 %    IMMATURE GRANULOCYTES 1 (H) 0 - 0.5 %    ABS. NEUTROPHILS 13.8 (H) 1.8 - 8.0 K/UL    ABS. LYMPHOCYTES 1.7 0.8 - 3.5 K/UL    ABS. MONOCYTES 0.8 0.0 - 1.0 K/UL    ABS. EOSINOPHILS 0.1 0.0 - 0.4 K/UL    ABS. BASOPHILS 0.1 0.0 - 0.1 K/UL    ABS. IMM. GRANS. 0.1 (H) 0.00 - 0.04 K/UL    DF AUTOMATED         XR CHEST PORT   Final Result   1.   There is residual airspace disease versus atelectasis within the right mid   to lower lung zones without short-term interval changes. 2.  There has been partial evacuation of a  right-sided empyema with moderate   residual areas of loculated pleural fluid and gas without short-term interval   changes. 3.  On recent CT imaging from 12/8/2021, there was demonstrated a massive right   pleural effusion as well as tree-in-bud nodular densities within the left lung. These findings can be associated with tuberculosis and nontuberculous   mycobacterial infections. Please correlate clinically. XR CHEST PORT   Final Result      XR CHEST PORT   Final Result      XR CHEST PORT   Final Result      XR CHEST PORT   Final Result      IR THORACENTESIS/INSERT CHEST TUBE   Final Result   Ultrasound of the right upper back was performed with images stored in PACS,   demonstrating heterogeneous collection most consistent with emphysema. Successful US-guided 8.5 percutaneous drainage catheter placement into right   empyema. Plan:   Continue flushing the catheter using 10 ml sterile normal saline and record the   output at least once a day. Consider catheter removal if there is less than 20 mL output over 24 hours.                   CT CHEST WO CONT    (Results Pending)   XR CHEST PORT    (Results Pending)        Assessment:  Large right marked multiloculated pleural effusion,  due parapneumonic/empyema    Bilateral community-acquired pneumonia    Sepsis due to above with fever, leukocytosis, elevated procalcitonin and tachycardia    Acute respiratory failure with hypoxia    Tobacco abuse    Alcohol abuse      Plan:  Continue supportive care including IV Zosyn  Follow-up thoracic surgery recommendations  Increase pain medication frequency to every 4 hours  Anticipate decortication next Tuesday  Case discussed with Dr. Jody Randhawa discussed with: Patient/Family     Disposition: Continued inpatient care    Total time spent with patient: 30 minutes.     Beny Cantrell MD

## 2021-12-18 PROCEDURE — 74011250636 HC RX REV CODE- 250/636: Performed by: INTERNAL MEDICINE

## 2021-12-18 PROCEDURE — 74011250637 HC RX REV CODE- 250/637: Performed by: INTERNAL MEDICINE

## 2021-12-18 PROCEDURE — 74011000258 HC RX REV CODE- 258: Performed by: INTERNAL MEDICINE

## 2021-12-18 PROCEDURE — 65270000029 HC RM PRIVATE

## 2021-12-18 RX ORDER — IBUPROFEN 200 MG
1 TABLET ORAL DAILY
Status: DISCONTINUED | OUTPATIENT
Start: 2021-12-18 | End: 2021-12-27

## 2021-12-18 RX ORDER — LORAZEPAM 1 MG/1
2 TABLET ORAL
Status: DISCONTINUED | OUTPATIENT
Start: 2021-12-18 | End: 2021-12-21

## 2021-12-18 RX ADMIN — OXYCODONE HYDROCHLORIDE 5 MG: 5 TABLET ORAL at 01:37

## 2021-12-18 RX ADMIN — Medication 10 ML: at 23:42

## 2021-12-18 RX ADMIN — PIPERACILLIN SODIUM AND TAZOBACTAM SODIUM 3.38 G: 3; .375 INJECTION, POWDER, LYOPHILIZED, FOR SOLUTION INTRAVENOUS at 01:38

## 2021-12-18 RX ADMIN — Medication 10 ML: at 15:31

## 2021-12-18 RX ADMIN — ACETAMINOPHEN 650 MG: 325 TABLET ORAL at 01:37

## 2021-12-18 RX ADMIN — OXYCODONE HYDROCHLORIDE 5 MG: 5 TABLET ORAL at 23:38

## 2021-12-18 RX ADMIN — PIPERACILLIN SODIUM AND TAZOBACTAM SODIUM 3.38 G: 3; .375 INJECTION, POWDER, LYOPHILIZED, FOR SOLUTION INTRAVENOUS at 10:09

## 2021-12-18 RX ADMIN — PIPERACILLIN SODIUM AND TAZOBACTAM SODIUM 3.38 G: 3; .375 INJECTION, POWDER, LYOPHILIZED, FOR SOLUTION INTRAVENOUS at 17:28

## 2021-12-18 RX ADMIN — Medication 10 ML: at 10:10

## 2021-12-18 RX ADMIN — THIAMINE HCL TAB 100 MG 100 MG: 100 TAB at 10:09

## 2021-12-18 RX ADMIN — ACETAMINOPHEN 650 MG: 325 TABLET ORAL at 23:38

## 2021-12-18 RX ADMIN — OXYCODONE HYDROCHLORIDE 5 MG: 5 TABLET ORAL at 10:08

## 2021-12-18 RX ADMIN — ENOXAPARIN SODIUM 40 MG: 100 INJECTION SUBCUTANEOUS at 10:09

## 2021-12-18 RX ADMIN — AMLODIPINE BESYLATE 5 MG: 5 TABLET ORAL at 10:09

## 2021-12-18 NOTE — PROGRESS NOTES
CT scan from today reviewed,  Showing continued multiloculated effusions, I will see him Monday late afternoon.   He is already on the OR schedule for Decortication on Tuesday Morning

## 2021-12-18 NOTE — PROGRESS NOTES
Pulmonary, Critical Care    Name: Jennifer Quintana MRN: [id number]   : 1970 Hospital: [address] [place] [address]   Date: 2021  Admission date: 2021 Hospital Day: 11       Subjective/Interval History:   Seen on the medical floor awake alert. History is 2 or so weeks of breathing difficulty. Started with severe chest pain pleuritic with chills and sweats he did not take his temperature but felt hot at times. He has had continued worsening presented to the emergency room has a large right pleural effusion. CT scan suggests multiloculated effusion. Leukocytosis up to 34,000 all consistent with empyema. He is a drinker of 1-2 sixpacks a day. Does drink to the point of falling asleep although he states he is never passed out. 12/10 percutaneous drainage catheter placed by IR yesterday with 1600 cc drainage overnight and so far today 700   chest catheter continues to drain he states he feels a little better. Chest x-ray today has small apical pneumothorax with significant amount of remaining pleural fluid   awake alert states he feels better   no specific complaints respirations  12/15    Hospital Problems  Never Reviewed          Codes Class Noted POA    Pleural effusion ICD-10-CM: J90  ICD-9-CM: 511.9  2021 Unknown              IMPRESSION:   1. Right empyema culture  microaerophilic Streptococcus remains on Zosyn  2. Aspiration pneumonia  3. Chest tube in place  4. Nodular density left lung probably aspiration pneumonia have to follow to make sure it clears  5. Hyponatremia improved  6. Alcohol abuse  7. COPD exacerbation  Body mass index is 22.91 kg/m². RECOMMENDATIONS/PLAN:   1. Pleural fluid loculated positive for Streptococcus anguosus  2. Only 100 cc chest tube drainage recorded overnight. Will ask nursing to flush the catheter every 6 hour  3. No wheezing  bronchodilators now inhalers  4. For OR schedule for decortication on Tuesday  5.  Continue IV Zosyn WBC count continues to improve  6. No evidence of developing DTs  7. Continue thiamine  8. Smoking cessation counseling done  9. QuantiFERON test pending           [x] High complexity decision making was performed  [x] See my orders for details      Subjective/Initial History:     I was asked by Essie Vasquez MD to see Meryl Sorto  a 46 y.o.  male in consultation for a chief complaint of multiloculated right pleural effusion likely empyema      No Known Allergies     MAR reviewed and pertinent medications noted or modified as needed     Current Facility-Administered Medications   Medication    LORazepam (ATIVAN) tablet 2 mg    albuterol (PROVENTIL HFA, VENTOLIN HFA, PROAIR HFA) inhaler 2 Puff    budesonide-formoteroL (SYMBICORT) 160-4.5 mcg/actuation HFA inhaler 2 Puff    oxyCODONE IR (ROXICODONE) tablet 5 mg    amLODIPine (NORVASC) tablet 5 mg    sodium chloride (NS) flush 5-40 mL    sodium chloride (NS) flush 5-40 mL    acetaminophen (TYLENOL) tablet 650 mg    Or    acetaminophen (TYLENOL) suppository 650 mg    polyethylene glycol (MIRALAX) packet 17 g    ondansetron (ZOFRAN ODT) tablet 4 mg    Or    ondansetron (ZOFRAN) injection 4 mg    enoxaparin (LOVENOX) injection 40 mg    piperacillin-tazobactam (ZOSYN) 3.375 g in 0.9% sodium chloride (MBP/ADV) 100 mL MBP    influenza vaccine 2021-22 (6 mos+)(PF) (FLUARIX/FLULAVAL/FLUZONE QUAD) injection 0.5 mL    thiamine mononitrate (B-1) tablet 100 mg    0.9% sodium chloride infusion 10 mL      Patient PCP: None  PMH:  has a past medical history of Asthma and Ill-defined condition. PSH:   has a past surgical history that includes ir thoracentesis/insert chest tube (12/9/2021). FHX: family history is not on file. SHX:  reports that he has quit smoking. He has never used smokeless tobacco. He reports current alcohol use. He reports previous drug use. To me he admits smoking 1 to 2 packs/day ever since he was 8 or 10.   He drinks 1-2 sixpacks a day beer     Systemic review:  General states his weight stable has been having chills and fever for the last 2 weeks or more  Eyes no double vision or momentary blindness  ENT no drainage or facial pain  Musculoskeletal no swollen tender joints  Endocrinologic no polyuria polydipsia  Neurologic no seizures or syncope  Gastrointestinal no nausea vomiting acid indigestion. Genitourinary no pain or discomfort on urination  Cardiovascular no history of heart disease or ankle edema he has had the pleuritic chest pain no diaphoresis  Respiratory as mentioned cough some yellow sputum fever chills shortness of breath and pleuritic pain on the right    Objective:     Vital Signs: Telemetry:    normal sinus rhythm Intake/Output:   Visit Vitals  /79 (BP 1 Location: Left upper arm, BP Patient Position: At rest)   Pulse 71   Temp 98.6 °F (37 °C)   Resp 18   Ht 5' 11\" (1.803 m)   Wt 74.5 kg (164 lb 3.9 oz)   SpO2 96%   BMI 22.91 kg/m²       Temp (24hrs), Av.9 °F (37.2 °C), Min:97.9 °F (36.6 °C), Max:99.8 °F (37.7 °C)        O2 Device: None (Room air) O2 Flow Rate (L/min): 1.5 l/min       Wt Readings from Last 4 Encounters:   12/15/21 74.5 kg (164 lb 3.9 oz)   21 73.5 kg (162 lb)          Intake/Output Summary (Last 24 hours) at 2021 0940  Last data filed at 2021 0815  Gross per 24 hour   Intake    Output 20 ml   Net -20 ml       Last shift:      701 - 1900  In: -   Out: 20 [Drains:20]  Last 3 shifts: 1901 -  07  In: 100 [I.V.:100]  Out: 1430 [Urine:1400; Drains:30]       Physical Exam:   General:  male; lying in bed no distress no accessory muscle usage  HEENT: NCAT, poor dentition,  Eyes: anicteric; conjunctiva clear extraocular movements intact  Neck: no nodes, no JVD, no accessory MM use.   Chest: no deformity,   Cardiac: Regular rate and rhythm  Lungs: Markedly improved breath sounds on the right present anteriorly and upper lung posteriorly and developing some in the right base now, left side fairly clear  Abd: Thin soft positive bowel sounds  Ext: no edema; no joint swelling; No clubbing  : clear urine  Neuro: Awake alert calm speech is clear moves all 4 extremities  Psych- no agitation, oriented to person;   Skin: warm, dry, no cyanosis;   Pulses: Brachial radial pulses intact  Capillary: Normal capillary refill    Labs:    Recent Labs     12/17/21  0806   WBC 16.6*   HGB 12.2   *     No results for input(s): NA, K, CL, CO2, GLU, BUN, CREA, CA, MG, PHOS, LAC, ALB, TBIL, ALT, AML, LPSE in the last 72 hours. No lab exists for component: SGOT. Pleural fluid WBC count 47,840 with 56% polys 32% lymphs pleural fluid protein 0.8 glucose 4  Pleural culture with  microaerophilic Streptococcus    Lab Results   Component Value Date/Time    Culture result: Heavy Streptococcus anginosus 12/09/2021 11:30 AM    Culture result: No growth 6 days 12/09/2021 07:42 AM    Culture result: No growth 6 days 12/08/2021 08:45 PM     Imaging:    CXR Results  (Last 48 hours)               12/12/21 0854  XR CHEST PORT Final result    Narrative:  Chest single view. Comparison single view chest December 11, 2021. Similar appearance for the right hemithorax. Predominant right basilar airspace opacity, similar volume. Unchanged hydropneumothorax. Similar degree apical pleural separation. Left lung aerated. Cardiac and mediastinal structures unchanged. To me the x-ray is improved you can start to see the right hemidiaphragm now implying further decrease in effusion in the apical pneumothorax is minimally improved       12/11/21 0752  XR CHEST PORT Final result    Narrative:  Chest single view. Comparison single view chest December 8, 2021. Right-sided hydropneumothorax. Estimated moderate volume pleural fluid. 1.1 cm   apical pleural separation. Left lung aerated. Cardiac and mediastinal structures   unchanged.            Results from Hospital Encounter encounter on 12/08/21    XR CHEST PORT    Narrative  Chest single view. Comparison single view chest December 13, 2021. Unchanged appearance right hemithorax. Similar hydropneumothorax. 2.1 cm apical pleural separation. Same patchy reticular markings RML/RLL lung. Left lung aerated. Cardiac and mediastinal structures unchanged. XR CHEST PORT    Narrative  Reason for Visit:  HISTORY: Empyema. TECHNIQUE: Portable radiograph the chest dated 12/16/2021 obtained at 7:52 AM.  COMPARISON: Portable radiograph the chest dated 12/15/2021 obtained at 1:46 PM.  LIMITATIONS: None. TUBES/LINES:  There is a small to medium caliber pigtail drainage catheter  within the right diaphragmatic pleura. HEART AND PULMONARY VESSELS: Normal.    LUNG PARENCHYMA: There is airspace disease versus atelectasis within the right  mid to lower lung zones without short-term interval change. PLEURA: There is a moderate volume of loculated fluid and air demonstrated  within the right apical, lateral, and diaphragmatic pleura. The regions of  residual pleural fluid and pleural gas are unchanged. MEDIASTINUM: Normal.    BONE/SOFT TISSUES: This examination is negative for acute osseous abnormalities. Impression  1. There is residual airspace disease versus atelectasis within the right mid  to lower lung zones without short-term interval changes. 2.  There has been partial evacuation of a  right-sided empyema with moderate  residual areas of loculated pleural fluid and gas without short-term interval  changes. 3.  On recent CT imaging from 12/8/2021, there was demonstrated a massive right  pleural effusion as well as tree-in-bud nodular densities within the left lung. These findings can be associated with tuberculosis and nontuberculous  mycobacterial infections. Please correlate clinically.       XR CHEST PORT    Narrative  Chest single view    Comparison single view chest 12/12/2021. Similar appearance right hemithorax. Predominant right basilar opacity. Similar  known empyema. Apical air component with 2.1 cm pleural separation. Left lung aerated. Cardiac and mediastinal structures unchanged. Results from [place] encounter on 12/08/21    CT CHEST WO CONT    Narrative  Noncontrast exam.  Comparison chest radiograph yesterday. Dose Reduction:  All CT scans at this facility are performed using dose reduction optimization  techniques as appropriate to a performed exam including the following: Automated  exposure control, adjustments of the mA and/or kV according to patient size, or  use of iterative reconstruction technique. 6 x 6 x 3.5 cm loculated collection with an air-fluid level in the right apex. Complex densities in the right mid and lower lower lung including thick-walled  lesion containing central well-defined gas lucencies, potentially loculated 6 cm  fluid collection in the lateral right midlung, and small posterior inferior  fluid containing a pigtail drain and multiple gas bubbles in a pattern  suggesting that the fluid is highly viscous. Multiple air bronchograms are noted  within these complex densities. Small left pleural effusion and left basilar atelectasis. No anterior pneumothorax. Right hilar and mediastinal adenopathy, likely reactive. Atherosclerosis, including coronary artery calcification. Impression  Complex findings in the right hemithorax as above, question related to  infection. Small left pleural effusion and mild left basilar atelectasis. Right hilar and mild mediastinal adenopathy is nonspecific, may be reactive. Inferior      · 12/9 discussion 2-week history of pleuritic pain cough chills sweats fever and a drinker who will drink to the point of falling asleep most likely has aspiration pneumonia with empyema. IR has been consulted for joellen peters.   He very likely will have multiple loculations preventing drainage and may need VATS  · 12/11 feels fine respirations nonlabored has just an occasional air bubble when he coughs chest x-ray with small apical pneumothorax significant fluid remains although it is certainly improved culture consistent with oral portillo to remain on Zosyn WBC count improved we will discontinue vancomycin  · 12/12 doing well at this point no air leak seen on chest tube and chest x-ray is mildly improved. Continue current treatment  · 12/13 drainage has decreased from the chest catheter. Will flush with saline and follow drainage.   Chest x-ray is improved diaphragm can be seen on the right with infiltrate in that area likely representing pneumonia cannot tell how much fluid remains  · 12/15 chest x-ray   · 12/17 on antibiotic no chest pain repeat chest x-ray shows infiltrate atelectasis right midlung and improvement in there right-sided pleural loculated effusion    Lorena Monae MD

## 2021-12-18 NOTE — PROGRESS NOTES
Hospitalist Progress Note    Subjective:   Daily Progress Note: 12/18/2021 10:42 AM    Hospital Course: The patient is seen for follow up. Natasha Lopez is a 57-year-old male with history of hypertension and asthma who was transferred from [facility] after he was presented there with shortness of breath and productive cough x2 weeks as well as right-sided pleuritic chest pain.  Labs showed a white count of 34,000 and mild lactic acidosis.  CT of the chest showed a large multiloculated right pleural effusion as well as a left apical nodule and tree-in-bud centrilobular nodules throughout the left lower lobe.  Patient was hypoxic and requiring 4 L nasal cannula.  Patient is a chronic smoker. He is scheduled for surgery on 12/21 for decortication. Chest tube drainage is serosanguinous, continues on IV antibiotics per infectious disease.        Subjective: Pt seen in the room, no complaints of dyspnea or pain    Current Facility-Administered Medications   Medication Dose Route Frequency    LORazepam (ATIVAN) tablet 2 mg  2 mg Oral Q4H PRN    albuterol (PROVENTIL HFA, VENTOLIN HFA, PROAIR HFA) inhaler 2 Puff  2 Puff Inhalation Q6H PRN    budesonide-formoteroL (SYMBICORT) 160-4.5 mcg/actuation HFA inhaler 2 Puff  2 Puff Inhalation BID PRN    oxyCODONE IR (ROXICODONE) tablet 5 mg  5 mg Oral Q4H PRN    amLODIPine (NORVASC) tablet 5 mg  5 mg Oral DAILY    sodium chloride (NS) flush 5-40 mL  5-40 mL IntraVENous Q8H    sodium chloride (NS) flush 5-40 mL  5-40 mL IntraVENous PRN    acetaminophen (TYLENOL) tablet 650 mg  650 mg Oral Q6H PRN    Or    acetaminophen (TYLENOL) suppository 650 mg  650 mg Rectal Q6H PRN    polyethylene glycol (MIRALAX) packet 17 g  17 g Oral DAILY PRN    ondansetron (ZOFRAN ODT) tablet 4 mg  4 mg Oral Q8H PRN    Or    ondansetron (ZOFRAN) injection 4 mg  4 mg IntraVENous Q6H PRN    enoxaparin (LOVENOX) injection 40 mg  40 mg SubCUTAneous DAILY    piperacillin-tazobactam (ZOSYN) 3.375 g in 0.9% sodium chloride (MBP/ADV) 100 mL MBP  3.375 g IntraVENous Q8H    influenza vaccine  (6 mos+)(PF) (FLUARIX/FLULAVAL/FLUZONE QUAD) injection 0.5 mL  1 Each IntraMUSCular PRIOR TO DISCHARGE    thiamine mononitrate (B-1) tablet 100 mg  100 mg Oral DAILY        Review of Systems:    Review of Systems   Constitutional: Negative for chills and fever. Respiratory: Negative for cough and shortness of breath. Cardiovascular: Negative for chest pain. Gastrointestinal: Negative for heartburn and nausea. Genitourinary: Negative for dysuria and urgency. Neurological: Negative for dizziness and headaches. Objective:     Visit Vitals  /79 (BP 1 Location: Left upper arm, BP Patient Position: At rest)   Pulse 71   Temp 98.6 °F (37 °C)   Resp 18   Ht 5' 11\" (1.803 m)   Wt 74.5 kg (164 lb 3.9 oz)   SpO2 96%   BMI 22.91 kg/m²    O2 Flow Rate (L/min): 1.5 l/min O2 Device: None (Room air)    Temp (24hrs), Av.9 °F (37.2 °C), Min:97.9 °F (36.6 °C), Max:99.8 °F (37.7 °C)       07 - 1900  In: -   Out: 20 [Drains:20]  1901 -  0700  In: 100 [I.V.:100]  Out: 1430 [Urine:1400; Drains:30]    PHYSICAL EXAM:    Physical Exam  Constitutional:       General: He is not in acute distress. Cardiovascular:      Rate and Rhythm: Normal rate and regular rhythm. Pulses: Normal pulses. Heart sounds: Normal heart sounds. Pulmonary:      Effort: Pulmonary effort is normal.      Breath sounds: Normal breath sounds. Comments: Diminished in right base, right chest tube , dressing intact,  No sub-q emphysema felt. Musculoskeletal:         General: Normal range of motion. Skin:     General: Skin is warm and dry. Neurological:      Mental Status: He is oriented to person, place, and time. Psychiatric:         Mood and Affect: Mood normal.         Behavior: Behavior normal.            Data Review    No results found for this or any previous visit (from the past 24 hour(s)).     CT CHEST WO CONT   Final Result   Complex findings in the right hemithorax as above, question related to   infection. Small left pleural effusion and mild left basilar atelectasis. Right hilar and mild mediastinal adenopathy is nonspecific, may be reactive. Inferior         XR CHEST PORT   Final Result   1. There is residual airspace disease versus atelectasis within the right mid   to lower lung zones without short-term interval changes. 2.  There has been partial evacuation of a  right-sided empyema with moderate   residual areas of loculated pleural fluid and gas without short-term interval   changes. 3.  On recent CT imaging from 12/8/2021, there was demonstrated a massive right   pleural effusion as well as tree-in-bud nodular densities within the left lung. These findings can be associated with tuberculosis and nontuberculous   mycobacterial infections. Please correlate clinically. XR CHEST PORT   Final Result      XR CHEST PORT   Final Result      XR CHEST PORT   Final Result      XR CHEST PORT   Final Result      IR THORACENTESIS/INSERT CHEST TUBE   Final Result   Ultrasound of the right upper back was performed with images stored in PACS,   demonstrating heterogeneous collection most consistent with emphysema. Successful US-guided 8.5 percutaneous drainage catheter placement into right   empyema. Plan:   Continue flushing the catheter using 10 ml sterile normal saline and record the   output at least once a day. Consider catheter removal if there is less than 20 mL output over 24 hours. XR CHEST PORT    (Results Pending)       Active Problems:    Pleural effusion (12/8/2021)        Assessment/Plan:   1. Right multiloculated pleural effusion- secondary to empyema, CT surgery, pulmonary following, plan for decortication on 12/21, monitor CT drainage. 2. Sepsis- secondary to #1, ID following, on IV zosyn, WBC trending down.     3. Hx of alcohol and tobacco use- ativan prn, nicotine patch    4.  Hypertension- controlled, on norvasc        DVT Prophylaxis: lovenox  Code Status:  Full Code  POA: NO[facility] daughter [facility], [id number] discussed with:   ______patient, staff nurse, pulmonary, ID_________________________________________________________    John Barrera NP

## 2021-12-18 NOTE — PROGRESS NOTES
Infectious Diseases Progress Note  Cory Boyd MD  [phone]  Date:2021       Room:[facility]  Patient Name:Ricky Ken Carrel     YOB: 1970     Age:51 y.o. 51M with history of hypertension, bronchitis, asthma. Long history of smoking. He reports prior vaccinations against coronavirus.     21 admitted to [facility] from [facility] after he was found to have a pleural effusion. He presents with two weeks of productive cough, shortness of breath, fevers, chills, generalized weakness. During the ED course underwent CT imaging which revealed large multiloculated right pleural effusion.     21 CT Chest  IMPRESSION  Large multiloculated right pleural effusion with subsequent near complete  atelectasis of the right lung. Therapeutic and diagnostic thoracentesis  recommended. Tree-in-bud centrilobular nodules present throughout the left lower lobe with  larger nodular airspace opacity apical posterior segment left upper lobe. Findings consistent with bronchiolitis/bronchopneumonia. The larger airspace  nodule in the apical posterior segment left upper lobe require surveillance.     21 underwent IR thoracentesis  IMPRESSION  Ultrasound of the right upper back was performed with images stored in PACS,  demonstrating heterogeneous collection most consistent with emphysema. Successful US-guided 8.5 percutaneous drainage catheter placement into right  empyema.      Pleural cultures reveal Streptococcus anginosus and I have been asked to see him in consultation.     Subjective    Subjective:  Symptoms:  Stable. Review of Systems   All other systems reviewed and are negative.     Objective         Vitals Last 24 Hours:  TEMPERATURE:  Temp  Av.9 °F (37.2 °C)  Min: 97.9 °F (36.6 °C)  Max: 99.8 °F (37.7 °C)  RESPIRATIONS RANGE: Resp  Av.5  Min: 16  Max: 18  PULSE OXIMETRY RANGE: SpO2  Av.8 %  Min: 94 %  Max: 96 %  PULSE RANGE: Pulse  Av.8  Min: 71  Max: 84  BLOOD PRESSURE RANGE: Systolic (59DTR), TWO:454 , Min:110 , MLK:868   ; Diastolic (08UMN), FLM:46, Min:61, Max:79    I/O (24Hr): Intake/Output Summary (Last 24 hours) at 12/18/2021 1012  Last data filed at 12/18/2021 0815  Gross per 24 hour   Intake    Output 20 ml   Net -20 ml     Objective:  General Appearance:  Comfortable. Vital signs: (most recent): Blood pressure 123/79, pulse 71, temperature 98.6 °F (37 °C), resp. rate 18, height 5' 11\" (1.803 m), weight 74.5 kg (164 lb 3.9 oz), SpO2 96 %. Vital signs are normal.    HEENT: Normal HEENT exam.    Lungs:  Normal effort and normal respiratory rate. Heart: Normal rate. Regular rhythm. Abdomen: Abdomen is soft. Bowel sounds are normal.     Extremities: Normal range of motion. Pulses: Distal pulses are intact. Neurological: Patient is alert. Pupils:  Pupils are equal, round, and reactive to light. Skin:  Warm and dry. Labs/Imaging/Diagnostics    Labs:  CBC:  Recent Labs     12/17/21  0806   WBC 16.6*   RBC 4.40   HGB 12.2   HCT 36.4*   MCV 82.7   RDW 12.8   *     CHEMISTRIES:  No results for input(s): NA, K, CL, CO2, BUN, CA, PHOS, MG in the last 72 hours. No lab exists for component: CREATININE, GLUCOSEPT/INR:No results for input(s): INR, INREXT, INREXT in the last 72 hours. No lab exists for component: PROTIME  APTT:No results for input(s): APTT in the last 72 hours. LIVER PROFILE:No results for input(s): AST, ALT in the last 72 hours. No lab exists for component: [place] [facility], ALKPHOS  Lab Results   Component Value Date/Time    ALT (SGPT) 56 12/08/2021 05:34 PM    AST (SGOT) 51 (H) 12/08/2021 05:34 PM    Alk. phosphatase 118 (H) 12/08/2021 05:34 PM    Bilirubin, total 1.0 12/08/2021 05:34 PM       Imaging Last 24 Hours:  CT CHEST WO CONT    Result Date: 12/17/2021  Noncontrast exam. Comparison chest radiograph yesterday.  Dose Reduction: All CT scans at this facility are performed using dose reduction optimization techniques as appropriate to a performed exam including the following: Automated exposure control, adjustments of the mA and/or kV according to patient size, or use of iterative reconstruction technique. 6 x 6 x 3.5 cm loculated collection with an air-fluid level in the right apex. Complex densities in the right mid and lower lower lung including thick-walled lesion containing central well-defined gas lucencies, potentially loculated 6 cm fluid collection in the lateral right midlung, and small posterior inferior fluid containing a pigtail drain and multiple gas bubbles in a pattern suggesting that the fluid is highly viscous. Multiple air bronchograms are noted within these complex densities. Small left pleural effusion and left basilar atelectasis. No anterior pneumothorax. Right hilar and mediastinal adenopathy, likely reactive. Atherosclerosis, including coronary artery calcification. Complex findings in the right hemithorax as above, question related to infection. Small left pleural effusion and mild left basilar atelectasis. Right hilar and mild mediastinal adenopathy is nonspecific, may be reactive. Inferior     Assessment//Plan   Active Problems:    Pleural effusion (12/8/2021)      Assessment & Plan   51M with history of hypertension, bronchitis, asthma. Long history of smoking. He reports prior vaccinations against coronavirus.     12/8/21 admitted to [facility] from [facility] after he was found to have a pleural effusion. He presents with two weeks of productive cough, shortness of breath, fevers, chills, generalized weakness. During the ED course underwent CT imaging which revealed large multiloculated right pleural effusion.     12/8/21 CT Chest  IMPRESSION  Large multiloculated right pleural effusion with subsequent near complete  atelectasis of the right lung. Therapeutic and diagnostic thoracentesis  recommended.   Tree-in-bud centrilobular nodules present throughout the left lower lobe with  larger nodular airspace opacity apical posterior segment left upper lobe. Findings consistent with bronchiolitis/bronchopneumonia. The larger airspace  nodule in the apical posterior segment left upper lobe require surveillance.     12/9/21 underwent IR thoracentesis  IMPRESSION  Ultrasound of the right upper back was performed with images stored in PACS,  demonstrating heterogeneous collection most consistent with emphysema.    Successful US-guided 8.5 percutaneous drainage catheter placement into right  empyema.      Pleural cultures reveal Streptococcus anginosus and I have been asked to see him in consultation.     MICROBIOLOGY     12/8/21            Covid19           Negative  12/8/21            Blood               Negative  12/9/21            Blood               Negative     12/9/21            IR pleural         Heavy Streptococcus anginosus     12/16/21          Quant TB         Pending    12/16/21 Pleural  AFB pending      Fungal pending     ASSESSMENT AND RECOMMENDATIONS     1) Complex loculated pleural effusion in the setting of advanced aspiration pneumonia.                 If TB quantiferon is negative:                 Zosyn iv while in hospital              Once stable for discharge, oral Augmentin to complete outpatient course                          Augmentin 875mg po bid through 1/7/22       Electronically signed by Bridger Fernandez MD on 12/18/2021 at 12:48 PM

## 2021-12-19 LAB
ABO + RH BLD: NORMAL
BLOOD GROUP ANTIBODIES SERPL: NEGATIVE
M TB IFN-G BLD-IMP: NEGATIVE
QUANTIFERON CRITERIA, QFI1T: NORMAL
QUANTIFERON INCUBATION, QF1T: NORMAL
QUANTIFERON MITOGEN VALUE: 2.41 IU/ML
QUANTIFERON NIL VALUE: 0.04 IU/ML
QUANTIFERON TB1 AG: 0.02 IU/ML
QUANTIFERON TB2 AG: 0.02 IU/ML
SPECIMEN EXP DATE BLD: NORMAL

## 2021-12-19 PROCEDURE — 74011250637 HC RX REV CODE- 250/637: Performed by: INTERNAL MEDICINE

## 2021-12-19 PROCEDURE — 86901 BLOOD TYPING SEROLOGIC RH(D): CPT

## 2021-12-19 PROCEDURE — 74011250636 HC RX REV CODE- 250/636: Performed by: INTERNAL MEDICINE

## 2021-12-19 PROCEDURE — 74011000258 HC RX REV CODE- 258: Performed by: INTERNAL MEDICINE

## 2021-12-19 PROCEDURE — 65270000029 HC RM PRIVATE

## 2021-12-19 PROCEDURE — 36415 COLL VENOUS BLD VENIPUNCTURE: CPT

## 2021-12-19 RX ADMIN — AMLODIPINE BESYLATE 5 MG: 5 TABLET ORAL at 11:59

## 2021-12-19 RX ADMIN — PIPERACILLIN SODIUM AND TAZOBACTAM SODIUM 3.38 G: 3; .375 INJECTION, POWDER, LYOPHILIZED, FOR SOLUTION INTRAVENOUS at 11:59

## 2021-12-19 RX ADMIN — THIAMINE HCL TAB 100 MG 100 MG: 100 TAB at 11:59

## 2021-12-19 RX ADMIN — ACETAMINOPHEN 650 MG: 325 TABLET ORAL at 17:56

## 2021-12-19 RX ADMIN — Medication 10 ML: at 17:58

## 2021-12-19 RX ADMIN — OXYCODONE HYDROCHLORIDE 5 MG: 5 TABLET ORAL at 17:56

## 2021-12-19 RX ADMIN — PIPERACILLIN SODIUM AND TAZOBACTAM SODIUM 3.38 G: 3; .375 INJECTION, POWDER, LYOPHILIZED, FOR SOLUTION INTRAVENOUS at 17:56

## 2021-12-19 RX ADMIN — ACETAMINOPHEN 650 MG: 325 TABLET ORAL at 12:06

## 2021-12-19 RX ADMIN — PIPERACILLIN SODIUM AND TAZOBACTAM SODIUM 3.38 G: 3; .375 INJECTION, POWDER, LYOPHILIZED, FOR SOLUTION INTRAVENOUS at 02:38

## 2021-12-19 RX ADMIN — OXYCODONE HYDROCHLORIDE 5 MG: 5 TABLET ORAL at 12:06

## 2021-12-19 NOTE — PROGRESS NOTES
Pulmonary, Critical Care    Name: Edwardo Romero MRN: [id number]   : 1970 Hospital: [address] [place] [address]   Date: 2021  Admission date: 2021 Hospital Day: 12       Subjective/Interval History:   Seen on the medical floor awake alert. History is 2 or so weeks of breathing difficulty. Started with severe chest pain pleuritic with chills and sweats he did not take his temperature but felt hot at times. He has had continued worsening presented to the emergency room has a large right pleural effusion. CT scan suggests multiloculated effusion. Leukocytosis up to 34,000 all consistent with empyema. He is a drinker of 1-2 sixpacks a day. Does drink to the point of falling asleep although he states he is never passed out. 12/10 percutaneous drainage catheter placed by IR yesterday with 1600 cc drainage overnight and so far today 700   chest catheter continues to drain he states he feels a little better. Chest x-ray today has small apical pneumothorax with significant amount of remaining pleural fluid   awake alert states he feels better   no specific complaints respirations   for decortication on Tuesday    Hospital Problems  Never Reviewed          Codes Class Noted POA    Pleural effusion ICD-10-CM: J90  ICD-9-CM: 511.9  2021 Unknown              IMPRESSION:   1. Right empyema culture  microaerophilic Streptococcus remains on Zosyn  2. Aspiration pneumonia  3. Chest tube in place  4. Nodular density left lung probably aspiration pneumonia have to follow to make sure it clears  5. Hyponatremia improved  6. Alcohol abuse  7. COPD exacerbation  Body mass index is 22.91 kg/m². RECOMMENDATIONS/PLAN:   1. Pleural fluid loculated positive for Streptococcus anguosus  2. Only 100 cc chest tube drainage recorded overnight. Will ask nursing to flush the catheter every 6 hour  3. No wheezing  bronchodilators now inhalers  4.  For OR schedule for decortication on Tuesday  5. Continue IV Zosyn WBC count continues to improve  6. No evidence of developing DTs  7. Continue thiamine  8. Smoking cessation counseling done  9. QuantiFERON test pending           [x] High complexity decision making was performed  [x] See my orders for details      Subjective/Initial History:     I was asked by Sami Collins MD to see Ricardo Galvin  a 46 y.o.  male in consultation for a chief complaint of multiloculated right pleural effusion likely empyema      No Known Allergies     MAR reviewed and pertinent medications noted or modified as needed     Current Facility-Administered Medications   Medication    LORazepam (ATIVAN) tablet 2 mg    nicotine (NICODERM CQ) 21 mg/24 hr patch 1 Patch    albuterol (PROVENTIL HFA, VENTOLIN HFA, PROAIR HFA) inhaler 2 Puff    budesonide-formoteroL (SYMBICORT) 160-4.5 mcg/actuation HFA inhaler 2 Puff    oxyCODONE IR (ROXICODONE) tablet 5 mg    amLODIPine (NORVASC) tablet 5 mg    sodium chloride (NS) flush 5-40 mL    sodium chloride (NS) flush 5-40 mL    acetaminophen (TYLENOL) tablet 650 mg    Or    acetaminophen (TYLENOL) suppository 650 mg    polyethylene glycol (MIRALAX) packet 17 g    ondansetron (ZOFRAN ODT) tablet 4 mg    Or    ondansetron (ZOFRAN) injection 4 mg    enoxaparin (LOVENOX) injection 40 mg    piperacillin-tazobactam (ZOSYN) 3.375 g in 0.9% sodium chloride (MBP/ADV) 100 mL MBP    influenza vaccine 2021-22 (6 mos+)(PF) (FLUARIX/FLULAVAL/FLUZONE QUAD) injection 0.5 mL    thiamine mononitrate (B-1) tablet 100 mg      Patient PCP: None  PMH:  has a past medical history of Asthma and Ill-defined condition. PSH:   has a past surgical history that includes ir thoracentesis/insert chest tube (12/9/2021). FHX: family history is not on file. SHX:  reports that he has quit smoking. He has never used smokeless tobacco. He reports current alcohol use. He reports previous drug use.   To me he admits smoking 1 to 2 packs/day ever since he was 8 or 10. He drinks 1-2 sixpacks a day beer     Systemic review:  General states his weight stable has been having chills and fever for the last 2 weeks or more  Eyes no double vision or momentary blindness  ENT no drainage or facial pain  Musculoskeletal no swollen tender joints  Endocrinologic no polyuria polydipsia  Neurologic no seizures or syncope  Gastrointestinal no nausea vomiting acid indigestion. Genitourinary no pain or discomfort on urination  Cardiovascular no history of heart disease or ankle edema he has had the pleuritic chest pain no diaphoresis  Respiratory as mentioned cough some yellow sputum fever chills shortness of breath and pleuritic pain on the right    Objective:     Vital Signs: Telemetry:    normal sinus rhythm Intake/Output:   Visit Vitals  /65 (BP 1 Location: Left upper arm)   Pulse 83   Temp 99.3 °F (37.4 °C)   Resp 16   Ht 5' 11\" (1.803 m)   Wt 74.5 kg (164 lb 3.9 oz)   SpO2 100%   BMI 22.91 kg/m²       Temp (24hrs), Av.2 °F (37.3 °C), Min:98.7 °F (37.1 °C), Max:99.6 °F (37.6 °C)        O2 Device: None (Room air) O2 Flow Rate (L/min): 1.5 l/min       Wt Readings from Last 4 Encounters:   12/15/21 74.5 kg (164 lb 3.9 oz)   21 73.5 kg (162 lb)        No intake or output data in the 24 hours ending 21 1041    Last shift:      No intake/output data recorded. Last 3 shifts:  1901 -  0700  In: -   Out: 20 [Drains:20]       Physical Exam:   General:  male; lying in bed no distress no accessory muscle usage  HEENT: NCAT, poor dentition,  Eyes: anicteric; conjunctiva clear extraocular movements intact  Neck: no nodes, no JVD, no accessory MM use.   Chest: no deformity,   Cardiac: Regular rate and rhythm  Lungs: Markedly improved breath sounds on the right present anteriorly and upper lung posteriorly and developing some in the right base now, left side fairly clear  Abd: Thin soft positive bowel sounds  Ext: no edema; no joint swelling; No clubbing  : clear urine  Neuro: Awake alert calm speech is clear moves all 4 extremities  Psych- no agitation, oriented to person;   Skin: warm, dry, no cyanosis;   Pulses: Brachial radial pulses intact  Capillary: Normal capillary refill    Labs:    Recent Labs     12/17/21  0806   WBC 16.6*   HGB 12.2   *     No results for input(s): NA, K, CL, CO2, GLU, BUN, CREA, CA, MG, PHOS, LAC, ALB, TBIL, ALT, AML, LPSE in the last 72 hours. No lab exists for component: SGOT. Pleural fluid WBC count 47,840 with 56% polys 32% lymphs pleural fluid protein 0.8 glucose 4  Pleural culture with  microaerophilic Streptococcus    Lab Results   Component Value Date/Time    Culture result: Heavy Streptococcus anginosus 12/09/2021 11:30 AM    Culture result: No growth 6 days 12/09/2021 07:42 AM    Culture result: No growth 6 days 12/08/2021 08:45 PM     Imaging:    CXR Results  (Last 48 hours)               12/12/21 0854  XR CHEST PORT Final result    Narrative:  Chest single view. Comparison single view chest December 11, 2021. Similar appearance for the right hemithorax. Predominant right basilar airspace opacity, similar volume. Unchanged hydropneumothorax. Similar degree apical pleural separation. Left lung aerated. Cardiac and mediastinal structures unchanged. To me the x-ray is improved you can start to see the right hemidiaphragm now implying further decrease in effusion in the apical pneumothorax is minimally improved       12/11/21 0752  XR CHEST PORT Final result    Narrative:  Chest single view. Comparison single view chest December 8, 2021. Right-sided hydropneumothorax. Estimated moderate volume pleural fluid. 1.1 cm   apical pleural separation. Left lung aerated. Cardiac and mediastinal structures   unchanged. Results from [place] encounter on 12/08/21    XR CHEST PORT    Narrative  Chest single view.     Comparison single view chest December 13, 2021. Unchanged appearance right hemithorax. Similar hydropneumothorax. 2.1 cm apical pleural separation. Same patchy reticular markings RML/RLL lung. Left lung aerated. Cardiac and mediastinal structures unchanged. XR CHEST PORT    Narrative  Reason for Visit:  HISTORY: Empyema. TECHNIQUE: Portable radiograph the chest dated 12/16/2021 obtained at 7:52 AM.  COMPARISON: Portable radiograph the chest dated 12/15/2021 obtained at 1:46 PM.  LIMITATIONS: None. TUBES/LINES:  There is a small to medium caliber pigtail drainage catheter  within the right diaphragmatic pleura. HEART AND PULMONARY VESSELS: Normal.    LUNG PARENCHYMA: There is airspace disease versus atelectasis within the right  mid to lower lung zones without short-term interval change. PLEURA: There is a moderate volume of loculated fluid and air demonstrated  within the right apical, lateral, and diaphragmatic pleura. The regions of  residual pleural fluid and pleural gas are unchanged. MEDIASTINUM: Normal.    BONE/SOFT TISSUES: This examination is negative for acute osseous abnormalities. Impression  1. There is residual airspace disease versus atelectasis within the right mid  to lower lung zones without short-term interval changes. 2.  There has been partial evacuation of a  right-sided empyema with moderate  residual areas of loculated pleural fluid and gas without short-term interval  changes. 3.  On recent CT imaging from 12/8/2021, there was demonstrated a massive right  pleural effusion as well as tree-in-bud nodular densities within the left lung. These findings can be associated with tuberculosis and nontuberculous  mycobacterial infections. Please correlate clinically. XR CHEST PORT    Narrative  Chest single view    Comparison single view chest 12/12/2021. Similar appearance right hemithorax. Predominant right basilar opacity. Similar  known empyema.  Apical air component with 2.1 cm pleural separation. Left lung aerated. Cardiac and mediastinal structures unchanged. Results from [place] encounter on 12/08/21    CT CHEST WO CONT    Narrative  Noncontrast exam.  Comparison chest radiograph yesterday. Dose Reduction:  All CT scans at this facility are performed using dose reduction optimization  techniques as appropriate to a performed exam including the following: Automated  exposure control, adjustments of the mA and/or kV according to patient size, or  use of iterative reconstruction technique. 6 x 6 x 3.5 cm loculated collection with an air-fluid level in the right apex. Complex densities in the right mid and lower lower lung including thick-walled  lesion containing central well-defined gas lucencies, potentially loculated 6 cm  fluid collection in the lateral right midlung, and small posterior inferior  fluid containing a pigtail drain and multiple gas bubbles in a pattern  suggesting that the fluid is highly viscous. Multiple air bronchograms are noted  within these complex densities. Small left pleural effusion and left basilar atelectasis. No anterior pneumothorax. Right hilar and mediastinal adenopathy, likely reactive. Atherosclerosis, including coronary artery calcification. Impression  Complex findings in the right hemithorax as above, question related to  infection. Small left pleural effusion and mild left basilar atelectasis. Right hilar and mild mediastinal adenopathy is nonspecific, may be reactive. Inferior      · 12/9 discussion 2-week history of pleuritic pain cough chills sweats fever and a drinker who will drink to the point of falling asleep most likely has aspiration pneumonia with empyema. IR has been consulted for joellen peters.   He very likely will have multiple loculations preventing drainage and may need VATS  · 12/11 feels fine respirations nonlabored has just an occasional air bubble when he coughs chest x-ray with small apical pneumothorax significant fluid remains although it is certainly improved culture consistent with oral [place] to remain on Zosyn WBC count improved we will discontinue vancomycin  · 12/12 doing well at this point no air leak seen on chest tube and chest x-ray is mildly improved. Continue current treatment  · 12/13 drainage has decreased from the chest catheter. Will flush with saline and follow drainage.   Chest x-ray is improved diaphragm can be seen on the right with infiltrate in that area likely representing pneumonia cannot tell how much fluid remains  · 12/15 chest x-ray   · 12/17 on antibiotic no chest pain repeat chest x-ray shows infiltrate atelectasis right midlung and improvement in there right-sided pleural loculated effusion    Rudolph Eduardo MD

## 2021-12-19 NOTE — PROGRESS NOTES
Hospitalist Progress Note    Subjective:   Daily Progress Note: 12/19/2021 8:05 AM    Right chest discomfort with deep inspiration, appears to be pleuritic    Current Facility-Administered Medications   Medication Dose Route Frequency    LORazepam (ATIVAN) tablet 2 mg  2 mg Oral Q4H PRN    nicotine (NICODERM CQ) 21 mg/24 hr patch 1 Patch  1 Patch TransDERmal DAILY    albuterol (PROVENTIL HFA, VENTOLIN HFA, PROAIR HFA) inhaler 2 Puff  2 Puff Inhalation Q6H PRN    budesonide-formoteroL (SYMBICORT) 160-4.5 mcg/actuation HFA inhaler 2 Puff  2 Puff Inhalation BID PRN    oxyCODONE IR (ROXICODONE) tablet 5 mg  5 mg Oral Q4H PRN    amLODIPine (NORVASC) tablet 5 mg  5 mg Oral DAILY    sodium chloride (NS) flush 5-40 mL  5-40 mL IntraVENous Q8H    sodium chloride (NS) flush 5-40 mL  5-40 mL IntraVENous PRN    acetaminophen (TYLENOL) tablet 650 mg  650 mg Oral Q6H PRN    Or    acetaminophen (TYLENOL) suppository 650 mg  650 mg Rectal Q6H PRN    polyethylene glycol (MIRALAX) packet 17 g  17 g Oral DAILY PRN    ondansetron (ZOFRAN ODT) tablet 4 mg  4 mg Oral Q8H PRN    Or    ondansetron (ZOFRAN) injection 4 mg  4 mg IntraVENous Q6H PRN    enoxaparin (LOVENOX) injection 40 mg  40 mg SubCUTAneous DAILY    piperacillin-tazobactam (ZOSYN) 3.375 g in 0.9% sodium chloride (MBP/ADV) 100 mL MBP  3.375 g IntraVENous Q8H    influenza vaccine 2021-22 (6 mos+)(PF) (FLUARIX/FLULAVAL/FLUZONE QUAD) injection 0.5 mL  1 Each IntraMUSCular PRIOR TO DISCHARGE    thiamine mononitrate (B-1) tablet 100 mg  100 mg Oral DAILY        Review of Systems  Review of Systems   Constitutional: Negative for chills, fever and malaise/fatigue. HENT: Negative. Respiratory: Negative for cough and shortness of breath. Cardiovascular:        Right pleuritic chest pain   Gastrointestinal: Negative for abdominal pain, nausea and vomiting. Genitourinary: Negative. Musculoskeletal: Negative. Neurological: Negative. Psychiatric/Behavioral: Negative. Objective:     Visit Vitals  /81   Pulse 77   Temp 99 °F (37.2 °C)   Resp 18   Ht 5' 11\" (1.803 m)   Wt 74.5 kg (164 lb 3.9 oz)   SpO2 96%   BMI 22.91 kg/m²    O2 Flow Rate (L/min): 1.5 l/min O2 Device: None (Room air)    Temp (24hrs), Av.1 °F (37.3 °C), Min:98.7 °F (37.1 °C), Max:99.6 °F (37.6 °C)      No intake/output data recorded.  1901 -  0700  In: -   Out: 20 [Drains:20]    No results found for this or any previous visit (from the past 24 hour(s)). CT CHEST WO CONT   Final Result   Complex findings in the right hemithorax as above, question related to   infection. Small left pleural effusion and mild left basilar atelectasis. Right hilar and mild mediastinal adenopathy is nonspecific, may be reactive. Inferior         XR CHEST PORT   Final Result   1. There is residual airspace disease versus atelectasis within the right mid   to lower lung zones without short-term interval changes. 2.  There has been partial evacuation of a  right-sided empyema with moderate   residual areas of loculated pleural fluid and gas without short-term interval   changes. 3.  On recent CT imaging from 2021, there was demonstrated a massive right   pleural effusion as well as tree-in-bud nodular densities within the left lung. These findings can be associated with tuberculosis and nontuberculous   mycobacterial infections. Please correlate clinically. XR CHEST PORT   Final Result      XR CHEST PORT   Final Result      XR CHEST PORT   Final Result      XR CHEST PORT   Final Result      IR THORACENTESIS/INSERT CHEST TUBE   Final Result   Ultrasound of the right upper back was performed with images stored in PACS,   demonstrating heterogeneous collection most consistent with emphysema. Successful US-guided 8.5 percutaneous drainage catheter placement into right   empyema.        Plan:   Continue flushing the catheter using 10 ml sterile normal saline and record the   output at least once a day. Consider catheter removal if there is less than 20 mL output over 24 hours. XR CHEST PORT    (Results Pending)        PHYSICAL EXAM:    Physical Exam  Vitals reviewed. Constitutional:       General: He is not in acute distress. Appearance: He is not ill-appearing. HENT:      Head: Normocephalic and atraumatic. Mouth/Throat:      Mouth: Mucous membranes are moist.      Pharynx: Oropharynx is clear. Eyes:      Conjunctiva/sclera: Conjunctivae normal.   Cardiovascular:      Rate and Rhythm: Normal rate and regular rhythm. Heart sounds: Normal heart sounds. Pulmonary:      Effort: Pulmonary effort is normal.      Comments: Diminished breath sounds right lower base. Left lung clear to auscultation  Abdominal:      General: Abdomen is flat. Bowel sounds are normal.      Palpations: Abdomen is soft. Musculoskeletal:         General: Normal range of motion. Cervical back: Normal range of motion and neck supple. Skin:     General: Skin is warm. Neurological:      General: No focal deficit present. Mental Status: He is alert and oriented to person, place, and time. Mental status is at baseline. Psychiatric:         Mood and Affect: Mood normal.          Data Review    No results found for this or any previous visit (from the past 24 hour(s)). Assessment/Plan:     Active Problems:    Pleural effusion (12/8/2021)      This a 76-year-old male admitted on 12/8/2021 with a history of a hypertension and asthma who was transferred from [facility] due to worsening shortness of breath and productive cough. Patient with leukocytosis and a CT scan of the chest showing a large multiloculated right pleural effusion consistent with an empyema. There was also a left apical nodule and tree-in-bud appearance with multiple nodules in the left lower lobe.   Patient with acute respiratory failure requiring supplemental oxygenation. Patient was found to be a thoracentesis to have Streptococcus angiosis. Infectious disease consult, Dr. Tho Mcnally currently treating with Zosyn. Decortication planned for 12/21/2021 by Dr. Elma Lamb. Patient with an indwelling chest tube and purulent drainage noted from the right chest.  AFB smear negative, Quantiferon pending as well as acid-fast bacilli culture. Blood cultures have been negative. Pulmonary consultation, Dr. Nolan Weeks:    1. Right multiloculated pleural effusion consistent with empyema  Cardiothoracic surgery and decortication pending 12/21/2021  Continue Zosyn  ID consultation  Pulmonary consultation  Leukocytosis stable    2. Sepsis  Secondary #1    3. History of alcohol and tobacco abuse  Continue Ativan as needed  Nicotine patch    4.  Essential hypertension  Continue Norvasc    CODE STATUS: Full code    DVT prophylaxis: Lovenox  Ulcer prophylaxis: Not indicated    Discharge barriers: Decortication pending 12/21/2021    Care Plan discussed with: Patient/Family    Total time spent with patient: >35 minutes.

## 2021-12-19 NOTE — PROGRESS NOTES
Infectious Diseases Progress Note  Cory Patino MD  [phone]  Date:2021       Room:[facility]  Patient Name:Jason Blanchard     YOB: 1970     Age:51 y.o. 51M with history of hypertension, bronchitis, asthma. Long history of smoking. He reports prior vaccinations against coronavirus.     21 admitted to [facility] from [facility] after he was found to have a pleural effusion. He presents with two weeks of productive cough, shortness of breath, fevers, chills, generalized weakness. During the ED course underwent CT imaging which revealed large multiloculated right pleural effusion.     21 CT Chest  IMPRESSION  Large multiloculated right pleural effusion with subsequent near complete  atelectasis of the right lung. Therapeutic and diagnostic thoracentesis  recommended. Tree-in-bud centrilobular nodules present throughout the left lower lobe with  larger nodular airspace opacity apical posterior segment left upper lobe. Findings consistent with bronchiolitis/bronchopneumonia. The larger airspace  nodule in the apical posterior segment left upper lobe require surveillance.     21 underwent IR thoracentesis  IMPRESSION  Ultrasound of the right upper back was performed with images stored in PACS,  demonstrating heterogeneous collection most consistent with emphysema. Successful US-guided 8.5 percutaneous drainage catheter placement into right  empyema.      Pleural cultures reveal Streptococcus anginosus and I have been asked to see him in consultation.     Subjective    Subjective:  Symptoms:  Stable. Review of Systems   All other systems reviewed and are negative.     Objective         Vitals Last 24 Hours:  TEMPERATURE:  Temp  Av.2 °F (37.3 °C)  Min: 98.7 °F (37.1 °C)  Max: 99.6 °F (37.6 °C)  RESPIRATIONS RANGE: Resp  Av.5  Min: 16  Max: 18  PULSE OXIMETRY RANGE: SpO2  Av.8 %  Min: 93 %  Max: 100 %  PULSE RANGE: Pulse  Av.5  Min: 77  Max: 84  BLOOD PRESSURE RANGE: Systolic (64GJL), CGN:446 , Min:118 , ZUK:391   ; Diastolic (66AGL), PGI:07, Min:65, Max:81    I/O (24Hr): Intake/Output Summary (Last 24 hours) at 12/19/2021 1243  Last data filed at 12/19/2021 1209  Gross per 24 hour   Intake 600 ml   Output 600 ml   Net 0 ml     Objective:  General Appearance:  Comfortable. Vital signs: (most recent): Blood pressure 118/65, pulse 83, temperature 99.3 °F (37.4 °C), resp. rate 16, height 5' 11\" (1.803 m), weight 74.5 kg (164 lb 3.9 oz), SpO2 100 %. Vital signs are normal.    HEENT: Normal HEENT exam.    Lungs:  Normal effort and normal respiratory rate. Heart: Normal rate. Regular rhythm. Abdomen: Abdomen is soft. Bowel sounds are normal.     Extremities: Normal range of motion. Pulses: Distal pulses are intact. Neurological: Patient is alert. Pupils:  Pupils are equal, round, and reactive to light. Skin:  Warm and dry. Labs/Imaging/Diagnostics    Labs:  CBC:  Recent Labs     12/17/21  0806   WBC 16.6*   RBC 4.40   HGB 12.2   HCT 36.4*   MCV 82.7   RDW 12.8   *     CHEMISTRIES:  No results for input(s): NA, K, CL, CO2, BUN, CA, PHOS, MG in the last 72 hours. No lab exists for component: CREATININE, GLUCOSEPT/INR:No results for input(s): INR, INREXT, INREXT in the last 72 hours. No lab exists for component: PROTIME  APTT:No results for input(s): APTT in the last 72 hours. LIVER PROFILE:No results for input(s): AST, ALT in the last 72 hours. No lab exists for component: [place], ALKPHOS  Lab Results   Component Value Date/Time    ALT (SGPT) 56 12/08/2021 05:34 PM    AST (SGOT) 51 (H) 12/08/2021 05:34 PM    Alk. phosphatase 118 (H) 12/08/2021 05:34 PM    Bilirubin, total 1.0 12/08/2021 05:34 PM       Imaging Last 24 Hours:  No results found. Assessment//Plan   Active Problems:    Pleural effusion (12/8/2021)      Assessment & Plan   51M with history of hypertension, bronchitis, asthma. Long history of smoking.  He reports prior vaccinations against coronavirus.     12/8/21 [facility] from [facility] after he was found to have a pleural effusion. He presents with two weeks of productive cough, shortness of breath, fevers, chills, generalized weakness. During the ED course underwent CT imaging which revealed large multiloculated right pleural effusion.     12/8/21 CT Chest  IMPRESSION  Large multiloculated right pleural effusion with subsequent near complete  atelectasis of the right lung. Therapeutic and diagnostic thoracentesis  recommended. Tree-in-bud centrilobular nodules present throughout the left lower lobe with  larger nodular airspace opacity apical posterior segment left upper lobe. Findings consistent with bronchiolitis/bronchopneumonia. The larger airspace  nodule in the apical posterior segment left upper lobe require surveillance.     12/9/21 underwent IR thoracentesis  IMPRESSION  Ultrasound of the right upper back was performed with images stored in PACS,  demonstrating heterogeneous collection most consistent with emphysema.    Successful US-guided 8.5 percutaneous drainage catheter placement into right  empyema.      Pleural cultures reveal Streptococcus anginosus and I have been asked to see him in consultation.     MICROBIOLOGY     12/8/21            Covid19           Negative  12/8/21            Blood               Negative  12/9/21            Blood               Negative     12/9/21            IR pleural         Heavy Streptococcus anginosus     12/16/21          Quant TB         Pending    12/16/21 Pleural  AFB smear negative, AFB culture pending      Fungal pending     ASSESSMENT AND RECOMMENDATIONS     1) Complex loculated pleural effusion in the setting of advanced aspiration pneumonia.                 Zosyn iv while in hospital   Anticipate VATS decortication 12/21/21                Once stable for discharge, oral Augmentin to complete outpatient course                          Augmentin 875mg po bid through 1/7/22       Electronically signed by Saad Castellanos MD on 12/19/2021 at 12:48 PM

## 2021-12-20 ENCOUNTER — APPOINTMENT (OUTPATIENT)
Dept: GENERAL RADIOLOGY | Age: 51
DRG: 853 | End: 2021-12-20
Attending: THORACIC SURGERY (CARDIOTHORACIC VASCULAR SURGERY)
Payer: COMMERCIAL

## 2021-12-20 ENCOUNTER — ANESTHESIA EVENT (OUTPATIENT)
Dept: SURGERY | Age: 51
DRG: 853 | End: 2021-12-20
Payer: COMMERCIAL

## 2021-12-20 LAB
BASOPHILS # BLD: 0.1 K/UL (ref 0–0.1)
BASOPHILS NFR BLD: 1 % (ref 0–1)
DIFFERENTIAL METHOD BLD: ABNORMAL
EOSINOPHIL # BLD: 0.1 K/UL (ref 0–0.4)
EOSINOPHIL NFR BLD: 1 % (ref 0–7)
ERYTHROCYTE [DISTWIDTH] IN BLOOD BY AUTOMATED COUNT: 12.9 % (ref 11.5–14.5)
GLUCOSE BLD STRIP.AUTO-MCNC: 127 MG/DL (ref 65–117)
GLUCOSE BLD STRIP.AUTO-MCNC: 152 MG/DL (ref 65–117)
GLUCOSE BLD STRIP.AUTO-MCNC: 82 MG/DL (ref 65–117)
HCT VFR BLD AUTO: 37 % (ref 36.6–50.3)
HGB BLD-MCNC: 12.5 G/DL (ref 12.1–17)
IMM GRANULOCYTES # BLD AUTO: 0.1 K/UL (ref 0–0.04)
IMM GRANULOCYTES NFR BLD AUTO: 1 % (ref 0–0.5)
LYMPHOCYTES # BLD: 1.5 K/UL (ref 0.8–3.5)
LYMPHOCYTES NFR BLD: 11 % (ref 12–49)
MCH RBC QN AUTO: 27.9 PG (ref 26–34)
MCHC RBC AUTO-ENTMCNC: 33.8 G/DL (ref 30–36.5)
MCV RBC AUTO: 82.6 FL (ref 80–99)
MONOCYTES # BLD: 0.6 K/UL (ref 0–1)
MONOCYTES NFR BLD: 4 % (ref 5–13)
NEUTS SEG # BLD: 12.1 K/UL (ref 1.8–8)
NEUTS SEG NFR BLD: 82 % (ref 32–75)
NRBC # BLD: 0 K/UL (ref 0–0.01)
NRBC BLD-RTO: 0 PER 100 WBC
PERFORMED BY, TECHID: ABNORMAL
PERFORMED BY, TECHID: ABNORMAL
PERFORMED BY, TECHID: NORMAL
PLATELET # BLD AUTO: 724 K/UL (ref 150–400)
PMV BLD AUTO: 9.3 FL (ref 8.9–12.9)
RBC # BLD AUTO: 4.48 M/UL (ref 4.1–5.7)
WBC # BLD AUTO: 14.5 K/UL (ref 4.1–11.1)

## 2021-12-20 PROCEDURE — 71045 X-RAY EXAM CHEST 1 VIEW: CPT

## 2021-12-20 PROCEDURE — 74011250637 HC RX REV CODE- 250/637: Performed by: NURSE PRACTITIONER

## 2021-12-20 PROCEDURE — 74011250636 HC RX REV CODE- 250/636: Performed by: INTERNAL MEDICINE

## 2021-12-20 PROCEDURE — 74011250637 HC RX REV CODE- 250/637: Performed by: INTERNAL MEDICINE

## 2021-12-20 PROCEDURE — 74011000258 HC RX REV CODE- 258: Performed by: INTERNAL MEDICINE

## 2021-12-20 PROCEDURE — 82962 GLUCOSE BLOOD TEST: CPT

## 2021-12-20 PROCEDURE — 65270000029 HC RM PRIVATE

## 2021-12-20 PROCEDURE — 85025 COMPLETE CBC W/AUTO DIFF WBC: CPT

## 2021-12-20 RX ADMIN — ACETAMINOPHEN 650 MG: 325 TABLET ORAL at 18:46

## 2021-12-20 RX ADMIN — Medication 10 ML: at 21:26

## 2021-12-20 RX ADMIN — PIPERACILLIN SODIUM AND TAZOBACTAM SODIUM 3.38 G: 3; .375 INJECTION, POWDER, LYOPHILIZED, FOR SOLUTION INTRAVENOUS at 09:33

## 2021-12-20 RX ADMIN — THIAMINE HCL TAB 100 MG 100 MG: 100 TAB at 09:31

## 2021-12-20 RX ADMIN — PIPERACILLIN SODIUM AND TAZOBACTAM SODIUM 3.38 G: 3; .375 INJECTION, POWDER, LYOPHILIZED, FOR SOLUTION INTRAVENOUS at 18:39

## 2021-12-20 RX ADMIN — Medication 10 ML: at 01:53

## 2021-12-20 RX ADMIN — OXYCODONE HYDROCHLORIDE 5 MG: 5 TABLET ORAL at 21:26

## 2021-12-20 RX ADMIN — PIPERACILLIN SODIUM AND TAZOBACTAM SODIUM 3.38 G: 3; .375 INJECTION, POWDER, LYOPHILIZED, FOR SOLUTION INTRAVENOUS at 01:53

## 2021-12-20 RX ADMIN — Medication 10 ML: at 14:00

## 2021-12-20 RX ADMIN — Medication 10 ML: at 05:20

## 2021-12-20 RX ADMIN — AMLODIPINE BESYLATE 5 MG: 5 TABLET ORAL at 09:31

## 2021-12-20 RX ADMIN — ENOXAPARIN SODIUM 40 MG: 100 INJECTION SUBCUTANEOUS at 09:31

## 2021-12-20 NOTE — PROGRESS NOTES
CM reviewed chart and spoke to primary physician. Patient is still pending procedure on 12/21/21. CM will continue to follow for possible needs after procedure.

## 2021-12-20 NOTE — PROGRESS NOTES
Pulmonary, Critical Care    Name: Madonna Cheng MRN: [id number]   : 1970 Hospital: [facility]   Date: 2021  Admission date: 2021 Hospital Day: 13       Subjective/Interval History:   Seen on the medical floor awake alert. History is 2 or so weeks of breathing difficulty. Started with severe chest pain pleuritic with chills and sweats he did not take his temperature but felt hot at times. He has had continued worsening presented to the emergency room has a large right pleural effusion. CT scan suggests multiloculated effusion. Leukocytosis up to 34,000 all consistent with empyema. He is a drinker of 1-2 sixpacks a day. Does drink to the point of falling asleep although he states he is never passed out. 12/10 percutaneous drainage catheter placed by IR yesterday with 1600 cc drainage overnight and so far today 700   chest catheter continues to drain he states he feels a little better. Chest x-ray today has small apical pneumothorax with significant amount of remaining pleural fluid   awake alert states he feels better   no specific complaints respirations   for decortication on Tuesday    Hospital Problems  Never Reviewed          Codes Class Noted POA    Pleural effusion ICD-10-CM: J90  ICD-9-CM: 511.9  2021 Unknown              IMPRESSION:   1. Right empyema culture  microaerophilic Streptococcus remains on Zosyn  2. Loculated pleural effusion for decortication tomorrow  3. Aspiration pneumonia  4. Chest tube in place  5. Nodular density left lung probably aspiration pneumonia have to follow to make sure it clears  6. Hyponatremia improved  7. Alcohol abuse  8. COPD exacerbation  Body mass index is 22.91 kg/m². RECOMMENDATIONS/PLAN:   1. Pleural fluid loculated positive for Streptococcus anguosus  2. For decortication tomorrow  3. Very minimal drainage from the chest  4. No wheezing  bronchodilators now inhalers  5.  For OR schedule for decortication on Tuesday  6. Continue IV Zosyn WBC count continues to improve  7. No evidence of developing DTs  8. Continue thiamine  9. Smoking cessation counseling done  10. QuantiFERON test pending           [x] High complexity decision making was performed  [x] See my orders for details      Subjective/Initial History:     I was asked by Lacho Ruiz MD to see Cecelia Chery  a 46 y.o.  male in consultation for a chief complaint of multiloculated right pleural effusion likely empyema      No Known Allergies     MAR reviewed and pertinent medications noted or modified as needed     Current Facility-Administered Medications   Medication    LORazepam (ATIVAN) tablet 2 mg    nicotine (NICODERM CQ) 21 mg/24 hr patch 1 Patch    albuterol (PROVENTIL HFA, VENTOLIN HFA, PROAIR HFA) inhaler 2 Puff    budesonide-formoteroL (SYMBICORT) 160-4.5 mcg/actuation HFA inhaler 2 Puff    oxyCODONE IR (ROXICODONE) tablet 5 mg    amLODIPine (NORVASC) tablet 5 mg    sodium chloride (NS) flush 5-40 mL    sodium chloride (NS) flush 5-40 mL    acetaminophen (TYLENOL) tablet 650 mg    Or    acetaminophen (TYLENOL) suppository 650 mg    polyethylene glycol (MIRALAX) packet 17 g    ondansetron (ZOFRAN ODT) tablet 4 mg    Or    ondansetron (ZOFRAN) injection 4 mg    enoxaparin (LOVENOX) injection 40 mg    piperacillin-tazobactam (ZOSYN) 3.375 g in 0.9% sodium chloride (MBP/ADV) 100 mL MBP    influenza vaccine 2021-22 (6 mos+)(PF) (FLUARIX/FLULAVAL/FLUZONE QUAD) injection 0.5 mL    thiamine mononitrate (B-1) tablet 100 mg      Patient PCP: None  PMH:  has a past medical history of Asthma and Ill-defined condition. PSH:   has a past surgical history that includes ir thoracentesis/insert chest tube (12/9/2021). FHX: family history is not on file. SHX:  reports that he has quit smoking. He has never used smokeless tobacco. He reports current alcohol use. He reports previous drug use.   To me he admits smoking 1 to 2 packs/day ever since he was 8 or 10. He drinks 1-2 sixpacks a day beer     Systemic review:  General states his weight stable has been having chills and fever for the last 2 weeks or more  Eyes no double vision or momentary blindness  ENT no drainage or facial pain  Musculoskeletal no swollen tender joints  Endocrinologic no polyuria polydipsia  Neurologic no seizures or syncope  Gastrointestinal no nausea vomiting acid indigestion. Genitourinary no pain or discomfort on urination  Cardiovascular no history of heart disease or ankle edema he has had the pleuritic chest pain no diaphoresis  Respiratory as mentioned cough some yellow sputum fever chills shortness of breath and pleuritic pain on the right    Objective:     Vital Signs: Telemetry:    normal sinus rhythm Intake/Output:   Visit Vitals  /79   Pulse 79   Temp 99 °F (37.2 °C)   Resp 18   Ht 5' 11\" (1.803 m)   Wt 74.5 kg (164 lb 3.9 oz)   SpO2 97%   BMI 22.91 kg/m²       Temp (24hrs), Av.6 °F (37 °C), Min:98.4 °F (36.9 °C), Max:99 °F (37.2 °C)        O2 Device: None (Room air) O2 Flow Rate (L/min): 1.5 l/min       Wt Readings from Last 4 Encounters:   12/15/21 74.5 kg (164 lb 3.9 oz)   21 73.5 kg (162 lb)          Intake/Output Summary (Last 24 hours) at 2021 0933  Last data filed at 2021 7229  Gross per 24 hour   Intake 600 ml   Output 1600 ml   Net -1000 ml       Last shift:      701 - 1900  In: -   Out: 300 [Urine:300]  Last 3 shifts: 1901 -  0700  In: 600 [P.O.:600]  Out: 1300 [Urine:1200; Drains:100]       Physical Exam:   General:  male; lying in bed no distress no accessory muscle usage  HEENT: NCAT, poor dentition,  Eyes: anicteric; conjunctiva clear extraocular movements intact  Neck: no nodes, no JVD, no accessory MM use.   Chest: no deformity,   Cardiac: Regular rate and rhythm  Lungs: Markedly improved breath sounds on the right present anteriorly and upper lung posteriorly and developing some in the right base now, left side fairly clear  Abd: Thin soft positive bowel sounds  Ext: no edema; no joint swelling; No clubbing  : clear urine  Neuro: Awake alert calm speech is clear moves all 4 extremities  Psych- no agitation, oriented to person;   Skin: warm, dry, no cyanosis;   Pulses: Brachial radial pulses intact  Capillary: Normal capillary refill    Labs:    Recent Labs     12/20/21  0732   WBC 14.5*   HGB 12.5   *     No results for input(s): NA, K, CL, CO2, GLU, BUN, CREA, CA, MG, PHOS, LAC, ALB, TBIL, ALT, AML, LPSE in the last 72 hours. No lab exists for component: SGOT. Pleural fluid WBC count 47,840 with 56% polys 32% lymphs pleural fluid protein 0.8 glucose 4  Pleural culture with  microaerophilic Streptococcus    Lab Results   Component Value Date/Time    Culture result: Heavy Streptococcus anginosus 12/09/2021 11:30 AM    Culture result: No growth 6 days 12/09/2021 07:42 AM    Culture result: No growth 6 days 12/08/2021 08:45 PM     Imaging:    CXR Results  (Last 48 hours)               12/12/21 0854  XR CHEST PORT Final result    Narrative:  Chest single view. Comparison single view chest December 11, 2021. Similar appearance for the right hemithorax. Predominant right basilar airspace opacity, similar volume. Unchanged hydropneumothorax. Similar degree apical pleural separation. Left lung aerated. Cardiac and mediastinal structures unchanged. To me the x-ray is improved you can start to see the right hemidiaphragm now implying further decrease in effusion in the apical pneumothorax is minimally improved       12/11/21 0752  XR CHEST PORT Final result    Narrative:  Chest single view. Comparison single view chest December 8, 2021. Right-sided hydropneumothorax. Estimated moderate volume pleural fluid. 1.1 cm   apical pleural separation. Left lung aerated. Cardiac and mediastinal structures   unchanged. Results from [facility] encounter on 12/08/21    XR CHEST PORT    Narrative  Chest, frontal view, 12/20/2021    History: Empyema. Comparison: Including CT chest 12/17/2021. Findings: The cardiac silhouette is stable. The lung volumes are not  significantly changed. There is indistinctness of the pulmonary vasculature and  mild hydrostatic edema is possible. Pleural drain at the right base is again  noted. Loculated right pleural effusion with air and associated airspace  consolidation are not significantly changed. Small left pleural effusion and  left basilar atelectasis persist.  No pneumothorax is identified. The osseous  structures are stable. Impression  No significant interval change. XR CHEST PORT    Narrative  Chest single view. Comparison single view chest December 13, 2021. Unchanged appearance right hemithorax. Similar hydropneumothorax. 2.1 cm apical pleural separation. Same patchy reticular markings RML/RLL lung. Left lung aerated. Cardiac and mediastinal structures unchanged. XR CHEST PORT    Narrative  Reason for Visit:  HISTORY: Empyema. TECHNIQUE: Portable radiograph the chest dated 12/16/2021 obtained at 7:52 AM.  COMPARISON: Portable radiograph the chest dated 12/15/2021 obtained at 1:46 PM.  LIMITATIONS: None. TUBES/LINES:  There is a small to medium caliber pigtail drainage catheter  within the right diaphragmatic pleura. HEART AND PULMONARY VESSELS: Normal.    LUNG PARENCHYMA: There is airspace disease versus atelectasis within the right  mid to lower lung zones without short-term interval change. PLEURA: There is a moderate volume of loculated fluid and air demonstrated  within the right apical, lateral, and diaphragmatic pleura. The regions of  residual pleural fluid and pleural gas are unchanged. MEDIASTINUM: Normal.    BONE/SOFT TISSUES: This examination is negative for acute osseous abnormalities. Impression  1.   There is residual airspace disease versus atelectasis within the right mid  to lower lung zones without short-term interval changes. 2.  There has been partial evacuation of a  right-sided empyema with moderate  residual areas of loculated pleural fluid and gas without short-term interval  changes. 3.  On recent CT imaging from 12/8/2021, there was demonstrated a massive right  pleural effusion as well as tree-in-bud nodular densities within the left lung. These findings can be associated with tuberculosis and nontuberculous  mycobacterial infections. Please correlate clinically. Results from [place] encounter on 12/08/21    CT CHEST WO CONT    Narrative  Noncontrast exam.  Comparison chest radiograph yesterday. Dose Reduction:  All CT scans at this facility are performed using dose reduction optimization  techniques as appropriate to a performed exam including the following: Automated  exposure control, adjustments of the mA and/or kV according to patient size, or  use of iterative reconstruction technique. 6 x 6 x 3.5 cm loculated collection with an air-fluid level in the right apex. Complex densities in the right mid and lower lower lung including thick-walled  lesion containing central well-defined gas lucencies, potentially loculated 6 cm  fluid collection in the lateral right midlung, and small posterior inferior  fluid containing a pigtail drain and multiple gas bubbles in a pattern  suggesting that the fluid is highly viscous. Multiple air bronchograms are noted  within these complex densities. Small left pleural effusion and left basilar atelectasis. No anterior pneumothorax. Right hilar and mediastinal adenopathy, likely reactive. Atherosclerosis, including coronary artery calcification. Impression  Complex findings in the right hemithorax as above, question related to  infection. Small left pleural effusion and mild left basilar atelectasis.   Right hilar and mild mediastinal adenopathy is nonspecific, may be reactive. Inferior      · 12/9 discussion 2-week history of pleuritic pain cough chills sweats fever and a drinker who will drink to the point of falling asleep most likely has aspiration pneumonia with empyema. IR has been consulted for joellen peters. He very likely will have multiple loculations preventing drainage and may need VATS  · 12/11 feels fine respirations nonlabored has just an occasional air bubble when he coughs chest x-ray with small apical pneumothorax significant fluid remains although it is certainly improved culture consistent with oral portillo to remain on Zosyn WBC count improved we will discontinue vancomycin  · 12/12 doing well at this point no air leak seen on chest tube and chest x-ray is mildly improved. Continue current treatment  · 12/13 drainage has decreased from the chest catheter. Will flush with saline and follow drainage.   Chest x-ray is improved diaphragm can be seen on the right with infiltrate in that area likely representing pneumonia cannot tell how much fluid remains  · 12/15 chest x-ray   · 12/17 on antibiotic no chest pain repeat chest x-ray shows infiltrate atelectasis right midlung and improvement in there right-sided pleural loculated effusion    Pallavi Crow MD

## 2021-12-20 NOTE — PROGRESS NOTES
I have reviewed patients chart over the last week and his CXR and CT scan and labs. Plan right sided Decortication Tuesday Morning at 7:30.  For complex right sided empyema

## 2021-12-20 NOTE — PROGRESS NOTES
Infectious Diseases Progress Note  Cory Patino MD  [phone]  Date:2021       Room:[facility]  Patient Name:Jason Blanchard     YOB: 1970     Age:51 y.o. 51M with history of hypertension, bronchitis, asthma. Long history of smoking. He reports prior vaccinations against coronavirus.     21 admitted to [facility] from [facility] after he was found to have a pleural effusion. He presents with two weeks of productive cough, shortness of breath, fevers, chills, generalized weakness. During the ED course underwent CT imaging which revealed large multiloculated right pleural effusion.     21 CT Chest  IMPRESSION  Large multiloculated right pleural effusion with subsequent near complete  atelectasis of the right lung. Therapeutic and diagnostic thoracentesis  recommended. Tree-in-bud centrilobular nodules present throughout the left lower lobe with  larger nodular airspace opacity apical posterior segment left upper lobe. Findings consistent with bronchiolitis/bronchopneumonia. The larger airspace  nodule in the apical posterior segment left upper lobe require surveillance.     21 underwent IR thoracentesis  IMPRESSION  Ultrasound of the right upper back was performed with images stored in PACS,  demonstrating heterogeneous collection most consistent with emphysema. Successful US-guided 8.5 percutaneous drainage catheter placement into right  empyema.      Pleural cultures reveal Streptococcus anginosus and I have been asked to see him in consultation.     Subjective    Subjective:  Symptoms:  Stable. Review of Systems   All other systems reviewed and are negative.     Objective         Vitals Last 24 Hours:  TEMPERATURE:  Temp  Av.6 °F (37 °C)  Min: 98.4 °F (36.9 °C)  Max: 99 °F (37.2 °C)  RESPIRATIONS RANGE: Resp  Av.3  Min: 16  Max: 18  PULSE OXIMETRY RANGE: SpO2  Av %  Min: 96 %  Max: 98 %  PULSE RANGE: Pulse  Av.3  Min: 69  Max: 80  BLOOD PRESSURE RANGE: Systolic (36XKS), OAL:645 , Min:111 , TCE:627   ; Diastolic (81EJF), ETZ:85, Min:67, Max:79    I/O (24Hr): Intake/Output Summary (Last 24 hours) at 12/20/2021 1051  Last data filed at 12/20/2021 0724  Gross per 24 hour   Intake 600 ml   Output 1600 ml   Net -1000 ml     Objective:  General Appearance:  Comfortable. Vital signs: (most recent): Blood pressure 137/79, pulse 79, temperature 99 °F (37.2 °C), resp. rate 18, height 5' 11\" (1.803 m), weight 74.5 kg (164 lb 3.9 oz), SpO2 97 %. Vital signs are normal.    HEENT: Normal HEENT exam.    Lungs:  Normal effort and normal respiratory rate. Heart: Normal rate. Regular rhythm. Abdomen: Abdomen is soft. Bowel sounds are normal.     Extremities: Normal range of motion. Pulses: Distal pulses are intact. Neurological: Patient is alert. Pupils:  Pupils are equal, round, and reactive to light. Skin:  Warm and dry. Labs/Imaging/Diagnostics    Labs:  CBC:  Recent Labs     12/20/21  0732   WBC 14.5*   RBC 4.48   HGB 12.5   HCT 37.0   MCV 82.6   RDW 12.9   *     CHEMISTRIES:  No results for input(s): NA, K, CL, CO2, BUN, CA, PHOS, MG in the last 72 hours. No lab exists for component: CREATININE, GLUCOSEPT/INR:No results for input(s): INR, INREXT, INREXT in the last 72 hours. No lab exists for component: PROTIME  APTT:No results for input(s): APTT in the last 72 hours. LIVER PROFILE:No results for input(s): AST, ALT in the last 72 hours. No lab exists for component: Mei Holland ALKPHOS  Lab Results   Component Value Date/Time    ALT (SGPT) 56 12/08/2021 05:34 PM    AST (SGOT) 51 (H) 12/08/2021 05:34 PM    Alk. phosphatase 118 (H) 12/08/2021 05:34 PM    Bilirubin, total 1.0 12/08/2021 05:34 PM       Imaging Last 24 Hours:  XR CHEST PORT    Result Date: 12/20/2021  Chest, frontal view, 12/20/2021 History: Empyema. Comparison: Including CT chest 12/17/2021. Findings: The cardiac silhouette is stable.   The lung volumes are not significantly changed. There is indistinctness of the pulmonary vasculature and mild hydrostatic edema is possible. Pleural drain at the right base is again noted. Loculated right pleural effusion with air and associated airspace consolidation are not significantly changed. Small left pleural effusion and left basilar atelectasis persist.  No pneumothorax is identified. The osseous structures are stable. No significant interval change. Assessment//Plan   Active Problems:    Pleural effusion (12/8/2021)      Assessment & Plan   51M with history of hypertension, bronchitis, asthma. Long history of smoking. He reports prior vaccinations against coronavirus.     12/8/21 admitted to [facility] from [facility] after he was found to have a pleural effusion. He presents with two weeks of productive cough, shortness of breath, fevers, chills, generalized weakness. During the ED course underwent CT imaging which revealed large multiloculated right pleural effusion.     12/8/21 CT Chest  IMPRESSION  Large multiloculated right pleural effusion with subsequent near complete  atelectasis of the right lung. Therapeutic and diagnostic thoracentesis  recommended. Tree-in-bud centrilobular nodules present throughout the left lower lobe with  larger nodular airspace opacity apical posterior segment left upper lobe. Findings consistent with bronchiolitis/bronchopneumonia. The larger airspace  nodule in the apical posterior segment left upper lobe require surveillance.     12/9/21 underwent IR thoracentesis  IMPRESSION  Ultrasound of the right upper back was performed with images stored in PACS,  demonstrating heterogeneous collection most consistent with emphysema.    Successful US-guided 8.5 percutaneous drainage catheter placement into right  empyema.      Pleural cultures reveal Streptococcus anginosus and I have been asked to see him in consultation.     MICROBIOLOGY     12/8/21            Covid19           Negative  12/8/21 Blood               Negative  12/9/21            Blood               Negative     12/9/21            IR pleural         Heavy Streptococcus anginosus     12/16/21          Quant TB         Negative    12/16/21 Pleural  AFB smear negative, AFB culture pending      Fungal pending     ASSESSMENT AND RECOMMENDATIONS     1) Complex loculated pleural effusion in the setting of advanced aspiration pneumonia.                 Zosyn iv while in hospital   Anticipate VATS decortication 12/21/21                Once stable for discharge, oral Augmentin to complete outpatient course                          Augmentin 875mg po bid through 1/7/22       Electronically signed by Marcia Pike MD on 12/20/2021 at 12:48 PM

## 2021-12-20 NOTE — PROGRESS NOTES
Hospitalist Progress Note    Subjective:   Daily Progress Note: 12/20/2021 8:05 AM    Right chest discomfort with deep inspiration, appears to be pleuritic    Current Facility-Administered Medications   Medication Dose Route Frequency    LORazepam (ATIVAN) tablet 2 mg  2 mg Oral Q4H PRN    nicotine (NICODERM CQ) 21 mg/24 hr patch 1 Patch  1 Patch TransDERmal DAILY    albuterol (PROVENTIL HFA, VENTOLIN HFA, PROAIR HFA) inhaler 2 Puff  2 Puff Inhalation Q6H PRN    budesonide-formoteroL (SYMBICORT) 160-4.5 mcg/actuation HFA inhaler 2 Puff  2 Puff Inhalation BID PRN    oxyCODONE IR (ROXICODONE) tablet 5 mg  5 mg Oral Q4H PRN    amLODIPine (NORVASC) tablet 5 mg  5 mg Oral DAILY    sodium chloride (NS) flush 5-40 mL  5-40 mL IntraVENous Q8H    sodium chloride (NS) flush 5-40 mL  5-40 mL IntraVENous PRN    acetaminophen (TYLENOL) tablet 650 mg  650 mg Oral Q6H PRN    polyethylene glycol (MIRALAX) packet 17 g  17 g Oral DAILY PRN    ondansetron (ZOFRAN ODT) tablet 4 mg  4 mg Oral Q8H PRN    piperacillin-tazobactam (ZOSYN) 3.375 g in 0.9% sodium chloride (MBP/ADV) 100 mL MBP  3.375 g IntraVENous Q8H    influenza vaccine 2021-22 (6 mos+)(PF) (FLUARIX/FLULAVAL/FLUZONE QUAD) injection 0.5 mL  1 Each IntraMUSCular PRIOR TO DISCHARGE    thiamine mononitrate (B-1) tablet 100 mg  100 mg Oral DAILY        Review of Systems  Review of Systems   Constitutional: Negative for chills, fever and malaise/fatigue. HENT: Negative. Respiratory: Negative for cough and shortness of breath. Cardiovascular:        Right pleuritic chest pain   Gastrointestinal: Negative for abdominal pain, nausea and vomiting. Genitourinary: Negative. Musculoskeletal: Negative. Neurological: Negative. Psychiatric/Behavioral: Negative.              Objective:     Visit Vitals  /74 (BP 1 Location: Right lower arm, BP Patient Position: Supine)   Pulse 90   Temp 99 °F (37.2 °C)   Resp 18   Ht 5' 11\" (1.803 m)   Wt 74.5 kg (164 lb 3.9 oz)   SpO2 97%   BMI 22.91 kg/m²    O2 Flow Rate (L/min): 1.5 l/min O2 Device: None (Room air)    Temp (24hrs), Av.7 °F (37.1 °C), Min:98.4 °F (36.9 °C), Max:99 °F (37.2 °C)      701 - 1900  In: -   Out: 300 [Urine:300]  1901 -  0700  In: 600 [P.O.:600]  Out: 1300 [Urine:1200; Drains:100]    Recent Results (from the past 24 hour(s))   TYPE & SCREEN    Collection Time: 21  7:41 PM   Result Value Ref Range    Crossmatch Expiration 2021,2359     ABO/Rh(D) Mitchael Asa Positive     Antibody screen Negative    CBC WITH AUTOMATED DIFF    Collection Time: 21  7:32 AM   Result Value Ref Range    WBC 14.5 (H) 4.1 - 11.1 K/uL    RBC 4.48 4.10 - 5.70 M/uL    HGB 12.5 12.1 - 17.0 g/dL    HCT 37.0 36.6 - 50.3 %    MCV 82.6 80.0 - 99.0 FL    MCH 27.9 26.0 - 34.0 PG    MCHC 33.8 30.0 - 36.5 g/dL    RDW 12.9 11.5 - 14.5 %    PLATELET 185 (H) 157 - 400 K/uL    MPV 9.3 8.9 - 12.9 FL    NRBC 0.0 0.0  WBC    ABSOLUTE NRBC 0.00 0.00 - 0.01 K/uL    NEUTROPHILS 82 (H) 32 - 75 %    LYMPHOCYTES 11 (L) 12 - 49 %    MONOCYTES 4 (L) 5 - 13 %    EOSINOPHILS 1 0 - 7 %    BASOPHILS 1 0 - 1 %    IMMATURE GRANULOCYTES 1 (H) 0 - 0.5 %    ABS. NEUTROPHILS 12.1 (H) 1.8 - 8.0 K/UL    ABS. LYMPHOCYTES 1.5 0.8 - 3.5 K/UL    ABS. MONOCYTES 0.6 0.0 - 1.0 K/UL    ABS. EOSINOPHILS 0.1 0.0 - 0.4 K/UL    ABS. BASOPHILS 0.1 0.0 - 0.1 K/UL    ABS. IMM. GRANS. 0.1 (H) 0.00 - 0.04 K/UL    DF AUTOMATED     GLUCOSE, POC    Collection Time: 21  7:48 AM   Result Value Ref Range    Glucose (POC) 82 65 - 117 mg/dL    Performed by Elsie Stahl, POC    Collection Time: 21 11:48 AM   Result Value Ref Range    Glucose (POC) 152 (H) 65 - 117 mg/dL    Performed by Chito Hancock         XR CHEST PORT   Final Result   No significant interval change. CT CHEST WO CONT   Final Result   Complex findings in the right hemithorax as above, question related to   infection.    Small left pleural effusion and mild left basilar atelectasis. Right hilar and mild mediastinal adenopathy is nonspecific, may be reactive. Inferior         XR CHEST PORT   Final Result   1. There is residual airspace disease versus atelectasis within the right mid   to lower lung zones without short-term interval changes. 2.  There has been partial evacuation of a  right-sided empyema with moderate   residual areas of loculated pleural fluid and gas without short-term interval   changes. 3.  On recent CT imaging from 12/8/2021, there was demonstrated a massive right   pleural effusion as well as tree-in-bud nodular densities within the left lung. These findings can be associated with tuberculosis and nontuberculous   mycobacterial infections. Please correlate clinically. XR CHEST PORT   Final Result      XR CHEST PORT   Final Result      XR CHEST PORT   Final Result      XR CHEST PORT   Final Result      IR THORACENTESIS/INSERT CHEST TUBE   Final Result   Ultrasound of the right upper back was performed with images stored in PACS,   demonstrating heterogeneous collection most consistent with emphysema. Successful US-guided 8.5 percutaneous drainage catheter placement into right   empyema. Plan:   Continue flushing the catheter using 10 ml sterile normal saline and record the   output at least once a day. Consider catheter removal if there is less than 20 mL output over 24 hours. PHYSICAL EXAM:    Physical Exam  Vitals reviewed. Constitutional:       General: He is not in acute distress. Appearance: He is not ill-appearing. HENT:      Head: Normocephalic and atraumatic. Mouth/Throat:      Mouth: Mucous membranes are moist.      Pharynx: Oropharynx is clear. Eyes:      Conjunctiva/sclera: Conjunctivae normal.   Cardiovascular:      Rate and Rhythm: Normal rate and regular rhythm. Heart sounds: Normal heart sounds.    Pulmonary:      Effort: Pulmonary effort is normal. Comments: Diminished breath sounds right lower base. Left lung clear to auscultation  Abdominal:      General: Abdomen is flat. Bowel sounds are normal.      Palpations: Abdomen is soft. Musculoskeletal:         General: Normal range of motion. Cervical back: Normal range of motion and neck supple. Skin:     General: Skin is warm. Neurological:      General: No focal deficit present. Mental Status: He is alert and oriented to person, place, and time. Mental status is at baseline. Psychiatric:         Mood and Affect: Mood normal.          Data Review    Recent Results (from the past 24 hour(s))   TYPE & SCREEN    Collection Time: 12/19/21  7:41 PM   Result Value Ref Range    Crossmatch Expiration 12/22/2021,2359     ABO/Rh(D) O Positive     Antibody screen Negative    CBC WITH AUTOMATED DIFF    Collection Time: 12/20/21  7:32 AM   Result Value Ref Range    WBC 14.5 (H) 4.1 - 11.1 K/uL    RBC 4.48 4.10 - 5.70 M/uL    HGB 12.5 12.1 - 17.0 g/dL    HCT 37.0 36.6 - 50.3 %    MCV 82.6 80.0 - 99.0 FL    MCH 27.9 26.0 - 34.0 PG    MCHC 33.8 30.0 - 36.5 g/dL    RDW 12.9 11.5 - 14.5 %    PLATELET 350 (H) 936 - 400 K/uL    MPV 9.3 8.9 - 12.9 FL    NRBC 0.0 0.0  WBC    ABSOLUTE NRBC 0.00 0.00 - 0.01 K/uL    NEUTROPHILS 82 (H) 32 - 75 %    LYMPHOCYTES 11 (L) 12 - 49 %    MONOCYTES 4 (L) 5 - 13 %    EOSINOPHILS 1 0 - 7 %    BASOPHILS 1 0 - 1 %    IMMATURE GRANULOCYTES 1 (H) 0 - 0.5 %    ABS. NEUTROPHILS 12.1 (H) 1.8 - 8.0 K/UL    ABS. LYMPHOCYTES 1.5 0.8 - 3.5 K/UL    ABS. MONOCYTES 0.6 0.0 - 1.0 K/UL    ABS. EOSINOPHILS 0.1 0.0 - 0.4 K/UL    ABS. BASOPHILS 0.1 0.0 - 0.1 K/UL    ABS. IMM.  GRANS. 0.1 (H) 0.00 - 0.04 K/UL    DF AUTOMATED     GLUCOSE, POC    Collection Time: 12/20/21  7:48 AM   Result Value Ref Range    Glucose (POC) 82 65 - 117 mg/dL    Performed by Karla Graves, POC    Collection Time: 12/20/21 11:48 AM   Result Value Ref Range    Glucose (POC) 152 (H) 65 - 117 mg/dL Performed by Сергей Salcedo         Assessment/Plan:     Active Problems:    Pleural effusion (12/8/2021)      This a 68-year-old male admitted on 12/8/2021 with a history of a hypertension and asthma who was transferred from [facility] due to worsening shortness of breath and productive cough. Patient with leukocytosis and a CT scan of the chest showing a large multiloculated right pleural effusion consistent with an empyema. There was also a left apical nodule and tree-in-bud appearance with multiple nodules in the left lower lobe. Patient with acute respiratory failure requiring supplemental oxygenation. Patient was found to be a thoracentesis to have Streptococcus angiosis. Infectious disease consult, Dr. Salvador Montanez currently treating with Zosyn. Decortication planned for 12/21/2021 by Dr. Ambrosio Mata. Patient with an indwelling chest tube and purulent drainage noted from the right chest.  AFB smear negative, Quantiferon pending as well as acid-fast bacilli culture. Blood cultures have been negative. Pulmonary consultation, Dr. Osei Nevarez:    1. Right multiloculated pleural effusion consistent with empyema  Cardiothoracic surgery and decortication pending 12/21/2021  Continue Zosyn  ID consultation  Pulmonary consultation  Leukocytosis stable    2. Sepsis  Secondary #1    3. History of alcohol and tobacco abuse  Continue Ativan as needed  Nicotine patch    4.  Essential hypertension  Continue Norvasc    CODE STATUS: Full code    DVT prophylaxis: Lovenox  Ulcer prophylaxis: Not indicated    Discharge barriers: Decortication pending 12/21/2021    Care Plan discussed with: Patient/Family    Total time spent with patient: >35 minutes.

## 2021-12-20 NOTE — ANESTHESIA PREPROCEDURE EVALUATION
Relevant Problems   No relevant active problems       Anesthetic History   No history of anesthetic complications            Review of Systems / Medical History  Patient summary reviewed, nursing notes reviewed and pertinent labs reviewed    Pulmonary          Shortness of breath  Asthma     Comments: PLEURAL EFFUSION. Cough. BRONCHITIS. SNORING. Neuro/Psych   Within defined limits           Cardiovascular    Hypertension              Exercise tolerance: >4 METS     GI/Hepatic/Renal               Comments: Elevated ETOH LEVELS ON ADMISSION. Endo/Other             Other Findings   Comments: CT SCAN (12-17-21) IMPRESSION  Complex findings in the right hemithorax as above, question related to  infection. Small left pleural effusion and mild left basilar atelectasis. Right hilar and mild mediastinal adenopathy is nonspecific, may be reactive. Inferior. Results for Josee Bobo (MRN [id number]) as of 12/20/2021 21:52    12/8/2021 17:34  INR: 1.2 (H)  Prothrombin time: 12.0 (H)  D-dimer: 2.58 (H)    LOVENOX: LAST DOSE ON 12-20-21 @ 9:30AM.          Physical Exam    Airway  Mallampati: I  TM Distance: > 6 cm  Neck ROM: normal range of motion   Mouth opening: Normal     Cardiovascular    Rhythm: regular  Rate: normal      Pertinent negatives: No murmur   Dental    Dentition: Poor dentition     Pulmonary    :bilateral  Decreased breath sounds: right        Pertinent negatives: Rhonchi: FEW. Abdominal  GI exam deferred       Other Findings            Anesthetic Plan    ASA: 4  Anesthesia type: general and epidural  DOUBLE LUMEN TUBE.    Monitoring Plan: Arterial line      Induction: Intravenous  Anesthetic plan and risks discussed with: Patient      PT ADVISED TO TAKE ALBUTEROL PUFFs X 2 BEFORE HEADING TO OPERATING Rm AND BRING WITH HIM ALBUTEROL INH TO OPERATING Rm.

## 2021-12-21 ENCOUNTER — APPOINTMENT (OUTPATIENT)
Dept: GENERAL RADIOLOGY | Age: 51
DRG: 853 | End: 2021-12-21
Attending: PHYSICIAN ASSISTANT
Payer: COMMERCIAL

## 2021-12-21 ENCOUNTER — APPOINTMENT (OUTPATIENT)
Dept: CT IMAGING | Age: 51
DRG: 853 | End: 2021-12-21
Attending: INTERNAL MEDICINE
Payer: COMMERCIAL

## 2021-12-21 ENCOUNTER — APPOINTMENT (OUTPATIENT)
Dept: GENERAL RADIOLOGY | Age: 51
DRG: 853 | End: 2021-12-21
Attending: THORACIC SURGERY (CARDIOTHORACIC VASCULAR SURGERY)
Payer: COMMERCIAL

## 2021-12-21 ENCOUNTER — ANESTHESIA (OUTPATIENT)
Dept: SURGERY | Age: 51
DRG: 853 | End: 2021-12-21
Payer: COMMERCIAL

## 2021-12-21 LAB
ARTERIAL PATENCY WRIST A: ABNORMAL
BASE EXCESS BLDA CALC-SCNC: 0.6 MMOL/L (ref 0–2)
BASOPHILS # BLD: 0.1 K/UL (ref 0–0.1)
BASOPHILS NFR BLD: 0 % (ref 0–1)
BDY SITE: ABNORMAL
DIFFERENTIAL METHOD BLD: ABNORMAL
EOSINOPHIL # BLD: 0 K/UL (ref 0–0.4)
EOSINOPHIL NFR BLD: 0 % (ref 0–7)
ERYTHROCYTE [DISTWIDTH] IN BLOOD BY AUTOMATED COUNT: 13.2 % (ref 11.5–14.5)
FIO2 ON VENT: 21 %
HCO3 BLDA-SCNC: 25 MMOL/L (ref 22–26)
HCT VFR BLD AUTO: 35.6 % (ref 36.6–50.3)
HGB BLD-MCNC: 11.6 G/DL (ref 12.1–17)
IMM GRANULOCYTES # BLD AUTO: 0.2 K/UL (ref 0–0.04)
IMM GRANULOCYTES NFR BLD AUTO: 1 % (ref 0–0.5)
LYMPHOCYTES # BLD: 0.9 K/UL (ref 0.8–3.5)
LYMPHOCYTES NFR BLD: 3 % (ref 12–49)
MCH RBC QN AUTO: 28 PG (ref 26–34)
MCHC RBC AUTO-ENTMCNC: 32.6 G/DL (ref 30–36.5)
MCV RBC AUTO: 85.8 FL (ref 80–99)
MONOCYTES # BLD: 0.9 K/UL (ref 0–1)
MONOCYTES NFR BLD: 3 % (ref 5–13)
NEUTS SEG # BLD: 25.1 K/UL (ref 1.8–8)
NEUTS SEG NFR BLD: 93 % (ref 32–75)
NRBC # BLD: 0 K/UL (ref 0–0.01)
NRBC BLD-RTO: 0 PER 100 WBC
PCO2 BLDA: 35 MMHG (ref 35–45)
PH BLDA: 7.45 [PH] (ref 7.35–7.45)
PLATELET # BLD AUTO: 688 K/UL (ref 150–400)
PMV BLD AUTO: 9.5 FL (ref 8.9–12.9)
PO2 BLDA: 49 MMHG (ref 75–100)
RBC # BLD AUTO: 4.15 M/UL (ref 4.1–5.7)
SAO2 % BLD: 86 %
SAO2% DEVICE SAO2% SENSOR NAME: ABNORMAL
SERVICE CMNT-IMP: ABNORMAL
WBC # BLD AUTO: 27.1 K/UL (ref 4.1–11.1)

## 2021-12-21 PROCEDURE — 76010000135 HC OR TIME 4 TO 4.5 HR: Performed by: THORACIC SURGERY (CARDIOTHORACIC VASCULAR SURGERY)

## 2021-12-21 PROCEDURE — 74011250637 HC RX REV CODE- 250/637: Performed by: THORACIC SURGERY (CARDIOTHORACIC VASCULAR SURGERY)

## 2021-12-21 PROCEDURE — 74011250637 HC RX REV CODE- 250/637: Performed by: INTERNAL MEDICINE

## 2021-12-21 PROCEDURE — 77030018390 HC SPNG HEMSTAT2 J&J -B: Performed by: THORACIC SURGERY (CARDIOTHORACIC VASCULAR SURGERY)

## 2021-12-21 PROCEDURE — 88305 TISSUE EXAM BY PATHOLOGIST: CPT

## 2021-12-21 PROCEDURE — 77030010149 HC SLV TRCR ENDOSC J&J -B: Performed by: THORACIC SURGERY (CARDIOTHORACIC VASCULAR SURGERY)

## 2021-12-21 PROCEDURE — 77030011810 HC STPLR ENDOSC J&J -G: Performed by: THORACIC SURGERY (CARDIOTHORACIC VASCULAR SURGERY)

## 2021-12-21 PROCEDURE — 74011250636 HC RX REV CODE- 250/636: Performed by: PHYSICIAN ASSISTANT

## 2021-12-21 PROCEDURE — 74011250636 HC RX REV CODE- 250/636: Performed by: NURSE ANESTHETIST, CERTIFIED REGISTERED

## 2021-12-21 PROCEDURE — 87186 SC STD MICRODIL/AGAR DIL: CPT

## 2021-12-21 PROCEDURE — 76060000039 HC ANESTHESIA 4 TO 4.5 HR: Performed by: THORACIC SURGERY (CARDIOTHORACIC VASCULAR SURGERY)

## 2021-12-21 PROCEDURE — 77030031139 HC SUT VCRL2 J&J -A: Performed by: THORACIC SURGERY (CARDIOTHORACIC VASCULAR SURGERY)

## 2021-12-21 PROCEDURE — 65270000029 HC RM PRIVATE

## 2021-12-21 PROCEDURE — 77030018673: Performed by: THORACIC SURGERY (CARDIOTHORACIC VASCULAR SURGERY)

## 2021-12-21 PROCEDURE — 87086 URINE CULTURE/COLONY COUNT: CPT

## 2021-12-21 PROCEDURE — 82803 BLOOD GASES ANY COMBINATION: CPT

## 2021-12-21 PROCEDURE — 87205 SMEAR GRAM STAIN: CPT

## 2021-12-21 PROCEDURE — 77030009403 HC ELECTRD ENDO MEGA -B: Performed by: THORACIC SURGERY (CARDIOTHORACIC VASCULAR SURGERY)

## 2021-12-21 PROCEDURE — 32540 REMOVAL OF LUNG LESION: CPT | Performed by: THORACIC SURGERY (CARDIOTHORACIC VASCULAR SURGERY)

## 2021-12-21 PROCEDURE — 71045 X-RAY EXAM CHEST 1 VIEW: CPT

## 2021-12-21 PROCEDURE — 2709999900 HC NON-CHARGEABLE SUPPLY: Performed by: THORACIC SURGERY (CARDIOTHORACIC VASCULAR SURGERY)

## 2021-12-21 PROCEDURE — 74011250636 HC RX REV CODE- 250/636: Performed by: INTERNAL MEDICINE

## 2021-12-21 PROCEDURE — 76210000000 HC OR PH I REC 2 TO 2.5 HR: Performed by: THORACIC SURGERY (CARDIOTHORACIC VASCULAR SURGERY)

## 2021-12-21 PROCEDURE — 77030040361 HC SLV COMPR DVT MDII -B: Performed by: THORACIC SURGERY (CARDIOTHORACIC VASCULAR SURGERY)

## 2021-12-21 PROCEDURE — 74011000258 HC RX REV CODE- 258: Performed by: INTERNAL MEDICINE

## 2021-12-21 PROCEDURE — C2615 SEALANT, PULMONARY, LIQUID: HCPCS | Performed by: THORACIC SURGERY (CARDIOTHORACIC VASCULAR SURGERY)

## 2021-12-21 PROCEDURE — 85025 COMPLETE CBC W/AUTO DIFF WBC: CPT

## 2021-12-21 PROCEDURE — 77030005513 HC CATH URETH FOL11 MDII -B: Performed by: THORACIC SURGERY (CARDIOTHORACIC VASCULAR SURGERY)

## 2021-12-21 PROCEDURE — 74011000250 HC RX REV CODE- 250: Performed by: NURSE ANESTHETIST, CERTIFIED REGISTERED

## 2021-12-21 PROCEDURE — 77030010507 HC ADH SKN DERMBND J&J -B: Performed by: THORACIC SURGERY (CARDIOTHORACIC VASCULAR SURGERY)

## 2021-12-21 PROCEDURE — 87102 FUNGUS ISOLATION CULTURE: CPT

## 2021-12-21 PROCEDURE — 87077 CULTURE AEROBIC IDENTIFY: CPT

## 2021-12-21 PROCEDURE — 36600 WITHDRAWAL OF ARTERIAL BLOOD: CPT

## 2021-12-21 PROCEDURE — 77030002996 HC SUT SLK J&J -A: Performed by: THORACIC SURGERY (CARDIOTHORACIC VASCULAR SURGERY)

## 2021-12-21 PROCEDURE — 77030010512 HC APPL CLP LIG J&J -C: Performed by: THORACIC SURGERY (CARDIOTHORACIC VASCULAR SURGERY)

## 2021-12-21 PROCEDURE — 74011000250 HC RX REV CODE- 250: Performed by: ANESTHESIOLOGY

## 2021-12-21 PROCEDURE — C1729 CATH, DRAINAGE: HCPCS | Performed by: THORACIC SURGERY (CARDIOTHORACIC VASCULAR SURGERY)

## 2021-12-21 PROCEDURE — 77030009967 HC RELD STPLR ENDOSC J&J -C: Performed by: THORACIC SURGERY (CARDIOTHORACIC VASCULAR SURGERY)

## 2021-12-21 PROCEDURE — 94640 AIRWAY INHALATION TREATMENT: CPT

## 2021-12-21 PROCEDURE — 77030012390 HC DRN CHST BTL GTNG -B: Performed by: THORACIC SURGERY (CARDIOTHORACIC VASCULAR SURGERY)

## 2021-12-21 PROCEDURE — 74011250636 HC RX REV CODE- 250/636: Performed by: THORACIC SURGERY (CARDIOTHORACIC VASCULAR SURGERY)

## 2021-12-21 RX ORDER — LIDOCAINE HYDROCHLORIDE 20 MG/ML
INJECTION, SOLUTION EPIDURAL; INFILTRATION; INTRACAUDAL; PERINEURAL AS NEEDED
Status: DISCONTINUED | OUTPATIENT
Start: 2021-12-21 | End: 2021-12-21 | Stop reason: HOSPADM

## 2021-12-21 RX ORDER — LIDOCAINE HYDROCHLORIDE AND EPINEPHRINE 15; 5 MG/ML; UG/ML
INJECTION, SOLUTION EPIDURAL
Status: COMPLETED | OUTPATIENT
Start: 2021-12-21 | End: 2021-12-21

## 2021-12-21 RX ORDER — SUCCINYLCHOLINE CHLORIDE 20 MG/ML
INJECTION INTRAMUSCULAR; INTRAVENOUS AS NEEDED
Status: DISCONTINUED | OUTPATIENT
Start: 2021-12-21 | End: 2021-12-21 | Stop reason: HOSPADM

## 2021-12-21 RX ORDER — ROCURONIUM BROMIDE 10 MG/ML
INJECTION, SOLUTION INTRAVENOUS AS NEEDED
Status: DISCONTINUED | OUTPATIENT
Start: 2021-12-21 | End: 2021-12-21 | Stop reason: HOSPADM

## 2021-12-21 RX ORDER — ONDANSETRON 2 MG/ML
4 INJECTION INTRAMUSCULAR; INTRAVENOUS AS NEEDED
Status: DISCONTINUED | OUTPATIENT
Start: 2021-12-21 | End: 2021-12-21 | Stop reason: HOSPADM

## 2021-12-21 RX ORDER — LANOLIN ALCOHOL/MO/W.PET/CERES
1 CREAM (GRAM) TOPICAL
Status: DISCONTINUED | OUTPATIENT
Start: 2021-12-21 | End: 2021-12-27

## 2021-12-21 RX ORDER — NALOXONE HYDROCHLORIDE 0.4 MG/ML
0.4 INJECTION, SOLUTION INTRAMUSCULAR; INTRAVENOUS; SUBCUTANEOUS AS NEEDED
Status: DISCONTINUED | OUTPATIENT
Start: 2021-12-21 | End: 2021-12-25

## 2021-12-21 RX ORDER — FENTANYL/BUPIVACAINE/NS/PF 2-1250MCG
1-18 PREFILLED PUMP RESERVOIR EPIDURAL
Status: DISCONTINUED | OUTPATIENT
Start: 2021-12-21 | End: 2021-12-25

## 2021-12-21 RX ORDER — DEXTROSE, SODIUM CHLORIDE, AND POTASSIUM CHLORIDE 5; .45; .15 G/100ML; G/100ML; G/100ML
75 INJECTION INTRAVENOUS CONTINUOUS
Status: DISCONTINUED | OUTPATIENT
Start: 2021-12-21 | End: 2021-12-24

## 2021-12-21 RX ORDER — DIPHENHYDRAMINE HYDROCHLORIDE 50 MG/ML
12.5 INJECTION, SOLUTION INTRAMUSCULAR; INTRAVENOUS AS NEEDED
Status: DISCONTINUED | OUTPATIENT
Start: 2021-12-21 | End: 2021-12-21 | Stop reason: HOSPADM

## 2021-12-21 RX ORDER — DIPHENHYDRAMINE HYDROCHLORIDE 50 MG/ML
12.5 INJECTION, SOLUTION INTRAMUSCULAR; INTRAVENOUS
Status: DISCONTINUED | OUTPATIENT
Start: 2021-12-21 | End: 2021-12-27

## 2021-12-21 RX ORDER — BUPIVACAINE HYDROCHLORIDE 2.5 MG/ML
INJECTION, SOLUTION EPIDURAL; INFILTRATION; INTRACAUDAL AS NEEDED
Status: DISCONTINUED | OUTPATIENT
Start: 2021-12-21 | End: 2021-12-21 | Stop reason: HOSPADM

## 2021-12-21 RX ORDER — IPRATROPIUM BROMIDE AND ALBUTEROL SULFATE 2.5; .5 MG/3ML; MG/3ML
3 SOLUTION RESPIRATORY (INHALATION)
Status: COMPLETED | OUTPATIENT
Start: 2021-12-21 | End: 2021-12-21

## 2021-12-21 RX ORDER — KETOROLAC TROMETHAMINE 15 MG/ML
15 INJECTION, SOLUTION INTRAMUSCULAR; INTRAVENOUS EVERY 6 HOURS
Status: COMPLETED | OUTPATIENT
Start: 2021-12-21 | End: 2021-12-26

## 2021-12-21 RX ORDER — DEXAMETHASONE SODIUM PHOSPHATE 4 MG/ML
INJECTION, SOLUTION INTRA-ARTICULAR; INTRALESIONAL; INTRAMUSCULAR; INTRAVENOUS; SOFT TISSUE AS NEEDED
Status: DISCONTINUED | OUTPATIENT
Start: 2021-12-21 | End: 2021-12-21 | Stop reason: HOSPADM

## 2021-12-21 RX ORDER — FENTANYL CITRATE 50 UG/ML
50 INJECTION, SOLUTION INTRAMUSCULAR; INTRAVENOUS
Status: DISCONTINUED | OUTPATIENT
Start: 2021-12-21 | End: 2021-12-21 | Stop reason: HOSPADM

## 2021-12-21 RX ORDER — MULTIVITAMIN
1 TABLET ORAL DAILY
Status: DISCONTINUED | OUTPATIENT
Start: 2021-12-21 | End: 2021-12-27

## 2021-12-21 RX ORDER — SODIUM CHLORIDE 9 MG/ML
75 INJECTION, SOLUTION INTRAVENOUS CONTINUOUS
Status: DISCONTINUED | OUTPATIENT
Start: 2021-12-21 | End: 2021-12-24

## 2021-12-21 RX ORDER — PROPOFOL 10 MG/ML
INJECTION, EMULSION INTRAVENOUS AS NEEDED
Status: DISCONTINUED | OUTPATIENT
Start: 2021-12-21 | End: 2021-12-21 | Stop reason: HOSPADM

## 2021-12-21 RX ORDER — HYDROMORPHONE HYDROCHLORIDE 1 MG/ML
0.5 INJECTION, SOLUTION INTRAMUSCULAR; INTRAVENOUS; SUBCUTANEOUS
Status: DISCONTINUED | OUTPATIENT
Start: 2021-12-21 | End: 2021-12-21 | Stop reason: HOSPADM

## 2021-12-21 RX ORDER — SODIUM CHLORIDE 0.9 % (FLUSH) 0.9 %
5-40 SYRINGE (ML) INJECTION AS NEEDED
Status: DISCONTINUED | OUTPATIENT
Start: 2021-12-21 | End: 2021-12-21 | Stop reason: HOSPADM

## 2021-12-21 RX ORDER — MIDAZOLAM HYDROCHLORIDE 1 MG/ML
INJECTION, SOLUTION INTRAMUSCULAR; INTRAVENOUS AS NEEDED
Status: DISCONTINUED | OUTPATIENT
Start: 2021-12-21 | End: 2021-12-21 | Stop reason: HOSPADM

## 2021-12-21 RX ORDER — HYDROMORPHONE HYDROCHLORIDE 1 MG/ML
INJECTION, SOLUTION INTRAMUSCULAR; INTRAVENOUS; SUBCUTANEOUS AS NEEDED
Status: DISCONTINUED | OUTPATIENT
Start: 2021-12-21 | End: 2021-12-21 | Stop reason: HOSPADM

## 2021-12-21 RX ORDER — FAMOTIDINE 20 MG/1
20 TABLET, FILM COATED ORAL 2 TIMES DAILY
Status: DISCONTINUED | OUTPATIENT
Start: 2021-12-21 | End: 2021-12-27

## 2021-12-21 RX ORDER — ALBUTEROL SULFATE 0.83 MG/ML
2.5 SOLUTION RESPIRATORY (INHALATION) AS NEEDED
Status: DISCONTINUED | OUTPATIENT
Start: 2021-12-21 | End: 2021-12-21 | Stop reason: HOSPADM

## 2021-12-21 RX ORDER — ONDANSETRON 2 MG/ML
INJECTION INTRAMUSCULAR; INTRAVENOUS AS NEEDED
Status: DISCONTINUED | OUTPATIENT
Start: 2021-12-21 | End: 2021-12-21 | Stop reason: HOSPADM

## 2021-12-21 RX ORDER — SODIUM CHLORIDE 9 MG/ML
INJECTION, SOLUTION INTRAVENOUS
Status: DISCONTINUED | OUTPATIENT
Start: 2021-12-21 | End: 2021-12-21 | Stop reason: HOSPADM

## 2021-12-21 RX ORDER — OXYCODONE AND ACETAMINOPHEN 5; 325 MG/1; MG/1
2 TABLET ORAL AS NEEDED
Status: DISCONTINUED | OUTPATIENT
Start: 2021-12-21 | End: 2021-12-21 | Stop reason: HOSPADM

## 2021-12-21 RX ORDER — FENTANYL CITRATE 50 UG/ML
INJECTION, SOLUTION INTRAMUSCULAR; INTRAVENOUS AS NEEDED
Status: DISCONTINUED | OUTPATIENT
Start: 2021-12-21 | End: 2021-12-21 | Stop reason: HOSPADM

## 2021-12-21 RX ORDER — SODIUM CHLORIDE, SODIUM LACTATE, POTASSIUM CHLORIDE, CALCIUM CHLORIDE 600; 310; 30; 20 MG/100ML; MG/100ML; MG/100ML; MG/100ML
INJECTION, SOLUTION INTRAVENOUS
Status: DISCONTINUED | OUTPATIENT
Start: 2021-12-21 | End: 2021-12-21 | Stop reason: HOSPADM

## 2021-12-21 RX ADMIN — ROCURONIUM BROMIDE 45 MG: 50 INJECTION, SOLUTION INTRAVENOUS at 07:55

## 2021-12-21 RX ADMIN — FENTANYL CITRATE 25 MCG: 50 INJECTION, SOLUTION INTRAMUSCULAR; INTRAVENOUS at 11:20

## 2021-12-21 RX ADMIN — BUPIVACAINE HYDROCHLORIDE 3.12 MG: 2.5 INJECTION, SOLUTION EPIDURAL; INFILTRATION; INTRACAUDAL at 11:15

## 2021-12-21 RX ADMIN — Medication 5 ML/HR: at 12:58

## 2021-12-21 RX ADMIN — ONDANSETRON 4 MG: 2 INJECTION INTRAMUSCULAR; INTRAVENOUS at 09:15

## 2021-12-21 RX ADMIN — LIDOCAINE HYDROCHLORIDE 100 MG: 20 INJECTION, SOLUTION EPIDURAL; INFILTRATION; INTRACAUDAL; PERINEURAL at 07:48

## 2021-12-21 RX ADMIN — SODIUM CHLORIDE: 9 INJECTION, SOLUTION INTRAVENOUS at 09:15

## 2021-12-21 RX ADMIN — PIPERACILLIN SODIUM AND TAZOBACTAM SODIUM 100 ML: 3; .375 INJECTION, POWDER, LYOPHILIZED, FOR SOLUTION INTRAVENOUS at 09:26

## 2021-12-21 RX ADMIN — ROCURONIUM BROMIDE 5 MG: 50 INJECTION, SOLUTION INTRAVENOUS at 07:48

## 2021-12-21 RX ADMIN — SODIUM CHLORIDE 75 ML/HR: 9 INJECTION, SOLUTION INTRAVENOUS at 16:48

## 2021-12-21 RX ADMIN — SUCCINYLCHOLINE CHLORIDE 160 MG: 20 INJECTION, SOLUTION INTRAMUSCULAR; INTRAVENOUS at 07:48

## 2021-12-21 RX ADMIN — ALBUTEROL SULFATE 2 PUFF: 108 INHALANT RESPIRATORY (INHALATION) at 06:36

## 2021-12-21 RX ADMIN — FENTANYL CITRATE 25 MCG: 50 INJECTION, SOLUTION INTRAMUSCULAR; INTRAVENOUS at 11:11

## 2021-12-21 RX ADMIN — PIPERACILLIN SODIUM AND TAZOBACTAM SODIUM 3.38 G: 3; .375 INJECTION, POWDER, LYOPHILIZED, FOR SOLUTION INTRAVENOUS at 02:00

## 2021-12-21 RX ADMIN — ROCURONIUM BROMIDE 50 MG: 50 INJECTION, SOLUTION INTRAVENOUS at 10:45

## 2021-12-21 RX ADMIN — SUGAMMADEX 200 MG: 100 INJECTION, SOLUTION INTRAVENOUS at 11:34

## 2021-12-21 RX ADMIN — DEXAMETHASONE SODIUM PHOSPHATE 12 MG: 4 INJECTION, SOLUTION INTRA-ARTICULAR; INTRALESIONAL; INTRAMUSCULAR; INTRAVENOUS; SOFT TISSUE at 09:15

## 2021-12-21 RX ADMIN — LIDOCAINE HYDROCHLORIDE AND EPINEPHRINE 3 ML: 15; 5 INJECTION, SOLUTION EPIDURAL at 07:28

## 2021-12-21 RX ADMIN — HYDROMORPHONE HYDROCHLORIDE 1 MG: 1 INJECTION, SOLUTION INTRAMUSCULAR; INTRAVENOUS; SUBCUTANEOUS at 07:48

## 2021-12-21 RX ADMIN — SODIUM CHLORIDE: 9 INJECTION, SOLUTION INTRAVENOUS at 10:30

## 2021-12-21 RX ADMIN — Medication 10 ML: at 06:43

## 2021-12-21 RX ADMIN — PROPOFOL 50 MG: 10 INJECTION, EMULSION INTRAVENOUS at 07:52

## 2021-12-21 RX ADMIN — BUPIVACAINE HYDROCHLORIDE 3.12 MG: 2.5 INJECTION, SOLUTION EPIDURAL; INFILTRATION; INTRACAUDAL at 11:28

## 2021-12-21 RX ADMIN — PIPERACILLIN SODIUM AND TAZOBACTAM SODIUM 3.38 G: 3; .375 INJECTION, POWDER, LYOPHILIZED, FOR SOLUTION INTRAVENOUS at 18:13

## 2021-12-21 RX ADMIN — FAMOTIDINE 20 MG: 20 TABLET ORAL at 20:49

## 2021-12-21 RX ADMIN — IPRATROPIUM BROMIDE AND ALBUTEROL SULFATE 3 ML: .5; 3 SOLUTION RESPIRATORY (INHALATION) at 13:29

## 2021-12-21 RX ADMIN — BUPIVACAINE HYDROCHLORIDE 3.12 MG: 2.5 INJECTION, SOLUTION EPIDURAL; INFILTRATION; INTRACAUDAL at 11:20

## 2021-12-21 RX ADMIN — SODIUM CHLORIDE, POTASSIUM CHLORIDE, SODIUM LACTATE AND CALCIUM CHLORIDE: 600; 310; 30; 20 INJECTION, SOLUTION INTRAVENOUS at 09:02

## 2021-12-21 RX ADMIN — ROCURONIUM BROMIDE 50 MG: 50 INJECTION, SOLUTION INTRAVENOUS at 08:35

## 2021-12-21 RX ADMIN — SODIUM CHLORIDE, POTASSIUM CHLORIDE, SODIUM LACTATE AND CALCIUM CHLORIDE: 600; 310; 30; 20 INJECTION, SOLUTION INTRAVENOUS at 07:22

## 2021-12-21 RX ADMIN — FENTANYL CITRATE 25 MCG: 50 INJECTION, SOLUTION INTRAMUSCULAR; INTRAVENOUS at 11:28

## 2021-12-21 RX ADMIN — PROPOFOL 150 MG: 10 INJECTION, EMULSION INTRAVENOUS at 07:48

## 2021-12-21 RX ADMIN — KETOROLAC TROMETHAMINE 15 MG: 15 INJECTION, SOLUTION INTRAMUSCULAR; INTRAVENOUS at 18:14

## 2021-12-21 RX ADMIN — MIDAZOLAM HYDROCHLORIDE 2 MG: 2 INJECTION, SOLUTION INTRAMUSCULAR; INTRAVENOUS at 07:22

## 2021-12-21 RX ADMIN — BUPIVACAINE HYDROCHLORIDE 3.12 MG: 2.5 INJECTION, SOLUTION EPIDURAL; INFILTRATION; INTRACAUDAL at 11:11

## 2021-12-21 RX ADMIN — SODIUM CHLORIDE 100 MCG/MIN: 9 INJECTION, SOLUTION INTRAVENOUS at 08:30

## 2021-12-21 RX ADMIN — FENTANYL CITRATE 25 MCG: 50 INJECTION, SOLUTION INTRAMUSCULAR; INTRAVENOUS at 11:15

## 2021-12-21 NOTE — PROGRESS NOTES
Infectious Diseases Progress Note  Cory Smyth MD  [phone]  Date:2021       Room:PACU/PL  Patient Name:Jason Osman     YOB: 1970     Age:51 y.o. 51M with history of hypertension, bronchitis, asthma. Long history of smoking. He reports prior vaccinations against coronavirus.     21 admitted to [facility] from [facility] after he was found to have a pleural effusion. He presents with two weeks of productive cough, shortness of breath, fevers, chills, generalized weakness. During the ED course underwent CT imaging which revealed large multiloculated right pleural effusion.     21 CT Chest  IMPRESSION  Large multiloculated right pleural effusion with subsequent near complete  atelectasis of the right lung. Therapeutic and diagnostic thoracentesis  recommended. Tree-in-bud centrilobular nodules present throughout the left lower lobe with  larger nodular airspace opacity apical posterior segment left upper lobe. Findings consistent with bronchiolitis/bronchopneumonia. The larger airspace  nodule in the apical posterior segment left upper lobe require surveillance.     21 underwent IR thoracentesis  IMPRESSION  Ultrasound of the right upper back was performed with images stored in PACS,  demonstrating heterogeneous collection most consistent with emphysema. Successful US-guided 8.5 percutaneous drainage catheter placement into right  empyema.      Pleural cultures reveal Streptococcus anginosus and I have been asked to see him in consultation. 21 taken to OR by Dr Isabell Cardona for surgical management of Empyema  Procedure(s):  RIGHT VIDEO ASSISTED THORACIC SURGERY, Right THORACOTOMY, DECORTICATION     Subjective    Subjective:  Symptoms:  Stable. Review of Systems   All other systems reviewed and are negative.     Objective         Vitals Last 24 Hours:  TEMPERATURE:  Temp  Av.6 °F (37 °C)  Min: 97.6 °F (36.4 °C)  Max: 99.3 °F (37.4 °C)  RESPIRATIONS RANGE: Resp  Avg: 22.1  Min: 18  Max: 27  PULSE OXIMETRY RANGE: SpO2  Av.1 %  Min: 91 %  Max: 100 %  PULSE RANGE: Pulse  Av.7  Min: 82  Max: 100  BLOOD PRESSURE RANGE: Systolic (37TWX), NBL:074 , Min:79 , CIX:128   ; Diastolic (37UCX), MILES:90, Min:46, Max:81    I/O (24Hr): Intake/Output Summary (Last 24 hours) at 2021 1354  Last data filed at 2021 1141  Gross per 24 hour   Intake 3040 ml   Output 3400 ml   Net -360 ml     Objective:  General Appearance:  Comfortable. Vital signs: (most recent): Blood pressure 111/65, pulse 92, temperature 97.6 °F (36.4 °C), resp. rate 24, height 5' 11\" (1.803 m), weight 74.5 kg (164 lb 3.9 oz), SpO2 96 %. Vital signs are normal.    HEENT: Normal HEENT exam.    Lungs:  Normal effort and normal respiratory rate. Heart: Normal rate. Regular rhythm. Abdomen: Abdomen is soft. Bowel sounds are normal.     Extremities: Normal range of motion. Pulses: Distal pulses are intact. Neurological: Patient is alert. Pupils:  Pupils are equal, round, and reactive to light. Skin:  Warm and dry. Labs/Imaging/Diagnostics    Labs:  CBC:  Recent Labs     21  0732   WBC 14.5*   RBC 4.48   HGB 12.5   HCT 37.0   MCV 82.6   RDW 12.9   *     CHEMISTRIES:  No results for input(s): NA, K, CL, CO2, BUN, CA, PHOS, MG in the last 72 hours. No lab exists for component: CREATININE, GLUCOSEPT/INR:No results for input(s): INR, INREXT, INREXT in the last 72 hours. No lab exists for component: PROTIME  APTT:No results for input(s): APTT in the last 72 hours. LIVER PROFILE:No results for input(s): AST, ALT in the last 72 hours. No lab exists for component: Hinton Fusi, ALKPHOS  Lab Results   Component Value Date/Time    ALT (SGPT) 56 2021 05:34 PM    AST (SGOT) 51 (H) 2021 05:34 PM    Alk.  phosphatase 118 (H) 2021 05:34 PM    Bilirubin, total 1.0 2021 05:34 PM       Imaging Last 24 Hours:  XR CHEST PORT    Result Date: 12/21/2021  Chest single view. Comparison single view chest December 20, 2021. Post surgery. Newly placed right-sided chest tubes x2. Small volume right chest wall subcutaneous air. Right side pneumothorax, 4.2 cm apical pleural separation. Atelectatic right lung. Left lung aerated. Cardiac and mediastinal structures unchanged. Assessment//Plan   Active Problems:    Pleural effusion (12/8/2021)      Assessment & Plan   51M with history of hypertension, bronchitis, asthma. Long history of smoking. He reports prior vaccinations against coronavirus.     12/8/21 admitted to [facility] from [facility] after he was found to have a pleural effusion. He presents with two weeks of productive cough, shortness of breath, fevers, chills, generalized weakness. During the ED course underwent CT imaging which revealed large multiloculated right pleural effusion.     12/8/21 CT Chest  IMPRESSION  Large multiloculated right pleural effusion with subsequent near complete  atelectasis of the right lung. Therapeutic and diagnostic thoracentesis  recommended. Tree-in-bud centrilobular nodules present throughout the left lower lobe with  larger nodular airspace opacity apical posterior segment left upper lobe. Findings consistent with bronchiolitis/bronchopneumonia. The larger airspace  nodule in the apical posterior segment left upper lobe require surveillance.     12/9/21 underwent IR thoracentesis  IMPRESSION  Ultrasound of the right upper back was performed with images stored in PACS,  demonstrating heterogeneous collection most consistent with emphysema. Successful US-guided 8.5 percutaneous drainage catheter placement into right  empyema.      Pleural cultures reveal Streptococcus anginosus and I have been asked to see him in consultation.     12/21/21 taken to OR by Dr Anna Cr for surgical management of Empyema  Procedure(s):  RIGHT VIDEO ASSISTED THORACIC SURGERY, Right THORACOTOMY, DECORTICATION     MICROBIOLOGY     12/8/21 [id number]           Negative  12/8/21            Blood               Negative  12/9/21            Blood               Negative     12/9/21            IR pleural         Heavy Streptococcus anginosus     12/16/21          Quant TB         Negative    12/16/21 Pleural  AFB smear negative, AFB culture pending      Fungal pending    12/21/21 Pleural OR Pending  12/21/21 OR fungal Pending  12/21/21 Urine  Pending     ASSESSMENT AND RECOMMENDATIONS     1) Empyema presenting as complex loculated pleural effusion in the setting of advanced aspiration pneumonia.  Status post VATS decortication for empyema as above.                 Zosyn for now pending operative culture data       Electronically signed by Giuseppe Betancur MD on 12/21/2021 at 12:48 PM

## 2021-12-21 NOTE — ANESTHESIA PROCEDURE NOTES
Epidural Block    Patient location during procedure: holding area  Start time: 12/21/2021 7:28 AM  End time: 12/21/2021 7:33 AM  Reason for block: at surgeon's request and post-op pain management  Staffing  Performed: CRNA   Anesthesiologist: Meggan Sharpe MD  Resident/CRNA: Junior Alphonso CRNA  Preanesthetic Checklist  Completed: patient identified, IV checked, site marked, risks and benefits discussed, surgical consent, monitors and equipment checked, pre-op evaluation and timeout performed  Block Placement  Patient position: sitting  Prep: ChloraPrep  Sterility prep: cap, drape, gloves, hand and mask  Sedation level: light sedation  Patient monitoring: continuous pulse oximetry, ETCO2, frequent blood pressure checks and heart rate  Approach: midline  Location: thoracic  Thoracic location: T6-T7  Epidural  Loss of resistance technique: air  Guidance: landmark technique  Needle  Needle type: Tuohy   Needle gauge: 17 G  Needle length: 9 cm  Needle insertion depth: 5 cm  Catheter type: end hole  Catheter size: 19 G  Catheter at skin depth: 10 cm  Catheter securement method: clear occlusive dressing, stabilization device and surgical tape  Test dose: negative  Medications Administered  Lidocaine-EPINEPHrine (XYLOCAINE) 1.5 %-1:200,000 Epidural, 3 mL  Assessment  Block outcome: block to be assessed in the OR  Number of attempts: 1  Procedure assessment: patient tolerated procedure well with no immediate complications

## 2021-12-21 NOTE — ANESTHESIA POSTPROCEDURE EVALUATION
Procedure(s):  RIGHT VIDEO ASSISTED THORACIC SURGERY, POSSIBLE THORACOTOMY, DECORTICATION.     general, epidural    Anesthesia Post Evaluation      Multimodal analgesia: multimodal analgesia used between 6 hours prior to anesthesia start to PACU discharge  Patient location during evaluation: PACU  Patient participation: complete - patient participated  Level of consciousness: awake and alert  Pain score: 1  Pain management: adequate  Airway patency: patent  Anesthetic complications: no  Cardiovascular status: acceptable, blood pressure returned to baseline and hemodynamically stable  Respiratory status: acceptable, spontaneous ventilation, unassisted, face mask and airway suctioned  Hydration status: acceptable  Post anesthesia nausea and vomiting:  none  Final Post Anesthesia Temperature Assessment:  Normothermia (36.0-37.5 degrees C)      INITIAL Post-op Vital signs:   Vitals Value Taken Time   /71 12/21/21 1237   Temp 36.4 °C (97.6 °F) 12/21/21 1220   Pulse 90 12/21/21 1237   Resp 22 12/21/21 1237   SpO2 92 % 12/21/21 1237

## 2021-12-21 NOTE — BRIEF OP NOTE
Brief Postoperative Note    Patient: Loren Galdamez  YOB: 1970  MRN: [id number]    Date of Procedure: 12/21/2021     Pre-Op Diagnosis: EMPYEMA    Post-Op Diagnosis: Empyema    Procedure(s):  RIGHT VIDEO ASSISTED THORACIC SURGERY, Right THORACOTOMY, DECORTICATION    Surgeon(s):  Tena Castillo MD    Surgical Assistant: None    Anesthesia: General     Estimated Blood Loss (mL): 087    Complications: none    Specimens: Cultures    Implants None    Drains: 2 Chest tubes   Findings: Empyema    Electronically Signed by Ghassan Chandler MD on 12/21/2021 at 7:41 AM

## 2021-12-21 NOTE — PROGRESS NOTES
Problem: Falls - Risk of  Goal: *Absence of Falls  Description: Document Oren Tobias Fall Risk and appropriate interventions in the flowsheet. Outcome: Progressing Towards Goal  Note: Fall Risk Interventions:  Mobility Interventions: Patient to call before getting OOB         Medication Interventions: Bed/chair exit alarm    Elimination Interventions: Call light in reach              Problem: Patient Education: Go to Patient Education Activity  Goal: Patient/Family Education  Outcome: Progressing Towards Goal     Problem: Pain  Goal: *Control of Pain  Outcome: Progressing Towards Goal  Goal: *PALLIATIVE CARE:  Alleviation of Pain  Outcome: Progressing Towards Goal     Problem: Patient Education: Go to Patient Education Activity  Goal: Patient/Family Education  Outcome: Progressing Towards Goal  No change in pts  status.  Pt is scheduled to have chest tube removed on 12/21/21

## 2021-12-21 NOTE — PROGRESS NOTES
Problem: Falls - Risk of  Goal: *Absence of Falls  Description: Document [place] Fall Risk and appropriate interventions in the flowsheet.   Outcome: Progressing Towards Goal  Note: Fall Risk Interventions:  Mobility Interventions: Patient to call before getting OOB         Medication Interventions: Bed/chair exit alarm    Elimination Interventions: Call light in reach              Problem: Patient Education: Go to Patient Education Activity  Goal: Patient/Family Education  Outcome: Progressing Towards Goal     Problem: Pain  Goal: *Control of Pain  Outcome: Progressing Towards Goal  Goal: *PALLIATIVE CARE:  Alleviation of Pain  Outcome: Progressing Towards Goal     Problem: Patient Education: Go to Patient Education Activity  Goal: Patient/Family Education  Outcome: Progressing Towards Goal

## 2021-12-21 NOTE — PROGRESS NOTES
1002h: transfer report given to [address], Deleta Files. Patient arriving from surgery.  Belongings to be transported from [address] [place] [address],[address].

## 2021-12-21 NOTE — PROGRESS NOTES
Hospitalist Progress Note    Subjective:   Daily Progress Note: 12/21/2021 8:05 AM    Chest pain post decortication    Current Facility-Administered Medications   Medication Dose Route Frequency    multivitamin with folic acid (ONE DAILY WITH FOLIC ACID) tablet 1 Tablet  1 Tablet Oral DAILY    ferrous sulfate tablet 325 mg  1 Tablet Oral DAILY WITH BREAKFAST    dextrose 5% - 0.45% NaCl with KCl 20 mEq/L infusion  75 mL/hr IntraVENous CONTINUOUS    ketorolac (TORADOL) injection 15 mg  15 mg IntraVENous Q6H    famotidine (PEPCID) tablet 20 mg  20 mg Oral BID    naloxone (NARCAN) injection 0.4 mg  0.4 mg IntraVENous PRN    diphenhydrAMINE (BENADRYL) injection 12.5 mg  12.5 mg IntraVENous Q4H PRN    fentaNYL 2mcg/mL - bupivacaine 0.125% pf epidural  1-18 mL/hr Epidural TITRATE    nicotine (NICODERM CQ) 21 mg/24 hr patch 1 Patch  1 Patch TransDERmal DAILY    albuterol (PROVENTIL HFA, VENTOLIN HFA, PROAIR HFA) inhaler 2 Puff  2 Puff Inhalation Q6H PRN    budesonide-formoteroL (SYMBICORT) 160-4.5 mcg/actuation HFA inhaler 2 Puff  2 Puff Inhalation BID PRN    amLODIPine (NORVASC) tablet 5 mg  5 mg Oral DAILY    ondansetron (ZOFRAN ODT) tablet 4 mg  4 mg Oral Q8H PRN    piperacillin-tazobactam (ZOSYN) 3.375 g in 0.9% sodium chloride (MBP/ADV) 100 mL MBP  3.375 g IntraVENous Q8H    thiamine mononitrate (B-1) tablet 100 mg  100 mg Oral DAILY        Review of Systems  Review of Systems   Constitutional: Negative for chills, fever and malaise/fatigue. HENT: Negative. Respiratory: Negative for cough and shortness of breath. Cardiovascular:        Right pleuritic chest pain   Gastrointestinal: Negative for abdominal pain, nausea and vomiting. Genitourinary: Negative. Musculoskeletal: Negative. Neurological: Negative. Psychiatric/Behavioral: Negative.              Objective:     Visit Vitals  /78 (BP 1 Location: Left upper arm, BP Patient Position: At rest)   Pulse (!) 105   Temp 99.6 °F (37.6 °C)   Resp 18   Ht 5' 11\" (1.803 m)   Wt 74.5 kg (164 lb 3.9 oz)   SpO2 100%   BMI 22.91 kg/m²    O2 Flow Rate (L/min): 4 l/min O2 Device: Nasal cannula    Temp (24hrs), Av.6 °F (37 °C), Min:97.6 °F (36.4 °C), Max:100 °F (37.8 °C)      701 - 1900  In: 2800 [I.V.:2800]  Out: 2200 [Urine:600]  1901 -  0700  In: 720 [P.O.:720]  Out: 2500 [Urine:2400; Drains:100]    Recent Results (from the past 24 hour(s))   GLUCOSE, POC    Collection Time: 21  8:46 PM   Result Value Ref Range    Glucose (POC) 127 (H) 65 - 117 mg/dL    Performed by Diego Rosa    CBC WITH AUTOMATED DIFF    Collection Time: 21 12:50 PM   Result Value Ref Range    WBC 27.1 (H) 4.1 - 11.1 K/uL    RBC 4.15 4.10 - 5.70 M/uL    HGB 11.6 (L) 12.1 - 17.0 g/dL    HCT 35.6 (L) 36.6 - 50.3 %    MCV 85.8 80.0 - 99.0 FL    MCH 28.0 26.0 - 34.0 PG    MCHC 32.6 30.0 - 36.5 g/dL    RDW 13.2 11.5 - 14.5 %    PLATELET 381 (H) 911 - 400 K/uL    MPV 9.5 8.9 - 12.9 FL    NRBC 0.0 0.0  WBC    ABSOLUTE NRBC 0.00 0.00 - 0.01 K/uL    NEUTROPHILS 93 (H) 32 - 75 %    LYMPHOCYTES 3 (L) 12 - 49 %    MONOCYTES 3 (L) 5 - 13 %    EOSINOPHILS 0 0 - 7 %    BASOPHILS 0 0 - 1 %    IMMATURE GRANULOCYTES 1 (H) 0 - 0.5 %    ABS. NEUTROPHILS 25.1 (H) 1.8 - 8.0 K/UL    ABS. LYMPHOCYTES 0.9 0.8 - 3.5 K/UL    ABS. MONOCYTES 0.9 0.0 - 1.0 K/UL    ABS. EOSINOPHILS 0.0 0.0 - 0.4 K/UL    ABS. BASOPHILS 0.1 0.0 - 0.1 K/UL    ABS. IMM.  GRANS. 0.2 (H) 0.00 - 0.04 K/UL    DF AUTOMATED     BLOOD GAS, ARTERIAL    Collection Time: 21  1:45 PM   Result Value Ref Range    pH 7.45 7.35 - 7.45      PCO2 35 35 - 45 mmHg    PO2 49 (LL) 75 - 100 mmHg    O2 SAT 86 (L) >95 %    BICARBONATE 25 22 - 26 mmol/L    BASE EXCESS 0.6 0 - 2 mmol/L    O2 METHOD Room air      FIO2 21.0 %    SITE Right Radial      ROSANNE'S TEST PASS      Critical value read back CALLED TO PACU, MESERET MEIER         XR CHEST PORT   Final Result      XR CHEST PORT   Final Result   No significant interval change. CT CHEST WO CONT   Final Result   Complex findings in the right hemithorax as above, question related to   infection. Small left pleural effusion and mild left basilar atelectasis. Right hilar and mild mediastinal adenopathy is nonspecific, may be reactive. Inferior         XR CHEST PORT   Final Result   1. There is residual airspace disease versus atelectasis within the right mid   to lower lung zones without short-term interval changes. 2.  There has been partial evacuation of a  right-sided empyema with moderate   residual areas of loculated pleural fluid and gas without short-term interval   changes. 3.  On recent CT imaging from 12/8/2021, there was demonstrated a massive right   pleural effusion as well as tree-in-bud nodular densities within the left lung. These findings can be associated with tuberculosis and nontuberculous   mycobacterial infections. Please correlate clinically. XR CHEST PORT   Final Result      XR CHEST PORT   Final Result      XR CHEST PORT   Final Result      XR CHEST PORT   Final Result      IR THORACENTESIS/INSERT CHEST TUBE   Final Result   Ultrasound of the right upper back was performed with images stored in PACS,   demonstrating heterogeneous collection most consistent with emphysema. Successful US-guided 8.5 percutaneous drainage catheter placement into right   empyema. Plan:   Continue flushing the catheter using 10 ml sterile normal saline and record the   output at least once a day. Consider catheter removal if there is less than 20 mL output over 24 hours. XR CHEST PORT    (Results Pending)   XR CHEST PORT    (Results Pending)   XR CHEST PORT    (Results Pending)   XR CHEST PORT    (Results Pending)   XR CHEST PORT    (Results Pending)   XR CHEST PORT    (Results Pending)        PHYSICAL EXAM:    Physical Exam  Vitals reviewed. Constitutional:       General: He is not in acute distress. Appearance: He is not ill-appearing. HENT:      Head: Normocephalic and atraumatic. Mouth/Throat:      Mouth: Mucous membranes are moist.      Pharynx: Oropharynx is clear. Eyes:      Conjunctiva/sclera: Conjunctivae normal.   Cardiovascular:      Rate and Rhythm: Normal rate and regular rhythm. Heart sounds: Normal heart sounds. Pulmonary:      Effort: Pulmonary effort is normal.      Comments: Crackles and diminished breath sounds in the right lung. Abdominal:      General: Abdomen is flat. Bowel sounds are normal.      Palpations: Abdomen is soft. Musculoskeletal:         General: Normal range of motion. Cervical back: Normal range of motion and neck supple. Skin:     General: Skin is warm. Neurological:      General: No focal deficit present. Mental Status: He is alert and oriented to person, place, and time. Mental status is at baseline. Psychiatric:         Mood and Affect: Mood normal.          Data Review    Recent Results (from the past 24 hour(s))   GLUCOSE, POC    Collection Time: 12/20/21  8:46 PM   Result Value Ref Range    Glucose (POC) 127 (H) 65 - 117 mg/dL    Performed by Holley Gutierrez    CBC WITH AUTOMATED DIFF    Collection Time: 12/21/21 12:50 PM   Result Value Ref Range    WBC 27.1 (H) 4.1 - 11.1 K/uL    RBC 4.15 4.10 - 5.70 M/uL    HGB 11.6 (L) 12.1 - 17.0 g/dL    HCT 35.6 (L) 36.6 - 50.3 %    MCV 85.8 80.0 - 99.0 FL    MCH 28.0 26.0 - 34.0 PG    MCHC 32.6 30.0 - 36.5 g/dL    RDW 13.2 11.5 - 14.5 %    PLATELET 461 (H) 058 - 400 K/uL    MPV 9.5 8.9 - 12.9 FL    NRBC 0.0 0.0  WBC    ABSOLUTE NRBC 0.00 0.00 - 0.01 K/uL    NEUTROPHILS 93 (H) 32 - 75 %    LYMPHOCYTES 3 (L) 12 - 49 %    MONOCYTES 3 (L) 5 - 13 %    EOSINOPHILS 0 0 - 7 %    BASOPHILS 0 0 - 1 %    IMMATURE GRANULOCYTES 1 (H) 0 - 0.5 %    ABS. NEUTROPHILS 25.1 (H) 1.8 - 8.0 K/UL    ABS. LYMPHOCYTES 0.9 0.8 - 3.5 K/UL    ABS. MONOCYTES 0.9 0.0 - 1.0 K/UL    ABS.  EOSINOPHILS 0.0 0.0 - 0.4 K/UL ABS. BASOPHILS 0.1 0.0 - 0.1 K/UL    ABS. IMM. GRANS. 0.2 (H) 0.00 - 0.04 K/UL    DF AUTOMATED     BLOOD GAS, ARTERIAL    Collection Time: 12/21/21  1:45 PM   Result Value Ref Range    pH 7.45 7.35 - 7.45      PCO2 35 35 - 45 mmHg    PO2 49 (LL) 75 - 100 mmHg    O2 SAT 86 (L) >95 %    BICARBONATE 25 22 - 26 mmol/L    BASE EXCESS 0.6 0 - 2 mmol/L    O2 METHOD Room air      FIO2 21.0 %    SITE Right Radial      ROSANNE'S TEST PASS      Critical value read back CALLED TO PACU, MESERET MEIER         Assessment/Plan:     Active Problems:    Pleural effusion (12/8/2021)      This a 22-year-old male admitted on 12/8/2021 with a history of a hypertension and asthma who was transferred from [facility] due to worsening shortness of breath and productive cough. Patient with leukocytosis and a CT scan of the chest showing a large multiloculated right pleural effusion consistent with an empyema. There was also a left apical nodule and tree-in-bud appearance with multiple nodules in the left lower lobe. Patient with acute respiratory failure requiring supplemental oxygenation. Patient was found to be a thoracentesis to have Streptococcus angiosis. Infectious disease consult, Dr. Pari Marroquin currently treating with Zosyn. Decortication planned for 12/21/2021 by Dr. Ana Norris. Patient with an indwelling chest tube and purulent drainage noted from the right chest.  AFB smear negative, Quantiferon pending as well as acid-fast bacilli culture. Blood cultures have been negative. Pulmonary consultation, Dr. Mery Villanueva. Patient underwent VATS with right thoracotomy and decortication on 12/21/2021 by Dr. Marin Sparks:    1.   Right multiloculated pleural effusion consistent with empyema  Cardiothoracic surgery and decortication 12/21/2021  Continue Zosyn  ID consultation  Pulmonary consultation  Leukocytosis stable  Postprocedural pneumothorax and acute respiratory failure with hypoxia requiring 4 L.  2 chest tubes placed during the decortication  Pain management    2. Sepsis  Secondary #1    3. History of alcohol and tobacco abuse  Continue Ativan as needed  Nicotine patch    4.  Essential hypertension  Continue Norvasc    CODE STATUS: Full code    DVT prophylaxis: Lovenox on hold  Ulcer prophylaxis: Not indicated    Discharge barriers: Decortication 12/21/2021    Care Plan discussed with: Patient/Family    Total time spent with patient: >35 minutes.

## 2021-12-22 ENCOUNTER — APPOINTMENT (OUTPATIENT)
Dept: GENERAL RADIOLOGY | Age: 51
DRG: 853 | End: 2021-12-22
Attending: THORACIC SURGERY (CARDIOTHORACIC VASCULAR SURGERY)
Payer: COMMERCIAL

## 2021-12-22 LAB
ALBUMIN SERPL-MCNC: 1.3 G/DL (ref 3.5–5)
ALBUMIN/GLOB SERPL: 0.3 {RATIO} (ref 1.1–2.2)
ALP SERPL-CCNC: 207 U/L (ref 45–117)
ALT SERPL-CCNC: 39 U/L (ref 12–78)
ANION GAP SERPL CALC-SCNC: 6 MMOL/L (ref 5–15)
AST SERPL W P-5'-P-CCNC: 45 U/L (ref 15–37)
BACTERIA SPEC CULT: NORMAL
BASOPHILS # BLD: 0 K/UL (ref 0–0.1)
BASOPHILS NFR BLD: 0 % (ref 0–1)
BILIRUB SERPL-MCNC: 0.4 MG/DL (ref 0.2–1)
BUN SERPL-MCNC: 11 MG/DL (ref 6–20)
BUN/CREAT SERPL: 19 (ref 12–20)
CA-I BLD-MCNC: 8 MG/DL (ref 8.5–10.1)
CA-I BLD-MCNC: 8.1 MG/DL (ref 8.5–10.1)
CHLORIDE SERPL-SCNC: 106 MMOL/L (ref 97–108)
CO2 SERPL-SCNC: 26 MMOL/L (ref 21–32)
CREAT SERPL-MCNC: 0.57 MG/DL (ref 0.7–1.3)
DIFFERENTIAL METHOD BLD: ABNORMAL
EOSINOPHIL # BLD: 0.4 K/UL (ref 0–0.4)
EOSINOPHIL NFR BLD: 2 % (ref 0–7)
ERYTHROCYTE [DISTWIDTH] IN BLOOD BY AUTOMATED COUNT: 13.4 % (ref 11.5–14.5)
GLOBULIN SER CALC-MCNC: 4 G/DL (ref 2–4)
GLUCOSE SERPL-MCNC: 103 MG/DL (ref 65–100)
HCT VFR BLD AUTO: 31.1 % (ref 36.6–50.3)
HGB BLD-MCNC: 10.2 G/DL (ref 12.1–17)
IMM GRANULOCYTES # BLD AUTO: 0 K/UL
IMM GRANULOCYTES NFR BLD AUTO: 0 %
LYMPHOCYTES # BLD: 1.6 K/UL (ref 0.8–3.5)
LYMPHOCYTES NFR BLD: 9 % (ref 12–49)
MAGNESIUM SERPL-MCNC: 2 MG/DL (ref 1.6–2.4)
MCH RBC QN AUTO: 27.6 PG (ref 26–34)
MCHC RBC AUTO-ENTMCNC: 32.8 G/DL (ref 30–36.5)
MCV RBC AUTO: 84.3 FL (ref 80–99)
MONOCYTES # BLD: 1.1 K/UL (ref 0–1)
MONOCYTES NFR BLD: 6 % (ref 5–13)
NEUTS SEG # BLD: 15.2 K/UL (ref 1.8–8)
NEUTS SEG NFR BLD: 83 % (ref 32–75)
NRBC # BLD: 0 K/UL (ref 0–0.01)
NRBC BLD-RTO: 0 PER 100 WBC
PLATELET # BLD AUTO: 557 K/UL (ref 150–400)
PMV BLD AUTO: 9.4 FL (ref 8.9–12.9)
POTASSIUM SERPL-SCNC: 4.1 MMOL/L (ref 3.5–5.1)
PROT SERPL-MCNC: 5.3 G/DL (ref 6.4–8.2)
RBC # BLD AUTO: 3.69 M/UL (ref 4.1–5.7)
RBC MORPH BLD: ABNORMAL
SODIUM SERPL-SCNC: 138 MMOL/L (ref 136–145)
SPECIAL REQUESTS,SREQ: NORMAL
WBC # BLD AUTO: 18.3 K/UL (ref 4.1–11.1)

## 2021-12-22 PROCEDURE — 36415 COLL VENOUS BLD VENIPUNCTURE: CPT

## 2021-12-22 PROCEDURE — 74011250637 HC RX REV CODE- 250/637: Performed by: THORACIC SURGERY (CARDIOTHORACIC VASCULAR SURGERY)

## 2021-12-22 PROCEDURE — 80053 COMPREHEN METABOLIC PANEL: CPT

## 2021-12-22 PROCEDURE — 74011250637 HC RX REV CODE- 250/637: Performed by: INTERNAL MEDICINE

## 2021-12-22 PROCEDURE — 74011250636 HC RX REV CODE- 250/636: Performed by: PHYSICIAN ASSISTANT

## 2021-12-22 PROCEDURE — 74011000250 HC RX REV CODE- 250: Performed by: ANESTHESIOLOGY

## 2021-12-22 PROCEDURE — 74011250636 HC RX REV CODE- 250/636: Performed by: INTERNAL MEDICINE

## 2021-12-22 PROCEDURE — 85025 COMPLETE CBC W/AUTO DIFF WBC: CPT

## 2021-12-22 PROCEDURE — 83735 ASSAY OF MAGNESIUM: CPT

## 2021-12-22 PROCEDURE — 71045 X-RAY EXAM CHEST 1 VIEW: CPT

## 2021-12-22 PROCEDURE — 0BNK0ZZ RELEASE RIGHT LUNG, OPEN APPROACH: ICD-10-PCS | Performed by: THORACIC SURGERY (CARDIOTHORACIC VASCULAR SURGERY)

## 2021-12-22 PROCEDURE — 74011000258 HC RX REV CODE- 258: Performed by: INTERNAL MEDICINE

## 2021-12-22 PROCEDURE — 74011250636 HC RX REV CODE- 250/636: Performed by: THORACIC SURGERY (CARDIOTHORACIC VASCULAR SURGERY)

## 2021-12-22 PROCEDURE — 74011250637 HC RX REV CODE- 250/637: Performed by: NURSE PRACTITIONER

## 2021-12-22 PROCEDURE — 82310 ASSAY OF CALCIUM: CPT

## 2021-12-22 PROCEDURE — 0BBN0ZZ EXCISION OF RIGHT PLEURA, OPEN APPROACH: ICD-10-PCS | Performed by: THORACIC SURGERY (CARDIOTHORACIC VASCULAR SURGERY)

## 2021-12-22 PROCEDURE — 77010033678 HC OXYGEN DAILY

## 2021-12-22 PROCEDURE — 74011250636 HC RX REV CODE- 250/636: Performed by: HOSPITALIST

## 2021-12-22 PROCEDURE — 65270000029 HC RM PRIVATE

## 2021-12-22 RX ORDER — CALCIUM GLUCONATE 94 MG/ML
1 INJECTION, SOLUTION INTRAVENOUS ONCE
Status: DISCONTINUED | OUTPATIENT
Start: 2021-12-22 | End: 2021-12-22

## 2021-12-22 RX ORDER — KETOROLAC TROMETHAMINE 30 MG/ML
30 INJECTION, SOLUTION INTRAMUSCULAR; INTRAVENOUS EVERY 6 HOURS
Status: DISCONTINUED | OUTPATIENT
Start: 2021-12-22 | End: 2021-12-22

## 2021-12-22 RX ORDER — MAGNESIUM SULFATE 1 G/100ML
1 INJECTION INTRAVENOUS ONCE
Status: COMPLETED | OUTPATIENT
Start: 2021-12-22 | End: 2021-12-22

## 2021-12-22 RX ORDER — CALCIUM GLUCONATE 20 MG/ML
1 INJECTION, SOLUTION INTRAVENOUS ONCE
Status: COMPLETED | OUTPATIENT
Start: 2021-12-22 | End: 2021-12-22

## 2021-12-22 RX ADMIN — FAMOTIDINE 20 MG: 20 TABLET ORAL at 20:31

## 2021-12-22 RX ADMIN — PIPERACILLIN SODIUM AND TAZOBACTAM SODIUM 3.38 G: 3; .375 INJECTION, POWDER, LYOPHILIZED, FOR SOLUTION INTRAVENOUS at 17:39

## 2021-12-22 RX ADMIN — KETOROLAC TROMETHAMINE 15 MG: 15 INJECTION, SOLUTION INTRAMUSCULAR; INTRAVENOUS at 17:39

## 2021-12-22 RX ADMIN — PIPERACILLIN SODIUM AND TAZOBACTAM SODIUM 3.38 G: 3; .375 INJECTION, POWDER, LYOPHILIZED, FOR SOLUTION INTRAVENOUS at 09:04

## 2021-12-22 RX ADMIN — KETOROLAC TROMETHAMINE 15 MG: 15 INJECTION, SOLUTION INTRAMUSCULAR; INTRAVENOUS at 12:39

## 2021-12-22 RX ADMIN — SODIUM CHLORIDE 75 ML/HR: 9 INJECTION, SOLUTION INTRAVENOUS at 17:40

## 2021-12-22 RX ADMIN — THIAMINE HCL TAB 100 MG 100 MG: 100 TAB at 09:04

## 2021-12-22 RX ADMIN — Medication 5 ML/HR: at 00:05

## 2021-12-22 RX ADMIN — FERROUS SULFATE TAB 325 MG (65 MG ELEMENTAL FE) 325 MG: 325 (65 FE) TAB at 09:04

## 2021-12-22 RX ADMIN — AMLODIPINE BESYLATE 5 MG: 5 TABLET ORAL at 09:04

## 2021-12-22 RX ADMIN — KETOROLAC TROMETHAMINE 15 MG: 15 INJECTION, SOLUTION INTRAMUSCULAR; INTRAVENOUS at 05:37

## 2021-12-22 RX ADMIN — Medication 5 ML/HR: at 11:19

## 2021-12-22 RX ADMIN — Medication 5 ML/HR: at 19:08

## 2021-12-22 RX ADMIN — MAGNESIUM SULFATE HEPTAHYDRATE 1 G: 1 INJECTION, SOLUTION INTRAVENOUS at 11:19

## 2021-12-22 RX ADMIN — FAMOTIDINE 20 MG: 20 TABLET ORAL at 09:04

## 2021-12-22 RX ADMIN — PIPERACILLIN SODIUM AND TAZOBACTAM SODIUM 3.38 G: 3; .375 INJECTION, POWDER, LYOPHILIZED, FOR SOLUTION INTRAVENOUS at 01:03

## 2021-12-22 RX ADMIN — MULTIVITAMIN TABLET 1 TABLET: TABLET at 09:04

## 2021-12-22 RX ADMIN — KETOROLAC TROMETHAMINE 15 MG: 15 INJECTION, SOLUTION INTRAMUSCULAR; INTRAVENOUS at 00:42

## 2021-12-22 RX ADMIN — CALCIUM GLUCONATE 1000 MG: 20 INJECTION, SOLUTION INTRAVENOUS at 12:38

## 2021-12-22 NOTE — PROGRESS NOTES
Hospitalist Progress Note    Subjective:   Daily Progress Note: 12/22/2021 8:05 AM    Chest pain post decortication    Current Facility-Administered Medications   Medication Dose Route Frequency    multivitamin with folic acid (ONE DAILY WITH FOLIC ACID) tablet 1 Tablet  1 Tablet Oral DAILY    ferrous sulfate tablet 325 mg  1 Tablet Oral DAILY WITH BREAKFAST    dextrose 5% - 0.45% NaCl with KCl 20 mEq/L infusion  75 mL/hr IntraVENous CONTINUOUS    ketorolac (TORADOL) injection 15 mg  15 mg IntraVENous Q6H    famotidine (PEPCID) tablet 20 mg  20 mg Oral BID    naloxone (NARCAN) injection 0.4 mg  0.4 mg IntraVENous PRN    diphenhydrAMINE (BENADRYL) injection 12.5 mg  12.5 mg IntraVENous Q4H PRN    fentaNYL 2mcg/mL - bupivacaine 0.125% pf epidural  1-18 mL/hr Epidural TITRATE    0.9% sodium chloride infusion  75 mL/hr IntraVENous CONTINUOUS    nicotine (NICODERM CQ) 21 mg/24 hr patch 1 Patch  1 Patch TransDERmal DAILY    albuterol (PROVENTIL HFA, VENTOLIN HFA, PROAIR HFA) inhaler 2 Puff  2 Puff Inhalation Q6H PRN    budesonide-formoteroL (SYMBICORT) 160-4.5 mcg/actuation HFA inhaler 2 Puff  2 Puff Inhalation BID PRN    amLODIPine (NORVASC) tablet 5 mg  5 mg Oral DAILY    ondansetron (ZOFRAN ODT) tablet 4 mg  4 mg Oral Q8H PRN    piperacillin-tazobactam (ZOSYN) 3.375 g in 0.9% sodium chloride (MBP/ADV) 100 mL MBP  3.375 g IntraVENous Q8H    thiamine mononitrate (B-1) tablet 100 mg  100 mg Oral DAILY        Review of Systems  Review of Systems   Constitutional: Negative for chills, fever and malaise/fatigue. HENT: Negative. Respiratory: Negative for cough and shortness of breath. Cardiovascular:        Right pleuritic chest pain   Gastrointestinal: Negative for abdominal pain, nausea and vomiting. Genitourinary: Negative. Musculoskeletal: Negative. Neurological: Negative. Psychiatric/Behavioral: Negative.              Objective:     Visit Vitals  /69 (BP 1 Location: Right upper arm, BP Patient Position: At rest)   Pulse 96   Temp 100 °F (37.8 °C)   Resp 18   Ht 5' 11\" (1.803 m)   Wt 74.5 kg (164 lb 3.9 oz)   SpO2 100%   BMI 22.91 kg/m²    O2 Flow Rate (L/min): 4 l/min O2 Device: Nasal cannula    Temp (24hrs), Av.6 °F (37.6 °C), Min:97.6 °F (36.4 °C), Max:101 °F (38.3 °C)      701 - 1900  In: -   Out: 500 [Urine:400]  1901 - 700  In: 4027.5 [P.O.:500; I.V.:3527.5]  Out: 4400 [Urine:2800]    Recent Results (from the past 24 hour(s))   CALCIUM    Collection Time: 21  8:22 AM   Result Value Ref Range    Calcium 8.1 (L) 8.5 - 10.1 mg/dL   MAGNESIUM    Collection Time: 21  8:22 AM   Result Value Ref Range    Magnesium 2.0 1.6 - 2.4 mg/dL   CBC WITH AUTOMATED DIFF    Collection Time: 21  8:24 AM   Result Value Ref Range    WBC 18.3 (H) 4.1 - 11.1 K/uL    RBC 3.69 (L) 4.10 - 5.70 M/uL    HGB 10.2 (L) 12.1 - 17.0 g/dL    HCT 31.1 (L) 36.6 - 50.3 %    MCV 84.3 80.0 - 99.0 FL    MCH 27.6 26.0 - 34.0 PG    MCHC 32.8 30.0 - 36.5 g/dL    RDW 13.4 11.5 - 14.5 %    PLATELET 820 (H) 255 - 400 K/uL    MPV 9.4 8.9 - 12.9 FL    NRBC 0.0 0.0  WBC    ABSOLUTE NRBC 0.00 0.00 - 0.01 K/uL    NEUTROPHILS PENDING %    LYMPHOCYTES PENDING %    MONOCYTES PENDING %    EOSINOPHILS PENDING %    BASOPHILS PENDING %    IMMATURE GRANULOCYTES PENDING %    ABS. NEUTROPHILS PENDING K/UL    ABS. LYMPHOCYTES PENDING K/UL    ABS. MONOCYTES PENDING K/UL    ABS. EOSINOPHILS PENDING K/UL    ABS. BASOPHILS PENDING K/UL    ABS. IMM. GRANS.  PENDING K/UL    DF AUTOMATED     METABOLIC PANEL, COMPREHENSIVE    Collection Time: 21  8:24 AM   Result Value Ref Range    Sodium 138 136 - 145 mmol/L    Potassium 4.1 3.5 - 5.1 mmol/L    Chloride 106 97 - 108 mmol/L    CO2 26 21 - 32 mmol/L    Anion gap 6 5 - 15 mmol/L    Glucose 103 (H) 65 - 100 mg/dL    BUN 11 6 - 20 mg/dL    Creatinine 0.57 (L) 0.70 - 1.30 mg/dL    BUN/Creatinine ratio 19 12 - 20      GFR est AA >60 >60 ml/min/1.73m2 GFR est non-AA >60 >60 ml/min/1.73m2    Calcium 8.0 (L) 8.5 - 10.1 mg/dL    Bilirubin, total 0.4 0.2 - 1.0 mg/dL    AST (SGOT) 45 (H) 15 - 37 U/L    ALT (SGPT) 39 12 - 78 U/L    Alk. phosphatase 207 (H) 45 - 117 U/L    Protein, total 5.3 (L) 6.4 - 8.2 g/dL    Albumin 1.3 (L) 3.5 - 5.0 g/dL    Globulin 4.0 2.0 - 4.0 g/dL    A-G Ratio 0.3 (L) 1.1 - 2.2          XR CHEST PORT   Final Result      XR CHEST PORT   Final Result   1. Diminished right pneumothorax. 2. Probable free subdiaphragmatic gas in this patient with a right base chest   tube. 3. I reported the findings to the patient's nurse by telephone at 8:50 PM on   12/21/2021. The nurse indicated that she would report the findings to the   attending physician. XR CHEST PORT   Final Result      XR CHEST PORT   Final Result   No significant interval change. CT CHEST WO CONT   Final Result   Complex findings in the right hemithorax as above, question related to   infection. Small left pleural effusion and mild left basilar atelectasis. Right hilar and mild mediastinal adenopathy is nonspecific, may be reactive. Inferior         XR CHEST PORT   Final Result   1. There is residual airspace disease versus atelectasis within the right mid   to lower lung zones without short-term interval changes. 2.  There has been partial evacuation of a  right-sided empyema with moderate   residual areas of loculated pleural fluid and gas without short-term interval   changes. 3.  On recent CT imaging from 12/8/2021, there was demonstrated a massive right   pleural effusion as well as tree-in-bud nodular densities within the left lung. These findings can be associated with tuberculosis and nontuberculous   mycobacterial infections. Please correlate clinically.       XR CHEST PORT   Final Result      XR CHEST PORT   Final Result      XR CHEST PORT   Final Result      XR CHEST PORT   Final Result      IR THORACENTESIS/INSERT CHEST TUBE   Final Result Ultrasound of the right upper back was performed with images stored in PACS,   demonstrating heterogeneous collection most consistent with emphysema. Successful US-guided 8.5 percutaneous drainage catheter placement into right   empyema. Plan:   Continue flushing the catheter using 10 ml sterile normal saline and record the   output at least once a day. Consider catheter removal if there is less than 20 mL output over 24 hours. XR CHEST PORT    (Results Pending)   XR CHEST PORT    (Results Pending)   XR CHEST PORT    (Results Pending)   XR CHEST PORT    (Results Pending)   XR CHEST PORT    (Results Pending)        PHYSICAL EXAM:    Physical Exam  Vitals reviewed. Constitutional:       General: He is not in acute distress. Appearance: He is not ill-appearing. HENT:      Head: Normocephalic and atraumatic. Mouth/Throat:      Mouth: Mucous membranes are moist.      Pharynx: Oropharynx is clear. Eyes:      Conjunctiva/sclera: Conjunctivae normal.   Cardiovascular:      Rate and Rhythm: Normal rate and regular rhythm. Heart sounds: Normal heart sounds. Pulmonary:      Effort: Pulmonary effort is normal.      Comments:  diminished breath sounds in the right lung. Abdominal:      General: Abdomen is flat. Bowel sounds are normal.      Palpations: Abdomen is soft. Musculoskeletal:         General: Normal range of motion. Cervical back: Normal range of motion and neck supple. Skin:     General: Skin is warm. Neurological:      General: No focal deficit present. Mental Status: He is alert and oriented to person, place, and time. Mental status is at baseline.    Psychiatric:         Mood and Affect: Mood normal.          Data Review    Recent Results (from the past 24 hour(s))   CALCIUM    Collection Time: 12/22/21  8:22 AM   Result Value Ref Range    Calcium 8.1 (L) 8.5 - 10.1 mg/dL   MAGNESIUM    Collection Time: 12/22/21  8:22 AM   Result Value Ref Range Magnesium 2.0 1.6 - 2.4 mg/dL   CBC WITH AUTOMATED DIFF    Collection Time: 12/22/21  8:24 AM   Result Value Ref Range    WBC 18.3 (H) 4.1 - 11.1 K/uL    RBC 3.69 (L) 4.10 - 5.70 M/uL    HGB 10.2 (L) 12.1 - 17.0 g/dL    HCT 31.1 (L) 36.6 - 50.3 %    MCV 84.3 80.0 - 99.0 FL    MCH 27.6 26.0 - 34.0 PG    MCHC 32.8 30.0 - 36.5 g/dL    RDW 13.4 11.5 - 14.5 %    PLATELET 946 (H) 968 - 400 K/uL    MPV 9.4 8.9 - 12.9 FL    NRBC 0.0 0.0  WBC    ABSOLUTE NRBC 0.00 0.00 - 0.01 K/uL    NEUTROPHILS PENDING %    LYMPHOCYTES PENDING %    MONOCYTES PENDING %    EOSINOPHILS PENDING %    BASOPHILS PENDING %    IMMATURE GRANULOCYTES PENDING %    ABS. NEUTROPHILS PENDING K/UL    ABS. LYMPHOCYTES PENDING K/UL    ABS. MONOCYTES PENDING K/UL    ABS. EOSINOPHILS PENDING K/UL    ABS. BASOPHILS PENDING K/UL    ABS. IMM. GRANS. PENDING K/UL    DF AUTOMATED     METABOLIC PANEL, COMPREHENSIVE    Collection Time: 12/22/21  8:24 AM   Result Value Ref Range    Sodium 138 136 - 145 mmol/L    Potassium 4.1 3.5 - 5.1 mmol/L    Chloride 106 97 - 108 mmol/L    CO2 26 21 - 32 mmol/L    Anion gap 6 5 - 15 mmol/L    Glucose 103 (H) 65 - 100 mg/dL    BUN 11 6 - 20 mg/dL    Creatinine 0.57 (L) 0.70 - 1.30 mg/dL    BUN/Creatinine ratio 19 12 - 20      GFR est AA >60 >60 ml/min/1.73m2    GFR est non-AA >60 >60 ml/min/1.73m2    Calcium 8.0 (L) 8.5 - 10.1 mg/dL    Bilirubin, total 0.4 0.2 - 1.0 mg/dL    AST (SGOT) 45 (H) 15 - 37 U/L    ALT (SGPT) 39 12 - 78 U/L    Alk. phosphatase 207 (H) 45 - 117 U/L    Protein, total 5.3 (L) 6.4 - 8.2 g/dL    Albumin 1.3 (L) 3.5 - 5.0 g/dL    Globulin 4.0 2.0 - 4.0 g/dL    A-G Ratio 0.3 (L) 1.1 - 2.2          Assessment/Plan:     Active Problems:    Pleural effusion (12/8/2021)      This a 49-year-old male admitted on 12/8/2021 with a history of a hypertension and asthma who was transferred from [facility] due to worsening shortness of breath and productive cough.   Patient with leukocytosis and a CT scan of the chest showing a large multiloculated right pleural effusion consistent with an empyema. There was also a left apical nodule and tree-in-bud appearance with multiple nodules in the left lower lobe. Patient with acute respiratory failure requiring supplemental oxygenation. Patient was found to be a thoracentesis to have Streptococcus angiosis. Infectious disease consult, Dr. Vanessa Almaguer currently treating with Zosyn. Decortication planned for 2021 by Dr. Darron Tobin. Patient with an indwelling chest tube and purulent drainage noted from the right chest.  AFB smear negative, Quantiferon pending as well as acid-fast bacilli culture. Blood cultures have been negative. Pulmonary consultation, Dr. Faith Vu. Patient underwent VATS with right thoracotomy and decortication on 2021 by Dr. Dell Goodwin:    1. Right multiloculated pleural effusion consistent with empyema  Cardiothoracic surgery and decortication 2021  Continue Zosyn  ID consultation  Pulmonary consultation  Postprocedural pneumothorax and acute respiratory failure with hypoxia requiring 4 L.  2 chest tubes placed during the decortication  Pain management  Subdiaphragmatic air as surgery required intraperitoneal approach for decortication    2. Sepsis  Secondary #1    3. History of alcohol and tobacco abuse  Continue Ativan as needed  Nicotine patch    4.  Essential hypertension  Continue Norvasc    CODE STATUS: Full code    DVT prophylaxis: Lovenox on hold  Ulcer prophylaxis: Not indicated    Discharge barriers:Pain control    Care Plan discussed with: Patient/Family    Total time spent with patient: >35 minutes.

## 2021-12-22 NOTE — PROGRESS NOTES
Infectious Diseases Progress Note  Cory Dawn MD  [phone]  Date:2021       Room:[facility]  Patient Name:Jason Benton     YOB: 1970     Age:51 y.o. 51M with history of hypertension, bronchitis, asthma. Long history of smoking. He reports prior vaccinations against coronavirus.     21 admitted to [facility] from [facility] after he was found to have a pleural effusion. He presents with two weeks of productive cough, shortness of breath, fevers, chills, generalized weakness. During the ED course underwent CT imaging which revealed large multiloculated right pleural effusion.     21 CT Chest  IMPRESSION  Large multiloculated right pleural effusion with subsequent near complete  atelectasis of the right lung. Therapeutic and diagnostic thoracentesis  recommended. Tree-in-bud centrilobular nodules present throughout the left lower lobe with  larger nodular airspace opacity apical posterior segment left upper lobe. Findings consistent with bronchiolitis/bronchopneumonia. The larger airspace  nodule in the apical posterior segment left upper lobe require surveillance.     21 underwent IR thoracentesis  IMPRESSION  Ultrasound of the right upper back was performed with images stored in PACS,  demonstrating heterogeneous collection most consistent with emphysema. Successful US-guided 8.5 percutaneous drainage catheter placement into right  empyema.      Pleural cultures reveal Streptococcus anginosus and I have been asked to see him in consultation. 21 taken to OR by Dr Jeanmarie Benson for surgical management of Empyema  Procedure(s):  RIGHT VIDEO ASSISTED THORACIC SURGERY, Right THORACOTOMY, DECORTICATION     Subjective    Subjective:  Symptoms:  Stable. Review of Systems   All other systems reviewed and are negative.     Objective         Vitals Last 24 Hours:  TEMPERATURE:  Temp  Av.7 °F (37.6 °C)  Min: 99 °F (37.2 °C)  Max: 101 °F (38.3 °C)  RESPIRATIONS RANGE: Resp  Avg: 17.3  Min: 16  Max: 18  PULSE OXIMETRY RANGE: SpO2  Av.9 %  Min: 99 %  Max: 100 %  PULSE RANGE: Pulse  Av.1  Min: 91  Max: 104  BLOOD PRESSURE RANGE: Systolic (99ARN), FQV:056 , Min:111 , OVA:707   ; Diastolic (24UIB), QHU:29, Min:69, Max:88    I/O (24Hr): Intake/Output Summary (Last 24 hours) at 2021 1547  Last data filed at 2021 1343  Gross per 24 hour   Intake 1227.5 ml   Output 1800 ml   Net -572.5 ml     Objective:  General Appearance:  Comfortable. Vital signs: (most recent): Blood pressure 111/69, pulse 96, temperature 100 °F (37.8 °C), resp. rate 18, height 5' 11\" (1.803 m), weight 74.5 kg (164 lb 3.9 oz), SpO2 100 %. Vital signs are normal.    HEENT: Normal HEENT exam.    Lungs:  Normal effort and normal respiratory rate. Heart: Normal rate. Regular rhythm. Abdomen: Abdomen is soft. Bowel sounds are normal.     Extremities: Normal range of motion. Pulses: Distal pulses are intact. Neurological: Patient is alert. Pupils:  Pupils are equal, round, and reactive to light. Skin:  Warm and dry. Labs/Imaging/Diagnostics    Labs:  CBC:  Recent Labs     21  0824 21  1250 21  0732   WBC 18.3* 27.1* 14.5*   RBC 3.69* 4.15 4.48   HGB 10.2* 11.6* 12.5   HCT 31.1* 35.6* 37.0   MCV 84.3 85.8 82.6   RDW 13.4 13.2 12.9   * 688* 724*     CHEMISTRIES:  Recent Labs     21  0824 21  0822     --    K 4.1  --      --    CO2 26  --    BUN 11  --    CA 8.0* 8.1*   MG  --  2.0   PT/INR:No results for input(s): INR, INREXT, INREXT in the last 72 hours. No lab exists for component: PROTIME  APTT:No results for input(s): APTT in the last 72 hours. LIVER PROFILE:  Recent Labs     21   AST 45*   ALT 39     Lab Results   Component Value Date/Time    ALT (SGPT) 39 2021 08:24 AM    AST (SGOT) 45 (H) 2021 08:24 AM    Alk.  phosphatase 207 (H) 2021 08:24 AM    Bilirubin, total 0.4 2021 08:24 AM Imaging Last 24 Hours:  XR CHEST PORT    Result Date: 12/22/2021  Chest single view. Comparison single view chest December 21, 2021 at 19:21. Same position right sided chest tubes x2. 1.8 cm right apical pneumothorax. Right basilar atelectasis. Probable small volume dependent right pleural fluid. Unchanged appearance left lung. Cardiac and mediastinal structures unchanged. Stable subdiaphragmatic air. Similar small volume subcutaneous air right chest wall and neck. Study findings discussed with Dr. Phyllis Lynn by telephone. XR CHEST PORT    Result Date: 12/21/2021  Exam: Single view frontal chest Comparison: Prior exams dating to  12/17/2021, the most recent earlier today Findings: Right chest tubes appear stable. There is probable free subdiaphragmatic gas. A right base chest tube overlies the free air. Diminished right pneumothorax since earlier today. The apex of the lung is now 2.3 cm from the superior chest wall. Mediastinum remains midline. Persistent though diminished right lung atelectasis. No developing pulmonary parenchymal or pleural lesion. 1. Diminished right pneumothorax. 2. Probable free subdiaphragmatic gas in this patient with a right base chest tube. 3. I reported the findings to the patient's nurse by telephone at 8:50 PM on 12/21/2021. The nurse indicated that she would report the findings to the attending physician. Assessment//Plan   Active Problems:    Pleural effusion (12/8/2021)      Assessment & Plan   51M with history of hypertension, bronchitis, asthma. Long history of smoking. He reports prior vaccinations against coronavirus.     12/8/21 admitted to [facility] from [facility] after he was found to have a pleural effusion. He presents with two weeks of productive cough, shortness of breath, fevers, chills, generalized weakness.  During the ED course underwent CT imaging which revealed large multiloculated right pleural effusion.     12/8/21 CT Chest  IMPRESSION  Large multiloculated right pleural effusion with subsequent near complete  atelectasis of the right lung. Therapeutic and diagnostic thoracentesis  recommended. Tree-in-bud centrilobular nodules present throughout the left lower lobe with  larger nodular airspace opacity apical posterior segment left upper lobe. Findings consistent with bronchiolitis/bronchopneumonia. The larger airspace  nodule in the apical posterior segment left upper lobe require surveillance.     12/9/21 underwent IR thoracentesis  IMPRESSION  Ultrasound of the right upper back was performed with images stored in PACS,  demonstrating heterogeneous collection most consistent with emphysema. Successful US-guided 8.5 percutaneous drainage catheter placement into right  empyema.      Pleural cultures reveal Streptococcus anginosus and I have been asked to see him in consultation. 12/21/21 taken to OR by Dr Tal Villalobos for surgical management of Empyema  Procedure(s):  RIGHT VIDEO ASSISTED THORACIC SURGERY, Right THORACOTOMY, DECORTICATION     MICROBIOLOGY     12/8/21            Covid19           Negative  12/8/21            Blood               Negative  12/9/21            Blood               Negative     12/9/21            IR pleural         Heavy Streptococcus anginosus     12/16/21          Quant TB         Negative    12/16/21 Pleural  AFB smear negative, AFB culture pending      Fungal negative so far    12/21/21 Pleural OR Negative so far  12/21/21 OR fungal Pending  12/21/21 Urine  Negative     ASSESSMENT AND RECOMMENDATIONS     1) Empyema presenting as complex loculated pleural effusion in the setting of advanced aspiration pneumonia.  Status post VATS decortication for empyema as above.                 Zosyn iv while in hospital     Once stable for discharge, oral Augmentin to complete outpatient course                          Augmentin 875mg po bid through 1/7/22       Electronically signed by Bridger Fernandez MD on 12/22/2021 at 12:48 PM

## 2021-12-22 NOTE — PROGRESS NOTES
Comprehensive Nutrition Assessment    Type and Reason for Visit: FARNAZ nutrition re-screen/LOS    Nutrition Recommendations/Plan:   Continue current diet  RN to monitor PO intakes and need for ONS if pt changes his mind    Nutrition Assessment:  Dx pleural effusion. Pt reports good intakes, eating >75% of trays since admit most of the time. Last few meals PO decline but denies need for ONS or additional snacks at this time. No wt loss. No nutrition needs at this time. Labs and Meds reviewed.     Malnutrition Assessment:  Malnutrition Status:  No malnutrition      Current Nutrition Therapies:  ADULT DIET Regular    Electronically signed by Tanner Flower RD on 12/22/2021 at 10:23 AM    Contact: Ext [phone]

## 2021-12-22 NOTE — PROGRESS NOTES
Pulmonary, Critical Care    Name: Cecelia Chery MRN: [id number]   : 1970 Hospital: [address] [place] [address]   Date: 2021  Admission date: 2021 Hospital Day: 15       Subjective/Interval History:   Seen on the medical floor awake alert. History is 2 or so weeks of breathing difficulty. Started with severe chest pain pleuritic with chills and sweats he did not take his temperature but felt hot at times. He has had continued worsening presented to the emergency room has a large right pleural effusion. CT scan suggests multiloculated effusion. Leukocytosis up to 34,000 all consistent with empyema. He is a drinker of 1-2 sixpacks a day. Does drink to the point of falling asleep although he states he is never passed out. 12/10 percutaneous drainage catheter placed by IR yesterday with 1600 cc drainage overnight and so far today  chest catheter continues to drain he states he feels a little better. Chest x-ray today has small apical pneumothorax with significant amount of remaining pleural fluid   awake alert states he feels better   no specific complaints respirations   for decortication on Tuesday    Hospital Problems  Date Reviewed: 2021          Codes Class Noted POA    Pleural effusion ICD-10-CM: J90  ICD-9-CM: 511.9  2021 Unknown              IMPRESSION:   1. Right empyema culture  microaerophilic Streptococcus remains on Zosyn  2. Loculated pleural effusion   3. Status post right-sided VATS right thoracotomy with decortication  4. Aspiration pneumonia  5. Chest tube in place  6. Alcohol abuse  7. COPD exacerbation  Body mass index is 22.91 kg/m². RECOMMENDATIONS/PLAN:   1. Pleural fluid loculated positive for Streptococcus anguosus  2. Status post VATS right thoracotomy and decortication  3. Chest tube in place  4. No wheezing  bronchodilators now inhalers  5. Continue IV Zosyn WBC count continues to improve  6.  No evidence of developing DTs  7. Continue thiamine           [x] High complexity decision making was performed  [x] See my orders for details      Subjective/Initial History:     I was asked by Frederic Mas MD to see Marylu Listen  a 46 y.o.  male in consultation for a chief complaint of multiloculated right pleural effusion likely empyema      No Known Allergies     MAR reviewed and pertinent medications noted or modified as needed     Current Facility-Administered Medications   Medication    calcium gluconate injection 1 g    magnesium sulfate 1 g/100 ml IVPB (premix or compounded)    multivitamin with folic acid (ONE DAILY WITH FOLIC ACID) tablet 1 Tablet    ferrous sulfate tablet 325 mg    dextrose 5% - 0.45% NaCl with KCl 20 mEq/L infusion    ketorolac (TORADOL) injection 15 mg    famotidine (PEPCID) tablet 20 mg    naloxone (NARCAN) injection 0.4 mg    diphenhydrAMINE (BENADRYL) injection 12.5 mg    fentaNYL 2mcg/mL - bupivacaine 0.125% pf epidural    0.9% sodium chloride infusion    nicotine (NICODERM CQ) 21 mg/24 hr patch 1 Patch    albuterol (PROVENTIL HFA, VENTOLIN HFA, PROAIR HFA) inhaler 2 Puff    budesonide-formoteroL (SYMBICORT) 160-4.5 mcg/actuation HFA inhaler 2 Puff    amLODIPine (NORVASC) tablet 5 mg    ondansetron (ZOFRAN ODT) tablet 4 mg    piperacillin-tazobactam (ZOSYN) 3.375 g in 0.9% sodium chloride (MBP/ADV) 100 mL MBP    thiamine mononitrate (B-1) tablet 100 mg      Patient PCP: None  PMH:  has a past medical history of Asthma and Ill-defined condition. PSH:   has a past surgical history that includes ir thoracentesis/insert chest tube (12/9/2021). FHX: family history is not on file. SHX:  reports that he has quit smoking. He has never used smokeless tobacco. He reports current alcohol use. He reports previous drug use. To me he admits smoking 1 to 2 packs/day ever since he was 8 or 10.   He drinks 1-2 sixpacks a day beer     Systemic review:  General states his weight stable has been having chills and fever for the last 2 weeks or more  Eyes no double vision or momentary blindness  ENT no drainage or facial pain  Musculoskeletal no swollen tender joints  Endocrinologic no polyuria polydipsia  Neurologic no seizures or syncope  Gastrointestinal no nausea vomiting acid indigestion. Genitourinary no pain or discomfort on urination  Cardiovascular no history of heart disease or ankle edema he has had the pleuritic chest pain no diaphoresis  Respiratory as mentioned cough some yellow sputum fever chills shortness of breath and pleuritic pain on the right    Objective:     Vital Signs: Telemetry:    normal sinus rhythm Intake/Output:   Visit Vitals  /80 (BP 1 Location: Right upper arm, BP Patient Position: At rest)   Pulse 91   Temp 99.2 °F (37.3 °C)   Resp 18   Ht 5' 11\" (1.803 m)   Wt 74.5 kg (164 lb 3.9 oz)   SpO2 100%   BMI 22.91 kg/m²       Temp (24hrs), Av.1 °F (37.3 °C), Min:97.6 °F (36.4 °C), Max:101 °F (38.3 °C)        O2 Device: Nasal cannula O2 Flow Rate (L/min): 4 l/min       Wt Readings from Last 4 Encounters:   12/15/21 74.5 kg (164 lb 3.9 oz)   21 73.5 kg (162 lb)          Intake/Output Summary (Last 24 hours) at 2021 1035  Last data filed at 2021 0912  Gross per 24 hour   Intake 2027.5 ml   Output 3600 ml   Net -1572.5 ml       Last shift:      701 - 1900  In: -   Out: 100   Last 3 shifts: 1901 -  07  In: 4027.5 [P.O.:500; I.V.:3527.5]  Out: 4400 [Urine:2800]       Physical Exam:   General:  male; lying in bed no distress no accessory muscle usage  HEENT: NCAT, poor dentition,  Eyes: anicteric; conjunctiva clear extraocular movements intact  Neck: no nodes, no JVD, no accessory MM use.   Chest: no deformity,   Cardiac: Regular rate and rhythm  Lungs: Markedly improved breath sounds on the right present anteriorly and upper lung posteriorly and developing some in the right base now, left side fairly clear  Abd: Thin soft positive bowel sounds  Ext: no edema; no joint swelling; No clubbing  : clear urine  Neuro: Awake alert calm speech is clear moves all 4 extremities  Psych- no agitation, oriented to person;   Skin: warm, dry, no cyanosis;   Pulses: Brachial radial pulses intact  Capillary: Normal capillary refill    Labs:    Recent Labs     12/21/21  1250 12/20/21  0732   WBC 27.1* 14.5*   HGB 11.6* 12.5   * 724*     Recent Labs     12/22/21  0824 12/22/21  0822     --    K 4.1  --      --    CO2 26  --    *  --    BUN 11  --    CREA 0.57*  --    CA 8.0* 8.1*   MG  --  2.0   ALB 1.3*  --    ALT 39  --    . Pleural fluid WBC count 47,840 with 56% polys 32% lymphs pleural fluid protein 0.8 glucose 4  Pleural culture with  microaerophilic Streptococcus    Lab Results   Component Value Date/Time    Culture result: PENDING 12/21/2021 09:45 AM    Culture result: No fungus isolated 3 days 12/16/2021 05:45 PM    Culture result: Heavy Streptococcus anginosus 12/09/2021 11:30 AM     Imaging:    CXR Results  (Last 48 hours)               12/12/21 0854  XR CHEST PORT Final result    Narrative:  Chest single view. Comparison single view chest December 11, 2021. Similar appearance for the right hemithorax. Predominant right basilar airspace opacity, similar volume. Unchanged hydropneumothorax. Similar degree apical pleural separation. Left lung aerated. Cardiac and mediastinal structures unchanged. To me the x-ray is improved you can start to see the right hemidiaphragm now implying further decrease in effusion in the apical pneumothorax is minimally improved       12/11/21 0752  XR CHEST PORT Final result    Narrative:  Chest single view. Comparison single view chest December 8, 2021. Right-sided hydropneumothorax. Estimated moderate volume pleural fluid. 1.1 cm   apical pleural separation. Left lung aerated.  Cardiac and mediastinal structures unchanged. Results from [place] encounter on 12/08/21    XR CHEST PORT    Narrative  Exam: Single view frontal chest    Comparison: Prior exams dating to  12/17/2021, the most recent earlier today    Findings: Right chest tubes appear stable. There is probable free  subdiaphragmatic gas. A right base chest tube overlies the free air. Diminished  right pneumothorax since earlier today. The apex of the lung is now 2.3 cm from  the superior chest wall. Mediastinum remains midline. Persistent though  diminished right lung atelectasis. No developing pulmonary parenchymal or  pleural lesion. Impression  1. Diminished right pneumothorax. 2. Probable free subdiaphragmatic gas in this patient with a right base chest  tube. 3. I reported the findings to the patient's nurse by telephone at 8:50 PM on  12/21/2021. The nurse indicated that she would report the findings to the  attending physician. XR CHEST PORT    Narrative  Chest single view. Comparison single view chest December 20, 2021. Post surgery. Newly placed right-sided chest tubes x2. Small volume right chest wall subcutaneous air. Right side pneumothorax, 4.2 cm apical pleural separation. Atelectatic right  lung. Left lung aerated. Cardiac and mediastinal structures unchanged. XR CHEST PORT    Narrative  Chest single view. Comparison single view chest December 21, 2021 at 19:21. Same position right sided chest tubes x2. 1.8 cm right apical pneumothorax. Right basilar atelectasis. Probable small volume dependent right pleural fluid. Unchanged appearance left lung. Cardiac and mediastinal structures unchanged. Stable subdiaphragmatic air. Similar small volume subcutaneous air right chest wall and neck. Study findings discussed with Dr. Eura Hammans by telephone.     Results from [place] encounter on 12/08/21    CT CHEST WO CONT    Narrative  Noncontrast exam.  Comparison chest radiograph yesterday. Dose Reduction:  All CT scans at this facility are performed using dose reduction optimization  techniques as appropriate to a performed exam including the following: Automated  exposure control, adjustments of the mA and/or kV according to patient size, or  use of iterative reconstruction technique. 6 x 6 x 3.5 cm loculated collection with an air-fluid level in the right apex. Complex densities in the right mid and lower lower lung including thick-walled  lesion containing central well-defined gas lucencies, potentially loculated 6 cm  fluid collection in the lateral right midlung, and small posterior inferior  fluid containing a pigtail drain and multiple gas bubbles in a pattern  suggesting that the fluid is highly viscous. Multiple air bronchograms are noted  within these complex densities. Small left pleural effusion and left basilar atelectasis. No anterior pneumothorax. Right hilar and mediastinal adenopathy, likely reactive. Atherosclerosis, including coronary artery calcification. Impression  Complex findings in the right hemithorax as above, question related to  infection. Small left pleural effusion and mild left basilar atelectasis. Right hilar and mild mediastinal adenopathy is nonspecific, may be reactive. Inferior      · 12/9 discussion 2-week history of pleuritic pain cough chills sweats fever and a drinker who will drink to the point of falling asleep most likely has aspiration pneumonia with empyema. IR has been consulted for joellen peters.   He very likely will have multiple loculations preventing drainage and may need VATS  · 12/11 feels fine respirations nonlabored has just an occasional air bubble when he coughs chest x-ray with small apical pneumothorax significant fluid remains although it is certainly improved culture consistent with oral portillo to remain on Zosyn WBC count improved we will discontinue vancomycin  · 12/12 doing well at this point no air leak seen on chest tube and chest x-ray is mildly improved. Continue current treatment  · 12/13 drainage has decreased from the chest catheter. Will flush with saline and follow drainage.   Chest x-ray is improved diaphragm can be seen on the right with infiltrate in that area likely representing pneumonia cannot tell how much fluid remains  · 12/15 chest x-ray   · 12/17 on antibiotic no chest pain repeat chest x-ray shows infiltrate atelectasis right midlung and improvement in there right-sided pleural loculated effusion    Naz Jacob MD

## 2021-12-22 NOTE — PROGRESS NOTES
CM reviewed the clinical record. Patient is s/p VATS procedure and has a CT in place. CM will continue to following patient's progress for needs at [place].      Tanisha hPan

## 2021-12-23 ENCOUNTER — APPOINTMENT (OUTPATIENT)
Dept: GENERAL RADIOLOGY | Age: 51
DRG: 853 | End: 2021-12-23
Attending: THORACIC SURGERY (CARDIOTHORACIC VASCULAR SURGERY)
Payer: COMMERCIAL

## 2021-12-23 LAB
BASOPHILS # BLD: 0.1 K/UL (ref 0–0.1)
BASOPHILS NFR BLD: 1 % (ref 0–1)
DIFFERENTIAL METHOD BLD: ABNORMAL
EOSINOPHIL # BLD: 0.2 K/UL (ref 0–0.4)
EOSINOPHIL NFR BLD: 1 % (ref 0–7)
ERYTHROCYTE [DISTWIDTH] IN BLOOD BY AUTOMATED COUNT: 13.2 % (ref 11.5–14.5)
HCT VFR BLD AUTO: 29.8 % (ref 36.6–50.3)
HGB BLD-MCNC: 9.8 G/DL (ref 12.1–17)
IMM GRANULOCYTES # BLD AUTO: 0.1 K/UL (ref 0–0.04)
IMM GRANULOCYTES NFR BLD AUTO: 1 % (ref 0–0.5)
LYMPHOCYTES # BLD: 1.7 K/UL (ref 0.8–3.5)
LYMPHOCYTES NFR BLD: 11 % (ref 12–49)
MCH RBC QN AUTO: 27.8 PG (ref 26–34)
MCHC RBC AUTO-ENTMCNC: 32.9 G/DL (ref 30–36.5)
MCV RBC AUTO: 84.4 FL (ref 80–99)
MONOCYTES # BLD: 1.2 K/UL (ref 0–1)
MONOCYTES NFR BLD: 8 % (ref 5–13)
NEUTS SEG # BLD: 12.4 K/UL (ref 1.8–8)
NEUTS SEG NFR BLD: 78 % (ref 32–75)
NRBC # BLD: 0 K/UL (ref 0–0.01)
NRBC BLD-RTO: 0 PER 100 WBC
PLATELET # BLD AUTO: 514 K/UL (ref 150–400)
PMV BLD AUTO: 9.6 FL (ref 8.9–12.9)
RBC # BLD AUTO: 3.53 M/UL (ref 4.1–5.7)
WBC # BLD AUTO: 15.7 K/UL (ref 4.1–11.1)

## 2021-12-23 PROCEDURE — 74011000250 HC RX REV CODE- 250: Performed by: ANESTHESIOLOGY

## 2021-12-23 PROCEDURE — 36415 COLL VENOUS BLD VENIPUNCTURE: CPT

## 2021-12-23 PROCEDURE — 74011250637 HC RX REV CODE- 250/637: Performed by: INTERNAL MEDICINE

## 2021-12-23 PROCEDURE — 74011250636 HC RX REV CODE- 250/636: Performed by: INTERNAL MEDICINE

## 2021-12-23 PROCEDURE — 74011250637 HC RX REV CODE- 250/637: Performed by: THORACIC SURGERY (CARDIOTHORACIC VASCULAR SURGERY)

## 2021-12-23 PROCEDURE — 74011000258 HC RX REV CODE- 258: Performed by: INTERNAL MEDICINE

## 2021-12-23 PROCEDURE — 77010033678 HC OXYGEN DAILY

## 2021-12-23 PROCEDURE — 74011250636 HC RX REV CODE- 250/636: Performed by: THORACIC SURGERY (CARDIOTHORACIC VASCULAR SURGERY)

## 2021-12-23 PROCEDURE — 74011250637 HC RX REV CODE- 250/637: Performed by: NURSE PRACTITIONER

## 2021-12-23 PROCEDURE — 94640 AIRWAY INHALATION TREATMENT: CPT

## 2021-12-23 PROCEDURE — 74011250636 HC RX REV CODE- 250/636: Performed by: PHYSICIAN ASSISTANT

## 2021-12-23 PROCEDURE — 65270000029 HC RM PRIVATE

## 2021-12-23 PROCEDURE — 85025 COMPLETE CBC W/AUTO DIFF WBC: CPT

## 2021-12-23 PROCEDURE — 71045 X-RAY EXAM CHEST 1 VIEW: CPT

## 2021-12-23 RX ADMIN — Medication 5 ML/HR: at 06:19

## 2021-12-23 RX ADMIN — KETOROLAC TROMETHAMINE 15 MG: 15 INJECTION, SOLUTION INTRAMUSCULAR; INTRAVENOUS at 17:02

## 2021-12-23 RX ADMIN — FAMOTIDINE 20 MG: 20 TABLET ORAL at 08:37

## 2021-12-23 RX ADMIN — FAMOTIDINE 20 MG: 20 TABLET ORAL at 20:39

## 2021-12-23 RX ADMIN — THIAMINE HCL TAB 100 MG 100 MG: 100 TAB at 08:37

## 2021-12-23 RX ADMIN — KETOROLAC TROMETHAMINE 15 MG: 15 INJECTION, SOLUTION INTRAMUSCULAR; INTRAVENOUS at 11:37

## 2021-12-23 RX ADMIN — FERROUS SULFATE TAB 325 MG (65 MG ELEMENTAL FE) 325 MG: 325 (65 FE) TAB at 08:37

## 2021-12-23 RX ADMIN — AMLODIPINE BESYLATE 5 MG: 5 TABLET ORAL at 08:37

## 2021-12-23 RX ADMIN — KETOROLAC TROMETHAMINE 15 MG: 15 INJECTION, SOLUTION INTRAMUSCULAR; INTRAVENOUS at 00:17

## 2021-12-23 RX ADMIN — KETOROLAC TROMETHAMINE 15 MG: 15 INJECTION, SOLUTION INTRAMUSCULAR; INTRAVENOUS at 06:30

## 2021-12-23 RX ADMIN — PIPERACILLIN SODIUM AND TAZOBACTAM SODIUM 3.38 G: 3; .375 INJECTION, POWDER, LYOPHILIZED, FOR SOLUTION INTRAVENOUS at 02:40

## 2021-12-23 RX ADMIN — SODIUM CHLORIDE 75 ML/HR: 9 INJECTION, SOLUTION INTRAVENOUS at 22:18

## 2021-12-23 RX ADMIN — PIPERACILLIN SODIUM AND TAZOBACTAM SODIUM 3.38 G: 3; .375 INJECTION, POWDER, LYOPHILIZED, FOR SOLUTION INTRAVENOUS at 08:40

## 2021-12-23 RX ADMIN — ALBUTEROL SULFATE 2 PUFF: 108 INHALANT RESPIRATORY (INHALATION) at 22:24

## 2021-12-23 RX ADMIN — Medication 5 ML/HR: at 13:26

## 2021-12-23 RX ADMIN — PIPERACILLIN SODIUM AND TAZOBACTAM SODIUM 3.38 G: 3; .375 INJECTION, POWDER, LYOPHILIZED, FOR SOLUTION INTRAVENOUS at 17:02

## 2021-12-23 RX ADMIN — MULTIVITAMIN TABLET 1 TABLET: TABLET at 08:37

## 2021-12-23 NOTE — PROGRESS NOTES
Problem: Falls - Risk of  Goal: *Absence of Falls  Description: Document Bard Fink Fall Risk and appropriate interventions in the flowsheet.   Outcome: Progressing Towards Goal  Note: Fall Risk Interventions:  Mobility Interventions: Bed/chair exit alarm,Patient to call before getting OOB         Medication Interventions: Bed/chair exit alarm,Patient to call before getting OOB,Teach patient to arise slowly    Elimination Interventions: Bed/chair exit alarm,Call light in reach,Patient to call for help with toileting needs

## 2021-12-23 NOTE — OP NOTES
[address] [facility]  OPERATIVE REPORT    Name:  Kathrine Baker  MR#:  [id number]  :  1970  ACCOUNT #:  [de-identified]  DATE OF SERVICE:  2021    PREOPERATIVE DIAGNOSIS:  Empyema. POSTOPERATIVE DIAGNOSIS:  Empyema. PROCEDURE PERFORMED:  Video-assisted thoracoscopic chest evaluation, right thoracotomy with empyemectomy. SURGEON:  Tano Bergeron. Piedad Najera MD    ASSISTANT:  none    ANESTHESIA:  none    COMPLICATIONS:  None. SPECIMENS REMOVED:  Right pleural fluid culture sent for microbiology and then a large portion of the peel was sent for pathology. IMPLANTS: none    ESTIMATED BLOOD LOSS:  500 mL. COUNTS:  Sponge count and needle count were correct at the end of the case. INDICATIONS FOR PROCEDURE:  The patient was admitted to the hospital for last two weeks with fever, chills, elevated white count. He had a right chest completely full of pus and was treated with an interventionally placed radiology catheter with subsequent conversion of the appearance of the fluid to serosanguineous fluid over a week and decrease of his white count down almost to normal and good resolution of pain and discomfort and shortness of breath. He grew up Strep and has been on antibiotic for that. He was taken to the operating room on the date of surgery for enucleation of the multiloculated effusion. OPERATIVE TECHNIQUE:  The patient was taken to the operating room and placed on the table in supine position. After induction of anesthesia, he was prepped and draped with his right side up, and the thoracoscopic port was placed into the chest.  It was clear this could not be handled thoracoscopically and therefore the patient was converted to a posterolateral thoracotomy. He had a completely frozen chest wall which necessitated a full posterolateral thoracotomy. Upon entering the chest cavity, we performed the usual removal of serosanguineous and pus-type fluid.   It was sent for aerobic, anaerobic, fungal, and TB cultures and stains. A full parietal decortication was performed and then the visceral  Pleura that was around the lung was removed. At the completion of the procedure, the findings were a right lower lobe abscess that had blown out into the pleural space, a right upper lobe abscess that had blown out into the pleural space, and there was a continued abscess in the superior segment of the right lower lobe that had not burst through yet, but I opened that up with a knife and got out the pus-type material.    There was another smaller area like this in the middle lobe, and a bridging hole in the abscess of the right lower lobe was stapled with two firings of an Endo-PRIYA stapler. The abscess cavity on the diaphragm was quite thick and eventual removal of all that via a small hole in the diaphragm that was closed with two layers of 2-0 Vicryl suture. Straight right angle 32-Bermudian chest tube was placed. Progel was placed overall the raw surface of the lung and about 75% of the visceral pleura had to be removed, this was chemically fused to the abscess peel. We appeared to have good expansion of the lung and despite removal of most of visceral pleura, I did not really have much in the way of air leaks, which was good. Chest cavity was closed with interrupted #2 Tevdek. Subcutaneous tissue was closed with 2-0, 3-0, and 4-0 Vicryl subcuticular. ADDITIONAL FINDINGS:  When the Anesthesiology was intubating him, it was very difficult intubation, I felt he had somewhat stricture right below his vocal cord, but eventually we were able to get a large size double lumen tube through there, maybe it had dilated it some. 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 I will have ENT see him either prior to discharge or right after discharge for elective evaluation of those issues down the road.   Also of note, obviously as mentioned in the report above, we did enter the abdomen. The patient will have free air on all of his abdominal films for the next week or so, this is of no concern. He does not have perforated viscus. He just has an entry into his abdomen from the surgery. However, if he does have persistent air leak, some of that may find its way down there again. I dictated on the indication for his first postoperative x-ray in the ICU that he has entry into the abdomen, he will have free air on his x-ray and it should be of no concern.       Brennon Beltrán MD      [id number]  D:  12/22/2021 10:56  T:  12/22/2021 19:09  JOB #:  [id number]

## 2021-12-23 NOTE — PROGRESS NOTES
Hospitalist Progress Note    Subjective:   Daily Progress Note: 12/23/2021 8:05 AM    Chest pain post decortication    Current Facility-Administered Medications   Medication Dose Route Frequency    multivitamin with folic acid (ONE DAILY WITH FOLIC ACID) tablet 1 Tablet  1 Tablet Oral DAILY    ferrous sulfate tablet 325 mg  1 Tablet Oral DAILY WITH BREAKFAST    dextrose 5% - 0.45% NaCl with KCl 20 mEq/L infusion  75 mL/hr IntraVENous CONTINUOUS    ketorolac (TORADOL) injection 15 mg  15 mg IntraVENous Q6H    famotidine (PEPCID) tablet 20 mg  20 mg Oral BID    naloxone (NARCAN) injection 0.4 mg  0.4 mg IntraVENous PRN    diphenhydrAMINE (BENADRYL) injection 12.5 mg  12.5 mg IntraVENous Q4H PRN    fentaNYL 2mcg/mL - bupivacaine 0.125% pf epidural  1-18 mL/hr Epidural TITRATE    0.9% sodium chloride infusion  75 mL/hr IntraVENous CONTINUOUS    nicotine (NICODERM CQ) 21 mg/24 hr patch 1 Patch  1 Patch TransDERmal DAILY    albuterol (PROVENTIL HFA, VENTOLIN HFA, PROAIR HFA) inhaler 2 Puff  2 Puff Inhalation Q6H PRN    budesonide-formoteroL (SYMBICORT) 160-4.5 mcg/actuation HFA inhaler 2 Puff  2 Puff Inhalation BID PRN    amLODIPine (NORVASC) tablet 5 mg  5 mg Oral DAILY    ondansetron (ZOFRAN ODT) tablet 4 mg  4 mg Oral Q8H PRN    piperacillin-tazobactam (ZOSYN) 3.375 g in 0.9% sodium chloride (MBP/ADV) 100 mL MBP  3.375 g IntraVENous Q8H    thiamine mononitrate (B-1) tablet 100 mg  100 mg Oral DAILY        Review of Systems  Review of Systems   Constitutional: Negative for chills, fever and malaise/fatigue. HENT: Negative. Respiratory: Negative for cough and shortness of breath. Cardiovascular:        Right pleuritic chest pain   Gastrointestinal: Negative for abdominal pain, nausea and vomiting. Genitourinary: Negative. Musculoskeletal: Negative. Neurological: Negative. Psychiatric/Behavioral: Negative.              Objective:     Visit Vitals  /69 (BP 1 Location: Left upper arm, BP Patient Position: At rest)   Pulse 87   Temp 99.8 °F (37.7 °C)   Resp 20   Ht 5' 11\" (1.803 m)   Wt 74.5 kg (164 lb 3.9 oz)   SpO2 100%   BMI 22.91 kg/m²    O2 Flow Rate (L/min): 4 l/min O2 Device: Nasal cannula    Temp (24hrs), Av.3 °F (37.4 °C), Min:98.4 °F (36.9 °C), Max:100.4 °F (38 °C)      701 - 1900  In: -   Out: 813 [ACQJC:724]  1901 - 700  In: 907.3 [P.O.:250; I.V.:657.3]  Out: 3600 [Urine:3300]    Recent Results (from the past 24 hour(s))   CBC WITH AUTOMATED DIFF    Collection Time: 21  8:47 AM   Result Value Ref Range    WBC 15.7 (H) 4.1 - 11.1 K/uL    RBC 3.53 (L) 4.10 - 5.70 M/uL    HGB 9.8 (L) 12.1 - 17.0 g/dL    HCT 29.8 (L) 36.6 - 50.3 %    MCV 84.4 80.0 - 99.0 FL    MCH 27.8 26.0 - 34.0 PG    MCHC 32.9 30.0 - 36.5 g/dL    RDW 13.2 11.5 - 14.5 %    PLATELET 687 (H) 616 - 400 K/uL    MPV 9.6 8.9 - 12.9 FL    NRBC 0.0 0.0  WBC    ABSOLUTE NRBC 0.00 0.00 - 0.01 K/uL    NEUTROPHILS 78 (H) 32 - 75 %    LYMPHOCYTES 11 (L) 12 - 49 %    MONOCYTES 8 5 - 13 %    EOSINOPHILS 1 0 - 7 %    BASOPHILS 1 0 - 1 %    IMMATURE GRANULOCYTES 1 (H) 0 - 0.5 %    ABS. NEUTROPHILS 12.4 (H) 1.8 - 8.0 K/UL    ABS. LYMPHOCYTES 1.7 0.8 - 3.5 K/UL    ABS. MONOCYTES 1.2 (H) 0.0 - 1.0 K/UL    ABS. EOSINOPHILS 0.2 0.0 - 0.4 K/UL    ABS. BASOPHILS 0.1 0.0 - 0.1 K/UL    ABS. IMM. GRANS. 0.1 (H) 0.00 - 0.04 K/UL    DF AUTOMATED          XR CHEST PORT   Final Result      XR CHEST PORT   Final Result      XR CHEST PORT   Final Result   1. Diminished right pneumothorax. 2. Probable free subdiaphragmatic gas in this patient with a right base chest   tube. 3. I reported the findings to the patient's nurse by telephone at 8:50 PM on   2021. The nurse indicated that she would report the findings to the   attending physician. XR CHEST PORT   Final Result      XR CHEST PORT   Final Result   No significant interval change.       CT CHEST WO CONT   Final Result   Complex findings in the right hemithorax as above, question related to   infection. Small left pleural effusion and mild left basilar atelectasis. Right hilar and mild mediastinal adenopathy is nonspecific, may be reactive. Inferior         XR CHEST PORT   Final Result   1. There is residual airspace disease versus atelectasis within the right mid   to lower lung zones without short-term interval changes. 2.  There has been partial evacuation of a  right-sided empyema with moderate   residual areas of loculated pleural fluid and gas without short-term interval   changes. 3.  On recent CT imaging from 12/8/2021, there was demonstrated a massive right   pleural effusion as well as tree-in-bud nodular densities within the left lung. These findings can be associated with tuberculosis and nontuberculous   mycobacterial infections. Please correlate clinically. XR CHEST PORT   Final Result      XR CHEST PORT   Final Result      XR CHEST PORT   Final Result      XR CHEST PORT   Final Result      IR THORACENTESIS/INSERT CHEST TUBE   Final Result   Ultrasound of the right upper back was performed with images stored in PACS,   demonstrating heterogeneous collection most consistent with emphysema. Successful US-guided 8.5 percutaneous drainage catheter placement into right   empyema. Plan:   Continue flushing the catheter using 10 ml sterile normal saline and record the   output at least once a day. Consider catheter removal if there is less than 20 mL output over 24 hours. XR CHEST PORT    (Results Pending)   XR CHEST PORT    (Results Pending)   XR CHEST PORT    (Results Pending)   XR CHEST PORT    (Results Pending)        PHYSICAL EXAM:    Physical Exam  Vitals reviewed. Constitutional:       General: He is not in acute distress. Appearance: He is not ill-appearing. HENT:      Head: Normocephalic and atraumatic.       Mouth/Throat:      Mouth: Mucous membranes are moist.      Pharynx: Oropharynx is clear. Eyes:      Conjunctiva/sclera: Conjunctivae normal.   Cardiovascular:      Rate and Rhythm: Normal rate and regular rhythm. Heart sounds: Normal heart sounds. Pulmonary:      Effort: Pulmonary effort is normal.      Comments:  diminished breath sounds in the right lung. Abdominal:      General: Abdomen is flat. Bowel sounds are normal.      Palpations: Abdomen is soft. Musculoskeletal:         General: Normal range of motion. Cervical back: Normal range of motion and neck supple. Skin:     General: Skin is warm. Neurological:      General: No focal deficit present. Mental Status: He is alert and oriented to person, place, and time. Mental status is at baseline. Psychiatric:         Mood and Affect: Mood normal.          Data Review    Recent Results (from the past 24 hour(s))   CBC WITH AUTOMATED DIFF    Collection Time: 12/23/21  8:47 AM   Result Value Ref Range    WBC 15.7 (H) 4.1 - 11.1 K/uL    RBC 3.53 (L) 4.10 - 5.70 M/uL    HGB 9.8 (L) 12.1 - 17.0 g/dL    HCT 29.8 (L) 36.6 - 50.3 %    MCV 84.4 80.0 - 99.0 FL    MCH 27.8 26.0 - 34.0 PG    MCHC 32.9 30.0 - 36.5 g/dL    RDW 13.2 11.5 - 14.5 %    PLATELET 836 (H) 039 - 400 K/uL    MPV 9.6 8.9 - 12.9 FL    NRBC 0.0 0.0  WBC    ABSOLUTE NRBC 0.00 0.00 - 0.01 K/uL    NEUTROPHILS 78 (H) 32 - 75 %    LYMPHOCYTES 11 (L) 12 - 49 %    MONOCYTES 8 5 - 13 %    EOSINOPHILS 1 0 - 7 %    BASOPHILS 1 0 - 1 %    IMMATURE GRANULOCYTES 1 (H) 0 - 0.5 %    ABS. NEUTROPHILS 12.4 (H) 1.8 - 8.0 K/UL    ABS. LYMPHOCYTES 1.7 0.8 - 3.5 K/UL    ABS. MONOCYTES 1.2 (H) 0.0 - 1.0 K/UL    ABS. EOSINOPHILS 0.2 0.0 - 0.4 K/UL    ABS. BASOPHILS 0.1 0.0 - 0.1 K/UL    ABS. IMM.  GRANS. 0.1 (H) 0.00 - 0.04 K/UL    DF AUTOMATED          Assessment/Plan:     Active Problems:    Pleural effusion (12/8/2021)      This a 57-year-old male admitted on 12/8/2021 with a history of a hypertension and asthma who was transferred from [facility] due to worsening shortness of breath and productive cough. Patient with leukocytosis and a CT scan of the chest showing a large multiloculated right pleural effusion consistent with an empyema. There was also a left apical nodule and tree-in-bud appearance with multiple nodules in the left lower lobe. Patient with acute respiratory failure requiring supplemental oxygenation. Patient was found to be a thoracentesis to have Streptococcus angiosis. Infectious disease consult, Dr. Delgadillo currently treating with Zosyn. Decortication planned for 12/21/2021 by Dr. Tere Aranda. Patient with an indwelling chest tube and purulent drainage noted from the right chest.  AFB smear negative, Quantiferon negative as well as acid-fast bacilli culture. Blood cultures have been negative. Pulmonary consultation, Dr. Tyrell Sepulveda. Patient underwent VATS with right thoracotomy and decortication on 12/21/2021 by Dr. Jeanmarie Benson. Post procedure pneumothorax    Impression\plan:    1. Right multiloculated pleural effusion consistent with empyema  Cardiothoracic surgery and decortication 12/21/2021  Continue Zosyn  ID consultation  Pulmonary consultation  Postprocedural pneumothorax and acute respiratory failure with hypoxia. 2 chest tubes placed during the decortication  Pain management  Subdiaphragmatic air as surgery required intraperitoneal approach for decortication    2. Sepsis  Secondary #1    3. History of alcohol and tobacco abuse  Continue Ativan as needed  Nicotine patch    4.  Essential hypertension  Continue Norvasc    CODE STATUS: Full code    DVT prophylaxis: Lovenox on hold  Ulcer prophylaxis: Not indicated    Discharge barriers:Pain control    Care Plan discussed with: Patient/Family    Total time spent with patient: >35 minutes.

## 2021-12-23 NOTE — PROGRESS NOTES
Infectious Diseases Progress Note  Cory South MD  [phone]  Date:2021       Room:[facility]  Patient Name:Ricky Rubbie Klinefelter     YOB: 1970     Age:51 y.o. 51M with history of hypertension, bronchitis, asthma. Long history of smoking. He reports prior vaccinations against coronavirus.     21 admitted to [facility] from [facility] after he was found to have a pleural effusion. He presents with two weeks of productive cough, shortness of breath, fevers, chills, generalized weakness. During the ED course underwent CT imaging which revealed large multiloculated right pleural effusion.     21 CT Chest  IMPRESSION  Large multiloculated right pleural effusion with subsequent near complete  atelectasis of the right lung. Therapeutic and diagnostic thoracentesis  recommended. Tree-in-bud centrilobular nodules present throughout the left lower lobe with  larger nodular airspace opacity apical posterior segment left upper lobe. Findings consistent with bronchiolitis/bronchopneumonia. The larger airspace  nodule in the apical posterior segment left upper lobe require surveillance.     21 underwent IR thoracentesis  IMPRESSION  Ultrasound of the right upper back was performed with images stored in PACS,  demonstrating heterogeneous collection most consistent with emphysema. Successful US-guided 8.5 percutaneous drainage catheter placement into right  empyema.      Pleural cultures reveal Streptococcus anginosus and I have been asked to see him in consultation. 21 taken to OR by Dr Natalee Smith for surgical management of Empyema  Procedure(s):  RIGHT VIDEO ASSISTED THORACIC SURGERY, Right THORACOTOMY, DECORTICATION     Subjective    Subjective:  Symptoms:  Stable. Review of Systems   All other systems reviewed and are negative.     Objective         Vitals Last 24 Hours:  TEMPERATURE:  Temp  Av.3 °F (37.4 °C)  Min: 98.4 °F (36.9 °C)  Max: 100.4 °F (38 °C)  RESPIRATIONS RANGE: Resp  Avg: 17.2  Min: 16  Max: 20  PULSE OXIMETRY RANGE: SpO2  Av.1 %  Min: 97 %  Max: 100 %  PULSE RANGE: Pulse  Av.9  Min: 80  Max: 96  BLOOD PRESSURE RANGE: Systolic (16AHT), WCC:662 , Min:111 , IFN:647   ; Diastolic (30QZE), IOK:36, Min:67, Max:77    I/O (24Hr): Intake/Output Summary (Last 24 hours) at 2021 1518  Last data filed at 2021 1312  Gross per 24 hour   Intake 907.3 ml   Output 2200 ml   Net -1292.7 ml     Objective:  General Appearance:  Comfortable. Vital signs: (most recent): Blood pressure 123/69, pulse 87, temperature 99.8 °F (37.7 °C), resp. rate 20, height 5' 11\" (1.803 m), weight 74.5 kg (164 lb 3.9 oz), SpO2 100 %. Vital signs are normal.    HEENT: Normal HEENT exam.    Lungs:  Normal effort and normal respiratory rate. Heart: Normal rate. Regular rhythm. Abdomen: Abdomen is soft. Bowel sounds are normal.     Extremities: Normal range of motion. Pulses: Distal pulses are intact. Neurological: Patient is alert. Pupils:  Pupils are equal, round, and reactive to light. Skin:  Warm and dry. Labs/Imaging/Diagnostics    Labs:  CBC:  Recent Labs     21  0847 21  0824 21  1250   WBC 15.7* 18.3* 27.1*   RBC 3.53* 3.69* 4.15   HGB 9.8* 10.2* 11.6*   HCT 29.8* 31.1* 35.6*   MCV 84.4 84.3 85.8   RDW 13.2 13.4 13.2   * 557* 688*     CHEMISTRIES:  Recent Labs     21  0824 21  0822     --    K 4.1  --      --    CO2 26  --    BUN 11  --    CA 8.0* 8.1*   MG  --  2.0   PT/INR:No results for input(s): INR, INREXT, INREXT in the last 72 hours. No lab exists for component: PROTIME  APTT:No results for input(s): APTT in the last 72 hours. LIVER PROFILE:  Recent Labs     21   AST 45*   ALT 39     Lab Results   Component Value Date/Time    ALT (SGPT) 39 2021 08:24 AM    AST (SGOT) 45 (H) 2021 08:24 AM    Alk.  phosphatase 207 (H) 2021 08:24 AM    Bilirubin, total 0.4 2021 08:24 AM Imaging Last 24 Hours:  XR CHEST PORT    Result Date: 12/23/2021  Chest single view. Comparison single view chest December 22, 2021. Unchanged right-sided chest tubes. Small volume subcutaneous air right chest wall and neck. Similar right hydropneumothorax. Apical pleural separation 1.2 cm. Right hemidiaphragm is obscured related to pleural fluid and basilar atelectasis. Linear atelectasis left lower lobe lung unchanged. Cardiac and mediastinal structures unchanged. Stable subdiaphragmatic air. Assessment//Plan   Active Problems:    Pleural effusion (12/8/2021)      Assessment & Plan   51M with history of hypertension, bronchitis, asthma. Long history of smoking. He reports prior vaccinations against coronavirus.     12/8/21 admitted to [facility] from [facility] after he was found to have a pleural effusion. He presents with two weeks of productive cough, shortness of breath, fevers, chills, generalized weakness. During the ED course underwent CT imaging which revealed large multiloculated right pleural effusion.     12/8/21 CT Chest  IMPRESSION  Large multiloculated right pleural effusion with subsequent near complete  atelectasis of the right lung. Therapeutic and diagnostic thoracentesis  recommended. Tree-in-bud centrilobular nodules present throughout the left lower lobe with  larger nodular airspace opacity apical posterior segment left upper lobe. Findings consistent with bronchiolitis/bronchopneumonia. The larger airspace  nodule in the apical posterior segment left upper lobe require surveillance.     12/9/21 underwent IR thoracentesis  IMPRESSION  Ultrasound of the right upper back was performed with images stored in PACS,  demonstrating heterogeneous collection most consistent with emphysema. Successful US-guided 8.5 percutaneous drainage catheter placement into right  empyema.      Pleural cultures reveal Streptococcus anginosus and I have been asked to see him in consultation.     12/21/21 taken to OR by Dr Lori De Leon for surgical management of Empyema  Procedure(s):  RIGHT VIDEO ASSISTED THORACIC SURGERY, Right THORACOTOMY, DECORTICATION     MICROBIOLOGY     12/8/21            Covid19           Negative  12/8/21            Blood               Negative  12/9/21            Blood               Negative     12/9/21            IR pleural         Heavy Streptococcus anginosus     12/16/21          Quant TB         Negative    12/16/21 Pleural  AFB smear negative, AFB culture pending      Fungal negative so far    12/21/21 Pleural OR Negative so far  12/21/21 OR fungal Pending  12/21/21 Urine  Negative     ASSESSMENT AND RECOMMENDATIONS     1) Empyema presenting as complex loculated pleural effusion in the setting of advanced aspiration pneumonia.  Status post VATS decortication for empyema as above.                 Zosyn iv while in hospital     Once stable for discharge, oral Augmentin to complete outpatient course                          Augmentin 875mg po bid through 1/7/22       Electronically signed by Stephen Perez MD on 12/23/2021 at 12:48 PM

## 2021-12-24 ENCOUNTER — APPOINTMENT (OUTPATIENT)
Dept: GENERAL RADIOLOGY | Age: 51
DRG: 853 | End: 2021-12-24
Attending: THORACIC SURGERY (CARDIOTHORACIC VASCULAR SURGERY)
Payer: COMMERCIAL

## 2021-12-24 LAB
BASOPHILS # BLD: 0.1 K/UL (ref 0–0.1)
BASOPHILS NFR BLD: 1 % (ref 0–1)
DIFFERENTIAL METHOD BLD: ABNORMAL
EOSINOPHIL # BLD: 0.2 K/UL (ref 0–0.4)
EOSINOPHIL NFR BLD: 1 % (ref 0–7)
ERYTHROCYTE [DISTWIDTH] IN BLOOD BY AUTOMATED COUNT: 13 % (ref 11.5–14.5)
HCT VFR BLD AUTO: 29.3 % (ref 36.6–50.3)
HGB BLD-MCNC: 9.9 G/DL (ref 12.1–17)
IMM GRANULOCYTES # BLD AUTO: 0.1 K/UL (ref 0–0.04)
IMM GRANULOCYTES NFR BLD AUTO: 1 % (ref 0–0.5)
LYMPHOCYTES # BLD: 1.7 K/UL (ref 0.8–3.5)
LYMPHOCYTES NFR BLD: 12 % (ref 12–49)
MCH RBC QN AUTO: 28.1 PG (ref 26–34)
MCHC RBC AUTO-ENTMCNC: 33.8 G/DL (ref 30–36.5)
MCV RBC AUTO: 83.2 FL (ref 80–99)
MONOCYTES # BLD: 1.2 K/UL (ref 0–1)
MONOCYTES NFR BLD: 8 % (ref 5–13)
NEUTS SEG # BLD: 11.5 K/UL (ref 1.8–8)
NEUTS SEG NFR BLD: 77 % (ref 32–75)
NRBC # BLD: 0 K/UL (ref 0–0.01)
NRBC BLD-RTO: 0 PER 100 WBC
PLATELET # BLD AUTO: 571 K/UL (ref 150–400)
PMV BLD AUTO: 9.6 FL (ref 8.9–12.9)
RBC # BLD AUTO: 3.52 M/UL (ref 4.1–5.7)
WBC # BLD AUTO: 14.8 K/UL (ref 4.1–11.1)

## 2021-12-24 PROCEDURE — 74011250637 HC RX REV CODE- 250/637: Performed by: INTERNAL MEDICINE

## 2021-12-24 PROCEDURE — 74011000258 HC RX REV CODE- 258: Performed by: INTERNAL MEDICINE

## 2021-12-24 PROCEDURE — 74011250636 HC RX REV CODE- 250/636: Performed by: INTERNAL MEDICINE

## 2021-12-24 PROCEDURE — 74011250637 HC RX REV CODE- 250/637: Performed by: THORACIC SURGERY (CARDIOTHORACIC VASCULAR SURGERY)

## 2021-12-24 PROCEDURE — 65270000029 HC RM PRIVATE

## 2021-12-24 PROCEDURE — 77010033678 HC OXYGEN DAILY

## 2021-12-24 PROCEDURE — 85025 COMPLETE CBC W/AUTO DIFF WBC: CPT

## 2021-12-24 PROCEDURE — 71045 X-RAY EXAM CHEST 1 VIEW: CPT

## 2021-12-24 PROCEDURE — 74011250637 HC RX REV CODE- 250/637: Performed by: NURSE PRACTITIONER

## 2021-12-24 PROCEDURE — 74011000250 HC RX REV CODE- 250: Performed by: THORACIC SURGERY (CARDIOTHORACIC VASCULAR SURGERY)

## 2021-12-24 PROCEDURE — 74011000250 HC RX REV CODE- 250: Performed by: ANESTHESIOLOGY

## 2021-12-24 PROCEDURE — 94760 N-INVAS EAR/PLS OXIMETRY 1: CPT

## 2021-12-24 PROCEDURE — 74011250636 HC RX REV CODE- 250/636: Performed by: THORACIC SURGERY (CARDIOTHORACIC VASCULAR SURGERY)

## 2021-12-24 PROCEDURE — 36415 COLL VENOUS BLD VENIPUNCTURE: CPT

## 2021-12-24 RX ORDER — OXYCODONE AND ACETAMINOPHEN 5; 325 MG/1; MG/1
2 TABLET ORAL
Status: DISCONTINUED | OUTPATIENT
Start: 2021-12-25 | End: 2021-12-27 | Stop reason: HOSPADM

## 2021-12-24 RX ADMIN — FAMOTIDINE 20 MG: 20 TABLET ORAL at 10:25

## 2021-12-24 RX ADMIN — PIPERACILLIN SODIUM AND TAZOBACTAM SODIUM 3.38 G: 3; .375 INJECTION, POWDER, LYOPHILIZED, FOR SOLUTION INTRAVENOUS at 18:08

## 2021-12-24 RX ADMIN — Medication 5 ML/HR: at 00:43

## 2021-12-24 RX ADMIN — MULTIVITAMIN TABLET 1 TABLET: TABLET at 12:23

## 2021-12-24 RX ADMIN — Medication 5 ML/HR: at 12:27

## 2021-12-24 RX ADMIN — KETOROLAC TROMETHAMINE 15 MG: 15 INJECTION, SOLUTION INTRAMUSCULAR; INTRAVENOUS at 06:00

## 2021-12-24 RX ADMIN — PIPERACILLIN SODIUM AND TAZOBACTAM SODIUM 3.38 G: 3; .375 INJECTION, POWDER, LYOPHILIZED, FOR SOLUTION INTRAVENOUS at 10:22

## 2021-12-24 RX ADMIN — KETOROLAC TROMETHAMINE 15 MG: 15 INJECTION, SOLUTION INTRAMUSCULAR; INTRAVENOUS at 18:08

## 2021-12-24 RX ADMIN — Medication 5 ML/HR: at 22:59

## 2021-12-24 RX ADMIN — FAMOTIDINE 20 MG: 20 TABLET ORAL at 21:04

## 2021-12-24 RX ADMIN — PIPERACILLIN SODIUM AND TAZOBACTAM SODIUM 3.38 G: 3; .375 INJECTION, POWDER, LYOPHILIZED, FOR SOLUTION INTRAVENOUS at 01:43

## 2021-12-24 RX ADMIN — FERROUS SULFATE TAB 325 MG (65 MG ELEMENTAL FE) 325 MG: 325 (65 FE) TAB at 10:24

## 2021-12-24 RX ADMIN — KETOROLAC TROMETHAMINE 15 MG: 15 INJECTION, SOLUTION INTRAMUSCULAR; INTRAVENOUS at 00:42

## 2021-12-24 RX ADMIN — AMLODIPINE BESYLATE 5 MG: 5 TABLET ORAL at 10:25

## 2021-12-24 RX ADMIN — KETOROLAC TROMETHAMINE 15 MG: 15 INJECTION, SOLUTION INTRAMUSCULAR; INTRAVENOUS at 12:23

## 2021-12-24 RX ADMIN — THIAMINE HCL TAB 100 MG 100 MG: 100 TAB at 10:25

## 2021-12-24 NOTE — PROGRESS NOTES
Infectious Diseases Progress Note  Cory Mcintyre MD  [phone]  Date:2021       Room:[facility]  Patient Name:Jason Bender     YOB: 1970     Age:51 y.o. 51M with history of hypertension, bronchitis, asthma. Long history of smoking. He reports prior vaccinations against coronavirus.     21 admitted to [facility] from [facility] after he was found to have a pleural effusion. He presents with two weeks of productive cough, shortness of breath, fevers, chills, generalized weakness. During the ED course underwent CT imaging which revealed large multiloculated right pleural effusion.     21 CT Chest  IMPRESSION  Large multiloculated right pleural effusion with subsequent near complete  atelectasis of the right lung. Therapeutic and diagnostic thoracentesis  recommended. Tree-in-bud centrilobular nodules present throughout the left lower lobe with  larger nodular airspace opacity apical posterior segment left upper lobe. Findings consistent with bronchiolitis/bronchopneumonia. The larger airspace  nodule in the apical posterior segment left upper lobe require surveillance.     21 underwent IR thoracentesis  IMPRESSION  Ultrasound of the right upper back was performed with images stored in PACS,  demonstrating heterogeneous collection most consistent with emphysema. Successful US-guided 8.5 percutaneous drainage catheter placement into right  empyema.      Pleural cultures reveal Streptococcus anginosus and I have been asked to see him in consultation. 21 taken to OR by Dr Mehreen Villanueva for surgical management of Empyema  Procedure(s):  RIGHT VIDEO ASSISTED THORACIC SURGERY, Right THORACOTOMY, DECORTICATION     Subjective    Subjective:  Symptoms:  Stable. Review of Systems   All other systems reviewed and are negative.     Objective         Vitals Last 24 Hours:  TEMPERATURE:  Temp  Av.3 °F (36.8 °C)  Min: 98 °F (36.7 °C)  Max: 98.5 °F (36.9 °C)  RESPIRATIONS RANGE: Resp  Av Min: 20  Max: 20  PULSE OXIMETRY RANGE: SpO2  Av %  Min: 94 %  Max: 100 %  PULSE RANGE: Pulse  Av.3  Min: 79  Max: 88  BLOOD PRESSURE RANGE: Systolic (48STE), NVF:250 , Min:107 , RRB:227   ; Diastolic (74OBU), UUA:12, Min:62, Max:72    I/O (24Hr): Intake/Output Summary (Last 24 hours) at 2021 1445  Last data filed at 2021 1146  Gross per 24 hour   Intake 637.2 ml   Output 2870 ml   Net -2232.8 ml     Objective:  General Appearance:  Comfortable. Vital signs: (most recent): Blood pressure 125/62, pulse 86, temperature 98 °F (36.7 °C), resp. rate 20, height 5' 11\" (1.803 m), weight 74.5 kg (164 lb 3.9 oz), SpO2 100 %. Vital signs are normal.    HEENT: Normal HEENT exam.    Lungs:  Normal effort and normal respiratory rate. Heart: Normal rate. Regular rhythm. Abdomen: Abdomen is soft. Bowel sounds are normal.     Extremities: Normal range of motion. Pulses: Distal pulses are intact. Neurological: Patient is alert. Pupils:  Pupils are equal, round, and reactive to light. Skin:  Warm and dry. Labs/Imaging/Diagnostics    Labs:  CBC:  Recent Labs     21  1004 21  0847 21  0824   WBC 14.8* 15.7* 18.3*   RBC 3.52* 3.53* 3.69*   HGB 9.9* 9.8* 10.2*   HCT 29.3* 29.8* 31.1*   MCV 83.2 84.4 84.3   RDW 13.0 13.2 13.4   * 514* 557*     CHEMISTRIES:  Recent Labs     21  0824 21  0822     --    K 4.1  --      --    CO2 26  --    BUN 11  --    CA 8.0* 8.1*   MG  --  2.0   PT/INR:No results for input(s): INR, INREXT, INREXT in the last 72 hours. No lab exists for component: PROTIME  APTT:No results for input(s): APTT in the last 72 hours. LIVER PROFILE:  Recent Labs     21   AST 45*   ALT 39     Lab Results   Component Value Date/Time    ALT (SGPT) 39 2021 08:24 AM    AST (SGOT) 45 (H) 2021 08:24 AM    Alk.  phosphatase 207 (H) 2021 08:24 AM    Bilirubin, total 0.4 2021 08:24 AM       Imaging Last 24 Hours:  XR CHEST PORT    Result Date: 12/24/2021  AP portable chest, 0857 hours. Comparison: 12/23/2021. Findings: Cardiac monitoring leads overlie the chest. The 2 right-sided chest tubes are stable. There is mild subcutaneous emphysema along the right chest wall. There is a small right basilar pneumothorax with 3 mm of pleural separation, previously 8 mm. The heart is normal in size. There is moderate airspace disease at the right lung base. There is mild airspace disease at the left lung base. No pleural effusion is identified. There are senescent changes within the spine. 1. Smaller right basilar pneumothorax. 2. Moderate right basilar airspace disease, similar to the prior exam. 3. Mild left basilar airspace disease, similar to the prior exam.    Assessment//Plan   Active Problems:    Pleural effusion (12/8/2021)      Assessment & Plan   51M with history of hypertension, bronchitis, asthma. Long history of smoking. He reports prior vaccinations against coronavirus.     12/8/21 admitted to [facility] from [facility] after he was found to have a pleural effusion. He presents with two weeks of productive cough, shortness of breath, fevers, chills, generalized weakness. During the ED course underwent CT imaging which revealed large multiloculated right pleural effusion.     12/8/21 CT Chest  IMPRESSION  Large multiloculated right pleural effusion with subsequent near complete  atelectasis of the right lung. Therapeutic and diagnostic thoracentesis  recommended. Tree-in-bud centrilobular nodules present throughout the left lower lobe with  larger nodular airspace opacity apical posterior segment left upper lobe. Findings consistent with bronchiolitis/bronchopneumonia.  The larger airspace  nodule in the apical posterior segment left upper lobe require surveillance.     12/9/21 underwent IR thoracentesis  IMPRESSION  Ultrasound of the right upper back was performed with images stored in PACS,  demonstrating heterogeneous collection most consistent with emphysema. Successful US-guided 8.5 percutaneous drainage catheter placement into right  empyema.      Pleural cultures reveal Streptococcus anginosus and I have been asked to see him in consultation. 12/21/21 taken to OR by Dr Florence Don for surgical management of Empyema  Procedure(s):  RIGHT VIDEO ASSISTED THORACIC SURGERY, Right THORACOTOMY, DECORTICATION     MICROBIOLOGY     12/8/21            Covid19           Negative  12/8/21            Blood               Negative  12/9/21            Blood               Negative     12/9/21            IR pleural         Heavy Streptococcus anginosus     12/16/21          Quant TB         Negative    12/16/21 Pleural  AFB smear negative, AFB culture pending      Fungal negative so far    12/21/21 Pleural OR Negative so far  12/21/21 OR fungal Pending  12/21/21 Urine  Negative     ASSESSMENT AND RECOMMENDATIONS     1) Empyema presenting as complex loculated pleural effusion in the setting of advanced aspiration pneumonia.  Status post VATS decortication for empyema as above.                 Zosyn iv while in hospital     Once stable for discharge, oral Augmentin to complete outpatient course                          Augmentin 875mg po bid through 1/7/22       Electronically signed by Darya Estes MD on 12/24/2021 at 12:48 PM

## 2021-12-24 NOTE — PROGRESS NOTES
Pulmonary, Critical Care    Name: Deng Rueda MRN: [id number]   : 1970 Hospital: [facility]   Date: 2021  Admission date: 2021 Hospital Day: 17       Subjective/Interval History:   Seen on the medical floor awake alert. History is 2 or so weeks of breathing difficulty. Started with severe chest pain pleuritic with chills and sweats he did not take his temperature but felt hot at times. He has had continued worsening presented to the emergency room has a large right pleural effusion. CT scan suggests multiloculated effusion. Leukocytosis up to 34,000 all consistent with empyema. He is a drinker of 1-2 sixpacks a day. Does drink to the point of falling asleep although he states he is never passed out. 12/10 percutaneous drainage catheter placed by IR yesterday with 1600 cc drainage overnight and so far today 700   chest catheter continues to drain he states he feels a little better. Chest x-ray today has small apical pneumothorax with significant amount of remaining pleural fluid   awake alert states he feels better   no specific complaints respirations   for decortication on Tuesday    Hospital Problems  Date Reviewed: 2021          Codes Class Noted POA    Pleural effusion ICD-10-CM: J90  ICD-9-CM: 511.9  2021 Unknown              IMPRESSION:   1. Status post right-sided VATS right thoracotomy with decortication  2. S/P empyema  3. Chest tube in place  4. Alcohol abuse  5. COPD exacerbation  Body mass index is 22.91 kg/m². RECOMMENDATIONS/PLAN:   1. Pleural fluid loculated positive for Streptococcus anguosus  2. Status post VATS right thoracotomy and decortication, he is comfortable alert awake not in any distress  3. Chest tube in place  4. No wheezing  bronchodilators now inhalers  5.  Continue IV Zosyn WBC count continues to improve           [x] High complexity decision making was performed  [x] See my orders for details      Subjective/Initial History:     I was asked by Justo Dallas MD to see Jose Garcia  a 46 y.o.  male in consultation for a chief complaint of multiloculated right pleural effusion likely empyema      No Known Allergies     MAR reviewed and pertinent medications noted or modified as needed     Current Facility-Administered Medications   Medication    [START ON 12/25/2021] oxyCODONE-acetaminophen (PERCOCET) 5-325 mg per tablet 2 Tablet    multivitamin with folic acid (ONE DAILY WITH FOLIC ACID) tablet 1 Tablet    ferrous sulfate tablet 325 mg    ketorolac (TORADOL) injection 15 mg    famotidine (PEPCID) tablet 20 mg    naloxone (NARCAN) injection 0.4 mg    diphenhydrAMINE (BENADRYL) injection 12.5 mg    fentaNYL 2mcg/mL - bupivacaine 0.125% pf epidural    nicotine (NICODERM CQ) 21 mg/24 hr patch 1 Patch    albuterol (PROVENTIL HFA, VENTOLIN HFA, PROAIR HFA) inhaler 2 Puff    budesonide-formoteroL (SYMBICORT) 160-4.5 mcg/actuation HFA inhaler 2 Puff    amLODIPine (NORVASC) tablet 5 mg    ondansetron (ZOFRAN ODT) tablet 4 mg    piperacillin-tazobactam (ZOSYN) 3.375 g in 0.9% sodium chloride (MBP/ADV) 100 mL MBP    thiamine mononitrate (B-1) tablet 100 mg      Patient PCP: None  PMH:  has a past medical history of Asthma and Ill-defined condition. PSH:   has a past surgical history that includes ir thoracentesis/insert chest tube (12/9/2021). FHX: family history is not on file. SHX:  reports that he has quit smoking. He has never used smokeless tobacco. He reports current alcohol use. He reports previous drug use. To me he admits smoking 1 to 2 packs/day ever since he was 8 or 10.   He drinks 1-2 sixpacks a day beer     Systemic review:  General states his weight stable has been having chills and fever for the last 2 weeks or more  Eyes no double vision or momentary blindness  ENT no drainage or facial pain  Musculoskeletal no swollen tender joints  Endocrinologic no polyuria polydipsia  Neurologic no seizures or syncope  Gastrointestinal no nausea vomiting acid indigestion. Genitourinary no pain or discomfort on urination  Cardiovascular no history of heart disease or ankle edema he has had the pleuritic chest pain no diaphoresis  Respiratory as mentioned cough some yellow sputum fever chills shortness of breath and pleuritic pain on the right    Objective:     Vital Signs: Telemetry:    normal sinus rhythm Intake/Output:   Visit Vitals  /62   Pulse 86   Temp 98 °F (36.7 °C)   Resp 20   Ht 5' 11\" (1.803 m)   Wt 74.5 kg (164 lb 3.9 oz)   SpO2 100%   BMI 22.91 kg/m²       Temp (24hrs), Av.7 °F (37.1 °C), Min:98 °F (36.7 °C), Max:99.8 °F (37.7 °C)        O2 Device: Nasal cannula O2 Flow Rate (L/min): 1 l/min       Wt Readings from Last 4 Encounters:   12/15/21 74.5 kg (164 lb 3.9 oz)   21 73.5 kg (162 lb)          Intake/Output Summary (Last 24 hours) at 2021 1010  Last data filed at 2021 0539  Gross per 24 hour   Intake 637.2 ml   Output 1570 ml   Net -932.8 ml       Last shift:      No intake/output data recorded. Last 3 shifts:  1901 -  0700  In: 1544.5 [P.O.:250; I.V.:1294.5]  Out: 3100 [Urine:2750]       Physical Exam:   General:  male; lying in bed no distress no accessory muscle usage  HEENT: NCAT, poor dentition,  Eyes: anicteric; conjunctiva clear extraocular movements intact  Neck: no nodes, no JVD, no accessory MM use. Chest: no deformity,   Cardiac: Regular rate and rhythm  Lungs: Markedly improved breath sounds on the right present anteriorly and upper lung posteriorly and developing some in the right base now, left side fairly clear  Abd: Thin soft positive bowel sounds  Ext: no edema; no joint swelling;  No clubbing  : clear urine  Neuro: Awake alert calm speech is clear moves all 4 extremities  Psych- no agitation, oriented to person;   Skin: warm, dry, no cyanosis;   Pulses: Brachial radial pulses intact  Capillary: Normal capillary refill    Labs:    Recent Labs     12/23/21  0847 12/22/21  0824 12/21/21  1250   WBC 15.7* 18.3* 27.1*   HGB 9.8* 10.2* 11.6*   * 557* 688*     Recent Labs     12/22/21  0824 12/22/21  0822     --    K 4.1  --      --    CO2 26  --    *  --    BUN 11  --    CREA 0.57*  --    CA 8.0* 8.1*   MG  --  2.0   ALB 1.3*  --    ALT 39  --    . Pleural fluid WBC count 47,840 with 56% polys 32% lymphs pleural fluid protein 0.8 glucose 4  Pleural culture with  microaerophilic Streptococcus    Lab Results   Component Value Date/Time    Culture result: No growth thus far 12/21/2021 09:45 AM    Culture result: No Growth (<1000 cfu/mL) 12/21/2021 09:30 AM    Culture result: No fungus isolated 3 days 12/16/2021 05:45 PM     Imaging:    CXR Results  (Last 48 hours)               12/12/21 0854  XR CHEST PORT Final result    Narrative:  Chest single view. Comparison single view chest December 11, 2021. Similar appearance for the right hemithorax. Predominant right basilar airspace opacity, similar volume. Unchanged hydropneumothorax. Similar degree apical pleural separation. Left lung aerated. Cardiac and mediastinal structures unchanged. To me the x-ray is improved you can start to see the right hemidiaphragm now implying further decrease in effusion in the apical pneumothorax is minimally improved       12/11/21 0752  XR CHEST PORT Final result    Narrative:  Chest single view. Comparison single view chest December 8, 2021. Right-sided hydropneumothorax. Estimated moderate volume pleural fluid. 1.1 cm   apical pleural separation. Left lung aerated. Cardiac and mediastinal structures   unchanged.            Results from [place] encounter on 12/08/21    XR CHEST PORT    Narrative  Exam: Single view frontal chest    Comparison: Prior exams dating to  12/17/2021, the most recent earlier today    Findings: Right chest tubes appear stable. There is probable free  subdiaphragmatic gas. A right base chest tube overlies the free air. Diminished  right pneumothorax since earlier today. The apex of the lung is now 2.3 cm from  the superior chest wall. Mediastinum remains midline. Persistent though  diminished right lung atelectasis. No developing pulmonary parenchymal or  pleural lesion. Impression  1. Diminished right pneumothorax. 2. Probable free subdiaphragmatic gas in this patient with a right base chest  tube. 3. I reported the findings to the patient's nurse by telephone at 8:50 PM on  12/21/2021. The nurse indicated that she would report the findings to the  attending physician. XR CHEST PORT    Narrative  Chest single view. Comparison single view chest December 20, 2021. Post surgery. Newly placed right-sided chest tubes x2. Small volume right chest wall subcutaneous air. Right side pneumothorax, 4.2 cm apical pleural separation. Atelectatic right  lung. Left lung aerated. Cardiac and mediastinal structures unchanged. XR CHEST PORT    Narrative  Chest single view. Comparison single view chest December 22, 2021. Unchanged right-sided chest tubes. Small volume subcutaneous air right chest wall and neck. Similar right hydropneumothorax. Apical pleural separation 1.2 cm. Right hemidiaphragm is obscured related to pleural fluid and basilar  atelectasis. Linear atelectasis left lower lobe lung unchanged. Cardiac and mediastinal structures unchanged. Stable subdiaphragmatic air. Results from [place] encounter on 12/08/21    CT CHEST WO CONT    Narrative  Noncontrast exam.  Comparison chest radiograph yesterday.     Dose Reduction:  All CT scans at this facility are performed using dose reduction optimization  techniques as appropriate to a performed exam including the following: Automated  exposure control, adjustments of the mA and/or kV according to patient size, or  use of iterative reconstruction technique. 6 x 6 x 3.5 cm loculated collection with an air-fluid level in the right apex. Complex densities in the right mid and lower lower lung including thick-walled  lesion containing central well-defined gas lucencies, potentially loculated 6 cm  fluid collection in the lateral right midlung, and small posterior inferior  fluid containing a pigtail drain and multiple gas bubbles in a pattern  suggesting that the fluid is highly viscous. Multiple air bronchograms are noted  within these complex densities. Small left pleural effusion and left basilar atelectasis. No anterior pneumothorax. Right hilar and mediastinal adenopathy, likely reactive. Atherosclerosis, including coronary artery calcification. Impression  Complex findings in the right hemithorax as above, question related to  infection. Small left pleural effusion and mild left basilar atelectasis. Right hilar and mild mediastinal adenopathy is nonspecific, may be reactive. Inferior      · 12/9 discussion 2-week history of pleuritic pain cough chills sweats fever and a drinker who will drink to the point of falling asleep most likely has aspiration pneumonia with empyema. IR has been consulted for joellen peters. He very likely will have multiple loculations preventing drainage and may need VATS  · 12/11 feels fine respirations nonlabored has just an occasional air bubble when he coughs chest x-ray with small apical pneumothorax significant fluid remains although it is certainly improved culture consistent with oral portillo to remain on Zosyn WBC count improved we will discontinue vancomycin  · 12/12 doing well at this point no air leak seen on chest tube and chest x-ray is mildly improved. Continue current treatment  · 12/13 drainage has decreased from the chest catheter. Will flush with saline and follow drainage.   Chest x-ray is improved diaphragm can be seen on the right with infiltrate in that area likely representing pneumonia cannot tell how much fluid remains  · 12/15 chest x-ray   · 12/17 on antibiotic no chest pain repeat chest x-ray shows infiltrate atelectasis right midlung and improvement in there right-sided pleural loculated effusion    Maryuri Lowe MD

## 2021-12-24 NOTE — PROGRESS NOTES
Hospitalist Progress Note    Subjective:   Daily Progress Note: 12/24/2021 11:13 AM    Hospital Course:  Loletha Phalen is a 77-year-old male admitted on 12/8/2021 with a history of a hypertension and asthma who was transferred from [facility] due to worsening shortness of breath and productive cough. Patient with leukocytosis. CT scan of the chest showing a large multiloculated right pleural effusion consistent with an empyema. There was also a left apical nodule and tree-in-bud appearance with multiple nodules in the left lower lobe. Patient with acute respiratory failure requiring supplemental oxygenation. Patient was found to be a thoracentesis to have Streptococcus angiosis. Infectious disease consult, Dr. Humza Mendosa currently treating with Zosyn. Decortication planned for 12/21/2021 by Dr. Barry Law. Patient with an indwelling chest tube and purulent drainage noted from the right chest.  AFB smear negative, Quantiferon negative as well as acid-fast bacilli culture. Blood cultures have been negative. Pulmonary consultation, Dr. Cristel Nolen. Patient underwent VATS with right thoracotomy and decortication on 12/21/2021 by Dr. Nydia Farias. Post procedure pneumothorax    Subjective:    Patient seen and examined at bedside. He denies any chest pain or shortness of breath this morning. Chest tube in place.      Current Facility-Administered Medications   Medication Dose Route Frequency    [START ON 12/25/2021] oxyCODONE-acetaminophen (PERCOCET) 5-325 mg per tablet 2 Tablet  2 Tablet Oral Q4H PRN    multivitamin with folic acid (ONE DAILY WITH FOLIC ACID) tablet 1 Tablet  1 Tablet Oral DAILY    ferrous sulfate tablet 325 mg  1 Tablet Oral DAILY WITH BREAKFAST    ketorolac (TORADOL) injection 15 mg  15 mg IntraVENous Q6H    famotidine (PEPCID) tablet 20 mg  20 mg Oral BID    naloxone (NARCAN) injection 0.4 mg  0.4 mg IntraVENous PRN    diphenhydrAMINE (BENADRYL) injection 12.5 mg  12.5 mg IntraVENous Q4H PRN    fentaNYL 2mcg/mL - bupivacaine 0.125% pf epidural  1-18 mL/hr Epidural TITRATE    nicotine (NICODERM CQ) 21 mg/24 hr patch 1 Patch  1 Patch TransDERmal DAILY    albuterol (PROVENTIL HFA, VENTOLIN HFA, PROAIR HFA) inhaler 2 Puff  2 Puff Inhalation Q6H PRN    budesonide-formoteroL (SYMBICORT) 160-4.5 mcg/actuation HFA inhaler 2 Puff  2 Puff Inhalation BID PRN    amLODIPine (NORVASC) tablet 5 mg  5 mg Oral DAILY    ondansetron (ZOFRAN ODT) tablet 4 mg  4 mg Oral Q8H PRN    piperacillin-tazobactam (ZOSYN) 3.375 g in 0.9% sodium chloride (MBP/ADV) 100 mL MBP  3.375 g IntraVENous Q8H    thiamine mononitrate (B-1) tablet 100 mg  100 mg Oral DAILY        Review of Systems  Constitutional: No fevers, No chills, No sweats, No fatigue, No Weakness  Eyes: No redness  Ears, nose, mouth, throat, and face: No nasal congestion, No sore throat, No voice change  Respiratory: No Shortness of Breath, No cough, No wheezing  Cardiovascular: Right pleuritic chest pain. No palpitations, No extremity edema  Gastrointestinal: No nausea, No vomiting, No diarrhea, No abdominal pain  Genitourinary: No frequency, No dysuria, No hematuria  Integument/breast: No skin lesion(s)   Neurological: No Confusion, No headaches, No dizziness      Objective:     Visit Vitals  /62   Pulse 86   Temp 98 °F (36.7 °C)   Resp 20   Ht 5' 11\" (1.803 m)   Wt 74.5 kg (164 lb 3.9 oz)   SpO2 100%   BMI 22.91 kg/m²    O2 Flow Rate (L/min): 1 l/min O2 Device: Nasal cannula    Temp (24hrs), Av.7 °F (37.1 °C), Min:98 °F (36.7 °C), Max:99.8 °F (37.7 °C)      No intake/output data recorded.  1901 -  0700  In: 1544.5 [P.O.:250; I.V.:1294.5]  Out: 3100 [Urine:2750]    PHYSICAL EXAM:  Constitutional: No acute distress  Skin: Extremities and face reveal no rashes. HEENT: Sclerae anicteric. Extra-occular muscles are intact. No oral ulcers. The neck is supple and no masses. Cardiovascular: Regular rate and rhythm. +S1/S2.  No murmur or gallop. Respiratory:  Right chest tube in place. Decreased air entry over entire right lung field. On supplemental oxygen. GI: Abdomen nondistended, soft, and nontender. Normal active bowel sounds. Rectal: Deferred   Musculoskeletal: No pitting edema of the lower legs. Able to move all ext  Neurological:  Patient is alert and oriented. Cranial nerves II-XII grossly intact  Psychiatric: Mood appears appropriate       Data Review    No results found for this or any previous visit (from the past 24 hour(s)). XR CHEST PORT   Final Result      XR CHEST PORT   Final Result      XR CHEST PORT   Final Result   1. Diminished right pneumothorax. 2. Probable free subdiaphragmatic gas in this patient with a right base chest   tube. 3. I reported the findings to the patient's nurse by telephone at 8:50 PM on   12/21/2021. The nurse indicated that she would report the findings to the   attending physician. XR CHEST PORT   Final Result      XR CHEST PORT   Final Result   No significant interval change. CT CHEST WO CONT   Final Result   Complex findings in the right hemithorax as above, question related to   infection. Small left pleural effusion and mild left basilar atelectasis. Right hilar and mild mediastinal adenopathy is nonspecific, may be reactive. Inferior         XR CHEST PORT   Final Result   1. There is residual airspace disease versus atelectasis within the right mid   to lower lung zones without short-term interval changes. 2.  There has been partial evacuation of a  right-sided empyema with moderate   residual areas of loculated pleural fluid and gas without short-term interval   changes. 3.  On recent CT imaging from 12/8/2021, there was demonstrated a massive right   pleural effusion as well as tree-in-bud nodular densities within the left lung. These findings can be associated with tuberculosis and nontuberculous   mycobacterial infections. Please correlate clinically.       XR CHEST PORT   Final Result      XR CHEST PORT   Final Result      XR CHEST PORT   Final Result      XR CHEST PORT   Final Result      IR THORACENTESIS/INSERT CHEST TUBE   Final Result   Ultrasound of the right upper back was performed with images stored in PACS,   demonstrating heterogeneous collection most consistent with emphysema. Successful US-guided 8.5 percutaneous drainage catheter placement into right   empyema. Plan:   Continue flushing the catheter using 10 ml sterile normal saline and record the   output at least once a day. Consider catheter removal if there is less than 20 mL output over 24 hours. XR CHEST PORT    (Results Pending)   XR CHEST PORT    (Results Pending)   XR CHEST PORT    (Results Pending)   XR CHEST PORT    (Results Pending)       Active Problems:    Pleural effusion (12/8/2021)        Assessment/Plan:     1. Right multiloculated pleural effusion consistent with empyema  Cardiothoracic surgery and decortication 12/21/2021  Continue Zosyn  ID consultation  Pulmonary consultation  Postprocedural pneumothorax and acute respiratory failure with hypoxia. 2 chest tubes placed during the decortication  Pain management  Subdiaphragmatic air as surgery required intraperitoneal approach for decortication  Plan for chest tube removal tomorrow or Sunday     2. Sepsis  Secondary #1     3. History of alcohol and tobacco abuse  Continue Ativan as needed  Nicotine patch     4.  Essential hypertension  Continue Norvasc        DVT prophylaxis: Lovenox on hold  Code Status: Full code  POA:    Discharge Barriers: Pending chest tube removal   Care Plan discussed with: patient and nursing    Total time spent with patient: >35 minutes.

## 2021-12-24 NOTE — PROGRESS NOTES
Post-OP Visit    Nahomy Cornell is a 46 y.o. male who presents with empyema, doing well no complaints tiny interment air leak,       /62   Pulse 86   Temp 98 °F (36.7 °C)   Resp 20   Ht 5' 11\" (1.803 m)   Wt 164 lb 3.9 oz (74.5 kg)   SpO2 100%   BMI 22.91 kg/m²     Physical Exam lungs clear, wounds OK     Problem List Items Addressed This Visit        Respiratory    Pleural effusion      Other Visit Diagnoses     Community acquired pneumonia of right lung, unspecified part of lung    -  Primary          Assessment and Plan: POD 3 Empyema, doing well, Plan epidural and whyte out Saturday wean off 0xygen on Saturday and home Sunday or Monday with tubes in place with po abx, of note is cultures from OR are still negative, which is great. I have filled out all of his disability papers.      Traci Barrios MD

## 2021-12-24 NOTE — PROGRESS NOTES
Problem: Falls - Risk of  Goal: *Absence of Falls  Description: Document Dory Led Fall Risk and appropriate interventions in the flowsheet.   Outcome: Progressing Towards Goal  Note: Fall Risk Interventions:  Mobility Interventions: Bed/chair exit alarm,Patient to call before getting OOB         Medication Interventions: Bed/chair exit alarm,Patient to call before getting OOB,Teach patient to arise slowly    Elimination Interventions: Bed/chair exit alarm,Call light in reach,Patient to call for help with toileting needs              Problem: Patient Education: Go to Patient Education Activity  Goal: Patient/Family Education  Outcome: Progressing Towards Goal     Problem: Pain  Goal: *Control of Pain  Outcome: Progressing Towards Goal  Goal: *PALLIATIVE CARE:  Alleviation of Pain  Outcome: Progressing Towards Goal     Problem: Patient Education: Go to Patient Education Activity  Goal: Patient/Family Education  Outcome: Progressing Towards Goal

## 2021-12-24 NOTE — PROGRESS NOTES
Post-OP Visit    Bridger Zepeda is a 46 y.o. male who presents with empyema doing well, sleeping now, not waking.        /62   Pulse 86   Temp 98 °F (36.7 °C)   Resp 20   Ht 5' 11\" (1.803 m)   Wt 164 lb 3.9 oz (74.5 kg)   SpO2 100%   BMI 22.91 kg/m²     Physical Exam none    Problem List Items Addressed This Visit        Respiratory    Pleural effusion      Other Visit Diagnoses     Community acquired pneumonia of right lung, unspecified part of lung    -  Primary          Assessment and Plan: Empyema doing well, labs and CXR as expected, Sleeping no air leaks noted on exam of Dami Kerr MD

## 2021-12-25 ENCOUNTER — APPOINTMENT (OUTPATIENT)
Dept: GENERAL RADIOLOGY | Age: 51
DRG: 853 | End: 2021-12-25
Attending: THORACIC SURGERY (CARDIOTHORACIC VASCULAR SURGERY)
Payer: COMMERCIAL

## 2021-12-25 ENCOUNTER — ANESTHESIA (OUTPATIENT)
Dept: MEDSURG UNIT | Age: 51
DRG: 853 | End: 2021-12-25
Payer: COMMERCIAL

## 2021-12-25 ENCOUNTER — ANESTHESIA EVENT (OUTPATIENT)
Dept: MEDSURG UNIT | Age: 51
DRG: 853 | End: 2021-12-25
Payer: COMMERCIAL

## 2021-12-25 LAB
BACTERIA SPEC CULT: NORMAL
GRAM STN SPEC: NORMAL
GRAM STN SPEC: NORMAL
SPECIAL REQUESTS,SREQ: NORMAL

## 2021-12-25 PROCEDURE — 74011250636 HC RX REV CODE- 250/636: Performed by: THORACIC SURGERY (CARDIOTHORACIC VASCULAR SURGERY)

## 2021-12-25 PROCEDURE — 74011000258 HC RX REV CODE- 258: Performed by: INTERNAL MEDICINE

## 2021-12-25 PROCEDURE — 74011250637 HC RX REV CODE- 250/637: Performed by: THORACIC SURGERY (CARDIOTHORACIC VASCULAR SURGERY)

## 2021-12-25 PROCEDURE — 74011250636 HC RX REV CODE- 250/636: Performed by: INTERNAL MEDICINE

## 2021-12-25 PROCEDURE — 74011250637 HC RX REV CODE- 250/637: Performed by: INTERNAL MEDICINE

## 2021-12-25 PROCEDURE — 77010033678 HC OXYGEN DAILY

## 2021-12-25 PROCEDURE — 74011250637 HC RX REV CODE- 250/637: Performed by: NURSE PRACTITIONER

## 2021-12-25 PROCEDURE — 94760 N-INVAS EAR/PLS OXIMETRY 1: CPT

## 2021-12-25 PROCEDURE — 71045 X-RAY EXAM CHEST 1 VIEW: CPT

## 2021-12-25 PROCEDURE — 65270000029 HC RM PRIVATE

## 2021-12-25 PROCEDURE — 99024 POSTOP FOLLOW-UP VISIT: CPT | Performed by: THORACIC SURGERY (CARDIOTHORACIC VASCULAR SURGERY)

## 2021-12-25 RX ADMIN — PIPERACILLIN SODIUM AND TAZOBACTAM SODIUM 3.38 G: 3; .375 INJECTION, POWDER, LYOPHILIZED, FOR SOLUTION INTRAVENOUS at 02:30

## 2021-12-25 RX ADMIN — KETOROLAC TROMETHAMINE 15 MG: 15 INJECTION, SOLUTION INTRAMUSCULAR; INTRAVENOUS at 17:02

## 2021-12-25 RX ADMIN — KETOROLAC TROMETHAMINE 15 MG: 15 INJECTION, SOLUTION INTRAMUSCULAR; INTRAVENOUS at 00:16

## 2021-12-25 RX ADMIN — FAMOTIDINE 20 MG: 20 TABLET ORAL at 10:12

## 2021-12-25 RX ADMIN — KETOROLAC TROMETHAMINE 15 MG: 15 INJECTION, SOLUTION INTRAMUSCULAR; INTRAVENOUS at 12:06

## 2021-12-25 RX ADMIN — KETOROLAC TROMETHAMINE 15 MG: 15 INJECTION, SOLUTION INTRAMUSCULAR; INTRAVENOUS at 06:44

## 2021-12-25 RX ADMIN — MULTIVITAMIN TABLET 1 TABLET: TABLET at 10:11

## 2021-12-25 RX ADMIN — KETOROLAC TROMETHAMINE 15 MG: 15 INJECTION, SOLUTION INTRAMUSCULAR; INTRAVENOUS at 23:29

## 2021-12-25 RX ADMIN — THIAMINE HCL TAB 100 MG 100 MG: 100 TAB at 10:11

## 2021-12-25 RX ADMIN — OXYCODONE AND ACETAMINOPHEN 2 TABLET: 5; 325 TABLET ORAL at 20:55

## 2021-12-25 RX ADMIN — OXYCODONE AND ACETAMINOPHEN 2 TABLET: 5; 325 TABLET ORAL at 10:13

## 2021-12-25 RX ADMIN — PIPERACILLIN SODIUM AND TAZOBACTAM SODIUM 3.38 G: 3; .375 INJECTION, POWDER, LYOPHILIZED, FOR SOLUTION INTRAVENOUS at 10:11

## 2021-12-25 RX ADMIN — FAMOTIDINE 20 MG: 20 TABLET ORAL at 20:55

## 2021-12-25 RX ADMIN — FERROUS SULFATE TAB 325 MG (65 MG ELEMENTAL FE) 325 MG: 325 (65 FE) TAB at 10:12

## 2021-12-25 RX ADMIN — AMLODIPINE BESYLATE 5 MG: 5 TABLET ORAL at 10:12

## 2021-12-25 RX ADMIN — PIPERACILLIN SODIUM AND TAZOBACTAM SODIUM 3.38 G: 3; .375 INJECTION, POWDER, LYOPHILIZED, FOR SOLUTION INTRAVENOUS at 17:03

## 2021-12-25 NOTE — PROGRESS NOTES
Post-OP Visit    Imtiaz Funez is a 46 y.o. male who presents with empyema. No complaints today       /75 (BP 1 Location: Left upper arm, BP Patient Position: At rest)   Pulse 84   Temp 98.1 °F (36.7 °C)   Resp 20   Ht 5' 11\" (1.803 m)   Wt 164 lb 3.9 oz (74.5 kg)   SpO2 99%   BMI 22.91 kg/m²     Physical Exam wounds Ok, lungs clear,     Problem List Items Addressed This Visit        Respiratory    Pleural effusion      Other Visit Diagnoses     Community acquired pneumonia of right lung, unspecified part of lung    -  Primary          Assessment and Plan: POD # 4 empyema, doing well epidural off, small airleak continues. I anticipate discharge home on Monday on po antibiotics, no home health will be required.        Volodymyr Bean MD

## 2021-12-25 NOTE — PROGRESS NOTES
Hospitalist Progress Note    Subjective:   Daily Progress Note: 12/25/2021 11:13 AM    Hospital Course:  Oumou Phan is a 49-year-old male admitted on 12/8/2021 with a history of a hypertension and asthma who was transferred from [facility] due to worsening shortness of breath and productive cough. Patient with leukocytosis. CT scan of the chest showing a large multiloculated right pleural effusion consistent with an empyema. There was also a left apical nodule and tree-in-bud appearance with multiple nodules in the left lower lobe. Patient with acute respiratory failure requiring supplemental oxygenation. Patient was found to be a thoracentesis to have Streptococcus angiosis. Infectious disease consult, Dr. Kirk Betancur currently treating with Zosyn. Decortication planned for 12/21/2021 by Dr. Yahir Hall. Patient with an indwelling chest tube and purulent drainage noted from the right chest.  AFB smear negative, Quantiferon negative as well as acid-fast bacilli culture. Blood cultures have been negative. Pulmonary consultation, Dr. Michelet Smith. Patient underwent VATS with right thoracotomy and empyemectomy on 12/21/2021 by Dr. Claude Neve. Post procedure pneumothorax. Subjective:    Patient seen and examined at bedside. POD #4 empyema  He denies any chest pain or shortness of breath this morning. Chest tube in place.      Current Facility-Administered Medications   Medication Dose Route Frequency    oxyCODONE-acetaminophen (PERCOCET) 5-325 mg per tablet 2 Tablet  2 Tablet Oral Q4H PRN    multivitamin with folic acid (ONE DAILY WITH FOLIC ACID) tablet 1 Tablet  1 Tablet Oral DAILY    ferrous sulfate tablet 325 mg  1 Tablet Oral DAILY WITH BREAKFAST    ketorolac (TORADOL) injection 15 mg  15 mg IntraVENous Q6H    famotidine (PEPCID) tablet 20 mg  20 mg Oral BID    diphenhydrAMINE (BENADRYL) injection 12.5 mg  12.5 mg IntraVENous Q4H PRN    nicotine (NICODERM CQ) 21 mg/24 hr patch 1 Patch  1 Patch TransDERmal DAILY    albuterol (PROVENTIL HFA, VENTOLIN HFA, PROAIR HFA) inhaler 2 Puff  2 Puff Inhalation Q6H PRN    budesonide-formoteroL (SYMBICORT) 160-4.5 mcg/actuation HFA inhaler 2 Puff  2 Puff Inhalation BID PRN    amLODIPine (NORVASC) tablet 5 mg  5 mg Oral DAILY    ondansetron (ZOFRAN ODT) tablet 4 mg  4 mg Oral Q8H PRN    piperacillin-tazobactam (ZOSYN) 3.375 g in 0.9% sodium chloride (MBP/ADV) 100 mL MBP  3.375 g IntraVENous Q8H    thiamine mononitrate (B-1) tablet 100 mg  100 mg Oral DAILY        Review of Systems  Constitutional: No fevers, No chills, No sweats, No fatigue, No Weakness  Eyes: No redness  Ears, nose, mouth, throat, and face: No nasal congestion, No sore throat, No voice change  Respiratory: No Shortness of Breath, No cough, No wheezing  Cardiovascular: Right pleuritic chest pain. No palpitations, No extremity edema  Gastrointestinal: No nausea, No vomiting, No diarrhea, No abdominal pain  Genitourinary: No frequency, No dysuria, No hematuria  Integument/breast: No skin lesion(s)   Neurological: No Confusion, No headaches, No dizziness      Objective:     Visit Vitals  /75 (BP 1 Location: Left upper arm, BP Patient Position: At rest)   Pulse 84   Temp 98.1 °F (36.7 °C)   Resp 20   Ht 5' 11\" (1.803 m)   Wt 74.5 kg (164 lb 3.9 oz)   SpO2 99%   BMI 22.91 kg/m²    O2 Flow Rate (L/min): 1 l/min O2 Device: Nasal cannula    Temp (24hrs), Av.7 °F (37.1 °C), Min:98.1 °F (36.7 °C), Max:99.2 °F (37.3 °C)       07 - 1900  In: -   Out: 50    190 -  0700  In: 2262.1 [P.O.:950; I.V.:1312.1]  Out: 5800 [Urine:5550]    PHYSICAL EXAM:  Constitutional: No acute distress  Skin: Extremities and face reveal no rashes. HEENT: Sclerae anicteric. Extra-occular muscles are intact. No oral ulcers. The neck is supple and no masses. Cardiovascular: Regular rate and rhythm. +S1/S2. No murmur or gallop. Respiratory:  Right chest tube in place.   Decreased air entry over entire right lung field. On supplemental oxygen. GI: Abdomen nondistended, soft, and nontender. Normal active bowel sounds. Rectal: Deferred   Musculoskeletal: No pitting edema of the lower legs. Able to move all ext  Neurological:  Patient is alert and oriented. Cranial nerves II-XII grossly intact  Psychiatric: Mood appears appropriate       Data Review    Recent Results (from the past 24 hour(s))   CBC WITH AUTOMATED DIFF    Collection Time: 12/24/21 10:04 AM   Result Value Ref Range    WBC 14.8 (H) 4.1 - 11.1 K/uL    RBC 3.52 (L) 4.10 - 5.70 M/uL    HGB 9.9 (L) 12.1 - 17.0 g/dL    HCT 29.3 (L) 36.6 - 50.3 %    MCV 83.2 80.0 - 99.0 FL    MCH 28.1 26.0 - 34.0 PG    MCHC 33.8 30.0 - 36.5 g/dL    RDW 13.0 11.5 - 14.5 %    PLATELET 914 (H) 277 - 400 K/uL    MPV 9.6 8.9 - 12.9 FL    NRBC 0.0 0.0  WBC    ABSOLUTE NRBC 0.00 0.00 - 0.01 K/uL    NEUTROPHILS 77 (H) 32 - 75 %    LYMPHOCYTES 12 12 - 49 %    MONOCYTES 8 5 - 13 %    EOSINOPHILS 1 0 - 7 %    BASOPHILS 1 0 - 1 %    IMMATURE GRANULOCYTES 1 (H) 0 - 0.5 %    ABS. NEUTROPHILS 11.5 (H) 1.8 - 8.0 K/UL    ABS. LYMPHOCYTES 1.7 0.8 - 3.5 K/UL    ABS. MONOCYTES 1.2 (H) 0.0 - 1.0 K/UL    ABS. EOSINOPHILS 0.2 0.0 - 0.4 K/UL    ABS. BASOPHILS 0.1 0.0 - 0.1 K/UL    ABS. IMM. GRANS. 0.1 (H) 0.00 - 0.04 K/UL    DF AUTOMATED         XR CHEST PORT   Final Result   1. Smaller right basilar pneumothorax. 2. Moderate right basilar airspace disease, similar to the prior exam.   3. Mild left basilar airspace disease, similar to the prior exam.      XR CHEST PORT   Final Result      XR CHEST PORT   Final Result      XR CHEST PORT   Final Result   1. Diminished right pneumothorax. 2. Probable free subdiaphragmatic gas in this patient with a right base chest   tube. 3. I reported the findings to the patient's nurse by telephone at 8:50 PM on   12/21/2021. The nurse indicated that she would report the findings to the   attending physician.       XR CHEST PORT   Final Result      XR CHEST PORT   Final Result   No significant interval change. CT CHEST WO CONT   Final Result   Complex findings in the right hemithorax as above, question related to   infection. Small left pleural effusion and mild left basilar atelectasis. Right hilar and mild mediastinal adenopathy is nonspecific, may be reactive. Inferior         XR CHEST PORT   Final Result   1. There is residual airspace disease versus atelectasis within the right mid   to lower lung zones without short-term interval changes. 2.  There has been partial evacuation of a  right-sided empyema with moderate   residual areas of loculated pleural fluid and gas without short-term interval   changes. 3.  On recent CT imaging from 12/8/2021, there was demonstrated a massive right   pleural effusion as well as tree-in-bud nodular densities within the left lung. These findings can be associated with tuberculosis and nontuberculous   mycobacterial infections. Please correlate clinically. XR CHEST PORT   Final Result      XR CHEST PORT   Final Result      XR CHEST PORT   Final Result      XR CHEST PORT   Final Result      IR THORACENTESIS/INSERT CHEST TUBE   Final Result   Ultrasound of the right upper back was performed with images stored in PACS,   demonstrating heterogeneous collection most consistent with emphysema. Successful US-guided 8.5 percutaneous drainage catheter placement into right   empyema. Plan:   Continue flushing the catheter using 10 ml sterile normal saline and record the   output at least once a day. Consider catheter removal if there is less than 20 mL output over 24 hours. XR CHEST PORT    (Results Pending)   XR CHEST PORT    (Results Pending)   XR CHEST PORT    (Results Pending)       Active Problems:    Pleural effusion (12/8/2021)        Assessment/Plan:     1.   Right multiloculated pleural effusion consistent with empyema  Cardiothoracic surgery and decortication 12/21/2021  Continue Zosyn  ID consultation  Pulmonary consultation  Postprocedural pneumothorax and acute respiratory failure with hypoxia. 2 chest tubes placed during the decortication  Pain management  Subdiaphragmatic air as surgery required intraperitoneal approach for decortication       2. Sepsis  Secondary #1     3. History of alcohol and tobacco abuse  Continue Ativan as needed  Nicotine patch     4.  Essential hypertension  Continue Norvasc        DVT prophylaxis: Lovenox on hold  Code Status: Full code  POA:    Discharge Barriers: Will likely be ready for discharge with chest tube on Monday  Care Plan discussed with: patient and nursing    Total time spent with patient: >35 minutes.

## 2021-12-25 NOTE — PROGRESS NOTES
Problem: Falls - Risk of  Goal: *Absence of Falls  Description: Document Jake Barrios Fall Risk and appropriate interventions in the flowsheet.   Outcome: Progressing Towards Goal  Note: Fall Risk Interventions:  Mobility Interventions: Patient to call before getting OOB         Medication Interventions: Patient to call before getting OOB    Elimination Interventions: Call light in reach

## 2021-12-25 NOTE — PROGRESS NOTES
Pulmonary, Critical Care    Name: Yenifer Gaitan MRN: [id number]   : 1970 Hospital: [address] [place] [address]   Date: 2021  Admission date: 2021 Hospital Day: 18       Subjective/Interval History:   Seen on the medical floor awake alert. History is 2 or so weeks of breathing difficulty. Started with severe chest pain pleuritic with chills and sweats he did not take his temperature but felt hot at times. He has had continued worsening presented to the emergency room has a large right pleural effusion. CT scan suggests multiloculated effusion. Leukocytosis up to 34,000 all consistent with empyema. He is a drinker of 1-2 sixpacks a day. Does drink to the point of falling asleep although he states he is never passed out. 12/10 percutaneous drainage catheter placed by IR yesterday with 1600 cc drainage overnight and so far today 700  / chest catheter continues to drain he states he feels a little better. Chest x-ray today has small apical pneumothorax with significant amount of remaining pleural fluid   awake alert states he feels better   no specific complaints respirations   for decortication on  status post decortication and VATS     Hospital Problems  Date Reviewed: 2021          Codes Class Noted POA    Pleural effusion ICD-10-CM: J90  ICD-9-CM: 511.9  2021 Unknown              IMPRESSION:   1. Status post right-sided VATS right thoracotomy with decortication on   2. S/P empyema  3. Chest tube in place  4. Alcohol abuse  5. COPD exacerbation  Body mass index is 22.91 kg/m². RECOMMENDATIONS/PLAN:   1. Pleural fluid loculated positive for Streptococcus anguosus on Zosyn  2. Status post VATS right thoracotomy and decortication, he is comfortable alert awake not in any distress  3. Chest tube in place small air leak  4. No wheezing  bronchodilators now inhalers  5.  Continue IV Zosyn WBC count continues to improve           [x] High complexity decision making was performed  [x] See my orders for details      Subjective/Initial History:     I was asked by John Welch MD to see Gabrielle Marie  a 46 y.o.  male in consultation for a chief complaint of multiloculated right pleural effusion likely empyema      No Known Allergies     MAR reviewed and pertinent medications noted or modified as needed     Current Facility-Administered Medications   Medication    oxyCODONE-acetaminophen (PERCOCET) 5-325 mg per tablet 2 Tablet    multivitamin with folic acid (ONE DAILY WITH FOLIC ACID) tablet 1 Tablet    ferrous sulfate tablet 325 mg    ketorolac (TORADOL) injection 15 mg    famotidine (PEPCID) tablet 20 mg    diphenhydrAMINE (BENADRYL) injection 12.5 mg    nicotine (NICODERM CQ) 21 mg/24 hr patch 1 Patch    albuterol (PROVENTIL HFA, VENTOLIN HFA, PROAIR HFA) inhaler 2 Puff    budesonide-formoteroL (SYMBICORT) 160-4.5 mcg/actuation HFA inhaler 2 Puff    amLODIPine (NORVASC) tablet 5 mg    ondansetron (ZOFRAN ODT) tablet 4 mg    piperacillin-tazobactam (ZOSYN) 3.375 g in 0.9% sodium chloride (MBP/ADV) 100 mL MBP    thiamine mononitrate (B-1) tablet 100 mg      Patient PCP: None  PMH:  has a past medical history of Asthma and Ill-defined condition. PSH:   has a past surgical history that includes ir thoracentesis/insert chest tube (12/9/2021). FHX: family history is not on file. SHX:  reports that he has quit smoking. He has never used smokeless tobacco. He reports current alcohol use. He reports previous drug use. To me he admits smoking 1 to 2 packs/day ever since he was 8 or 10.   He drinks 1-2 sixpacks a day beer     Systemic review:  General states his weight stable has been having chills and fever for the last 2 weeks or more  Eyes no double vision or momentary blindness  ENT no drainage or facial pain  Musculoskeletal no swollen tender joints  Endocrinologic no polyuria polydipsia  Neurologic no seizures or syncope  Gastrointestinal no nausea vomiting acid indigestion. Genitourinary no pain or discomfort on urination  Cardiovascular no history of heart disease or ankle edema he has had the pleuritic chest pain no diaphoresis  Respiratory as mentioned cough some yellow sputum fever chills shortness of breath and pleuritic pain on the right    Objective:     Vital Signs: Telemetry:    normal sinus rhythm Intake/Output:   Visit Vitals  /75 (BP 1 Location: Left upper arm, BP Patient Position: At rest)   Pulse 84   Temp 98.1 °F (36.7 °C)   Resp 20   Ht 5' 11\" (1.803 m)   Wt 74.5 kg (164 lb 3.9 oz)   SpO2 99%   BMI 22.91 kg/m²       Temp (24hrs), Av.7 °F (37.1 °C), Min:98.1 °F (36.7 °C), Max:99.2 °F (37.3 °C)        O2 Device: Nasal cannula O2 Flow Rate (L/min): 1 l/min       Wt Readings from Last 4 Encounters:   12/15/21 74.5 kg (164 lb 3.9 oz)   21 73.5 kg (162 lb)          Intake/Output Summary (Last 24 hours) at 2021 1017  Last data filed at 2021 0709  Gross per 24 hour   Intake 1624.9 ml   Output 5000 ml   Net -3375.1 ml       Last shift:       0701 -  1900  In: -   Out: 50   Last 3 shifts:  190 -  0700  In: 2262.1 [P.O.:950; I.V.:1312.1]  Out: 5800 [Urine:5550]       Physical Exam:   General:  male; lying in bed no distress no accessory muscle usage  HEENT: NCAT, poor dentition,  Eyes: anicteric; conjunctiva clear extraocular movements intact  Neck: no nodes, no JVD, no accessory MM use. Chest: no deformity,   Cardiac: Regular rate and rhythm  Lungs: Markedly improved breath sounds on the right present anteriorly and upper lung posteriorly and developing some in the right base now, left side fairly clear  Abd: Thin soft positive bowel sounds  Ext: no edema; no joint swelling;  No clubbing  : clear urine  Neuro: Awake alert calm speech is clear moves all 4 extremities  Psych- no agitation, oriented to person;   Skin: warm, dry, no cyanosis;   Pulses: Brachial radial pulses intact  Capillary: Normal capillary refill    Labs:    Recent Labs     12/24/21  1004 12/23/21  0847   WBC 14.8* 15.7*   HGB 9.9* 9.8*   * 514*     No results for input(s): NA, K, CL, CO2, GLU, BUN, CREA, CA, MG, PHOS, LAC, ALB, TBIL, ALT, AML, LPSE in the last 72 hours. No lab exists for component: SGOT. Pleural fluid WBC count 47,840 with 56% polys 32% lymphs pleural fluid protein 0.8 glucose 4  Pleural culture with  microaerophilic Streptococcus    Lab Results   Component Value Date/Time    Culture result: NO GROWTH ON SOLID MEDIA AT 4 DAYS 12/21/2021 09:45 AM    Culture result:  12/21/2021 09:45 AM     Streptococcus constellatus isolated from Thio broth only    Culture result:  12/21/2021 09:45 AM     (NOTE) STREP SPECIES GROWING IN BROTH ONLY CALLED TO ISABEL PIERCE [employer] ON 12/24/21 AT 1240.      Imaging:    CXR Results  (Last 48 hours)               12/12/21 0854  XR CHEST PORT Final result    Narrative:  Chest single view. Comparison single view chest December 11, 2021. Similar appearance for the right hemithorax. Predominant right basilar airspace opacity, similar volume. Unchanged hydropneumothorax. Similar degree apical pleural separation. Left lung aerated. Cardiac and mediastinal structures unchanged. To me the x-ray is improved you can start to see the right hemidiaphragm now implying further decrease in effusion in the apical pneumothorax is minimally improved       12/11/21 0752  XR CHEST PORT Final result    Narrative:  Chest single view. Comparison single view chest December 8, 2021. Right-sided hydropneumothorax. Estimated moderate volume pleural fluid. 1.1 cm   apical pleural separation. Left lung aerated. Cardiac and mediastinal structures   unchanged.            Results from Hospital Encounter encounter on 12/08/21    XR CHEST PORT    Narrative  Exam: Single view frontal chest    Comparison: Prior exams dating to 12/17/2021, the most recent earlier today    Findings: Right chest tubes appear stable. There is probable free  subdiaphragmatic gas. A right base chest tube overlies the free air. Diminished  right pneumothorax since earlier today. The apex of the lung is now 2.3 cm from  the superior chest wall. Mediastinum remains midline. Persistent though  diminished right lung atelectasis. No developing pulmonary parenchymal or  pleural lesion. Impression  1. Diminished right pneumothorax. 2. Probable free subdiaphragmatic gas in this patient with a right base chest  tube. 3. I reported the findings to the patient's nurse by telephone at 8:50 PM on  12/21/2021. The nurse indicated that she would report the findings to the  attending physician. XR CHEST PORT    Narrative  Chest single view. Comparison single view chest December 20, 2021. Post surgery. Newly placed right-sided chest tubes x2. Small volume right chest wall subcutaneous air. Right side pneumothorax, 4.2 cm apical pleural separation. Atelectatic right  lung. Left lung aerated. Cardiac and mediastinal structures unchanged. XR CHEST PORT    Narrative  AP portable chest, 0857 hours. Comparison: 12/23/2021. Findings: Cardiac monitoring leads overlie the chest. The 2 right-sided chest  tubes are stable. There is mild subcutaneous emphysema along the right chest  wall. There is a small right basilar pneumothorax with 3 mm of pleural  separation, previously 8 mm. The heart is normal in size. There is moderate  airspace disease at the right lung base. There is mild airspace disease at the  left lung base. No pleural effusion is identified. There are senescent changes  within the spine. Impression  1. Smaller right basilar pneumothorax.   2. Moderate right basilar airspace disease, similar to the prior exam.  3. Mild left basilar airspace disease, similar to the prior exam.    Results from [place] encounter on 12/08/21    CT CHEST WO CONT    Narrative  Noncontrast exam.  Comparison chest radiograph yesterday. Dose Reduction:  All CT scans at this facility are performed using dose reduction optimization  techniques as appropriate to a performed exam including the following: Automated  exposure control, adjustments of the mA and/or kV according to patient size, or  use of iterative reconstruction technique. 6 x 6 x 3.5 cm loculated collection with an air-fluid level in the right apex. Complex densities in the right mid and lower lower lung including thick-walled  lesion containing central well-defined gas lucencies, potentially loculated 6 cm  fluid collection in the lateral right midlung, and small posterior inferior  fluid containing a pigtail drain and multiple gas bubbles in a pattern  suggesting that the fluid is highly viscous. Multiple air bronchograms are noted  within these complex densities. Small left pleural effusion and left basilar atelectasis. No anterior pneumothorax. Right hilar and mediastinal adenopathy, likely reactive. Atherosclerosis, including coronary artery calcification. Impression  Complex findings in the right hemithorax as above, question related to  infection. Small left pleural effusion and mild left basilar atelectasis. Right hilar and mild mediastinal adenopathy is nonspecific, may be reactive. Inferior      · 12/9 discussion 2-week history of pleuritic pain cough chills sweats fever and a drinker who will drink to the point of falling asleep most likely has aspiration pneumonia with empyema. IR has been consulted for joellen peters.   He very likely will have multiple loculations preventing drainage and may need VATS  · 12/11 feels fine respirations nonlabored has just an occasional air bubble when he coughs chest x-ray with small apical pneumothorax significant fluid remains although it is certainly improved culture consistent with oral portillo to remain on Zosyn WBC count improved we will discontinue vancomycin  · 12/12 doing well at this point no air leak seen on chest tube and chest x-ray is mildly improved. Continue current treatment  · 12/13 drainage has decreased from the chest catheter. Will flush with saline and follow drainage.   Chest x-ray is improved diaphragm can be seen on the right with infiltrate in that area likely representing pneumonia cannot tell how much fluid remains  · 12/15 chest x-ray   · 12/17 on antibiotic no chest pain repeat chest x-ray shows infiltrate atelectasis right midlung and improvement in there right-sided pleural loculated effusion  · 12/25 status post VATS decortication chest tube in place small air leak    Rudolph Eduardo MD

## 2021-12-25 NOTE — PROGRESS NOTES
Infectious Diseases Progress Note  Cory Patino MD  [phone]  Date:2021       Room:[facility]  Patient Name:Jason Blanchard     YOB: 1970     Age:51 y.o. 51M with history of hypertension, bronchitis, asthma. Long history of smoking. He reports prior vaccinations against coronavirus.     21 admitted to [facility] from [facility] after he was found to have a pleural effusion. He presents with two weeks of productive cough, shortness of breath, fevers, chills, generalized weakness. During the ED course underwent CT imaging which revealed large multiloculated right pleural effusion.     21 CT Chest  IMPRESSION  Large multiloculated right pleural effusion with subsequent near complete  atelectasis of the right lung. Therapeutic and diagnostic thoracentesis  recommended. Tree-in-bud centrilobular nodules present throughout the left lower lobe with  larger nodular airspace opacity apical posterior segment left upper lobe. Findings consistent with bronchiolitis/bronchopneumonia. The larger airspace  nodule in the apical posterior segment left upper lobe require surveillance.     21 underwent IR thoracentesis  IMPRESSION  Ultrasound of the right upper back was performed with images stored in PACS,  demonstrating heterogeneous collection most consistent with emphysema. Successful US-guided 8.5 percutaneous drainage catheter placement into right  empyema.      Pleural cultures reveal Streptococcus anginosus and I have been asked to see him in consultation. 21 taken to OR by Dr Ann Marie Donaldson for surgical management of Empyema  Procedure(s):  RIGHT VIDEO ASSISTED THORACIC SURGERY, Right THORACOTOMY, DECORTICATION     Subjective    Subjective:  Symptoms:  Stable. Review of Systems   All other systems reviewed and are negative.     Objective         Vitals Last 24 Hours:  TEMPERATURE:  Temp  Av.7 °F (37.1 °C)  Min: 98.1 °F (36.7 °C)  Max: 99.2 °F (37.3 °C)  RESPIRATIONS RANGE: Resp  Avg: 20  Min: 20  Max: 20  PULSE OXIMETRY RANGE: SpO2  Av %  Min: 97 %  Max: 99 %  PULSE RANGE: Pulse  Av  Min: 77  Max: 88  BLOOD PRESSURE RANGE: Systolic (25THS), JPB:876 , Min:133 , QRT:836   ; Diastolic (84LBE), IRD:56, Min:73, Max:78    I/O (24Hr): Intake/Output Summary (Last 24 hours) at 2021 1258  Last data filed at 2021 0709  Gross per 24 hour   Intake 1624.9 ml   Output 3100 ml   Net -1475.1 ml     Objective:  General Appearance:  Comfortable. Vital signs: (most recent): Blood pressure 135/75, pulse 84, temperature 98.1 °F (36.7 °C), resp. rate 20, height 5' 11\" (1.803 m), weight 74.5 kg (164 lb 3.9 oz), SpO2 98 %. Vital signs are normal.    HEENT: Normal HEENT exam.    Lungs:  Normal effort and normal respiratory rate. Heart: Normal rate. Regular rhythm. Abdomen: Abdomen is soft. Bowel sounds are normal.     Extremities: Normal range of motion. Pulses: Distal pulses are intact. Neurological: Patient is alert. Pupils:  Pupils are equal, round, and reactive to light. Skin:  Warm and dry. Labs/Imaging/Diagnostics    Labs:  CBC:  Recent Labs     21  1004 21  0847   WBC 14.8* 15.7*   RBC 3.52* 3.53*   HGB 9.9* 9.8*   HCT 29.3* 29.8*   MCV 83.2 84.4   RDW 13.0 13.2   * 514*     CHEMISTRIES:  No results for input(s): NA, K, CL, CO2, BUN, CA, PHOS, MG in the last 72 hours. No lab exists for component: CREATININE, GLUCOSEPT/INR:No results for input(s): INR, INREXT, INREXT in the last 72 hours. No lab exists for component: PROTIME  APTT:No results for input(s): APTT in the last 72 hours. LIVER PROFILE:  No results for input(s): AST, ALT in the last 72 hours. No lab exists for component: Loraine Lysite, ALKPHOS  Lab Results   Component Value Date/Time    ALT (SGPT) 39 2021 08:24 AM    AST (SGOT) 45 (H) 2021 08:24 AM    Alk.  phosphatase 207 (H) 2021 08:24 AM    Bilirubin, total 0.4 2021 08:24 AM       Imaging Last 24 Hours:  XR CHEST PORT    Result Date: 12/25/2021  AP upright chest x-ray 0817 hours. COMPARISON: Chest x-ray 12/24/2021. Findings/impression: There are 2 right-sided chest tubes present, one dilated towards the apex, and the other chest tube projecting over the right lower chest, similar to prior study. There is small lucency in the right apex and right lung base indicating small right pneumothorax, grossly similar to prior study. No tension physiology. Right infrahilar heterogeneous airspace disease present. Mild left infrahilar airspace disease present. Obscured right cardiac silhouette. Normal left cardiomediastinal silhouette. No suspicious osseous lesions. There is mild soft tissue emphysema involving right lower lateral chest wall near the chest tubes, similar to prior study. No significant interval change. Assessment//Plan   Active Problems:    Pleural effusion (12/8/2021)      Assessment & Plan   51M with history of hypertension, bronchitis, asthma. Long history of smoking. He reports prior vaccinations against coronavirus.     12/8/21 admitted to [facility] from [facility] after he was found to have a pleural effusion. He presents with two weeks of productive cough, shortness of breath, fevers, chills, generalized weakness. During the ED course underwent CT imaging which revealed large multiloculated right pleural effusion.     12/8/21 CT Chest  IMPRESSION  Large multiloculated right pleural effusion with subsequent near complete  atelectasis of the right lung. Therapeutic and diagnostic thoracentesis  recommended. Tree-in-bud centrilobular nodules present throughout the left lower lobe with  larger nodular airspace opacity apical posterior segment left upper lobe. Findings consistent with bronchiolitis/bronchopneumonia.  The larger airspace  nodule in the apical posterior segment left upper lobe require surveillance.     12/9/21 underwent IR thoracentesis  IMPRESSION  Ultrasound of the right upper back was performed with images stored in PACS,  demonstrating heterogeneous collection most consistent with emphysema. Successful US-guided 8.5 percutaneous drainage catheter placement into right  empyema.      Pleural cultures reveal Streptococcus anginosus and I have been asked to see him in consultation. 12/21/21 taken to OR by Dr Natalee Smith for surgical management of Empyema  Procedure(s):  RIGHT VIDEO ASSISTED THORACIC SURGERY, Right THORACOTOMY, DECORTICATION     MICROBIOLOGY     12/8/21            Covid19           Negative  12/8/21            Blood               Negative  12/9/21            Blood               Negative     12/9/21            IR pleural         Heavy Streptococcus anginosus     12/16/21          Quant TB         Negative    12/16/21 Pleural  AFB smear negative, AFB culture pending      Fungal negative so far    12/21/21 Pleural OR Streptococcus constellatus  12/21/21 OR fungal Pending  12/21/21 Urine  Negative     ASSESSMENT AND RECOMMENDATIONS     1) Empyema presenting as complex loculated pleural effusion in the setting of advanced aspiration pneumonia.  Status post VATS decortication for empyema as above.                 Zosyn iv while in hospital     Once stable for discharge, oral Augmentin to complete outpatient course                          Augmentin 875mg po bid through 1/7/22       Electronically signed by Jeancarlos Steele MD on 12/25/2021 at 12:48 PM

## 2021-12-26 ENCOUNTER — APPOINTMENT (OUTPATIENT)
Dept: GENERAL RADIOLOGY | Age: 51
DRG: 853 | End: 2021-12-26
Attending: THORACIC SURGERY (CARDIOTHORACIC VASCULAR SURGERY)
Payer: COMMERCIAL

## 2021-12-26 PROCEDURE — 74011250636 HC RX REV CODE- 250/636: Performed by: THORACIC SURGERY (CARDIOTHORACIC VASCULAR SURGERY)

## 2021-12-26 PROCEDURE — 74011250636 HC RX REV CODE- 250/636: Performed by: INTERNAL MEDICINE

## 2021-12-26 PROCEDURE — 74011250637 HC RX REV CODE- 250/637: Performed by: THORACIC SURGERY (CARDIOTHORACIC VASCULAR SURGERY)

## 2021-12-26 PROCEDURE — 77010033678 HC OXYGEN DAILY

## 2021-12-26 PROCEDURE — 71045 X-RAY EXAM CHEST 1 VIEW: CPT

## 2021-12-26 PROCEDURE — 65270000029 HC RM PRIVATE

## 2021-12-26 PROCEDURE — 74011250637 HC RX REV CODE- 250/637: Performed by: INTERNAL MEDICINE

## 2021-12-26 PROCEDURE — 74011000258 HC RX REV CODE- 258: Performed by: THORACIC SURGERY (CARDIOTHORACIC VASCULAR SURGERY)

## 2021-12-26 PROCEDURE — 74011000258 HC RX REV CODE- 258: Performed by: INTERNAL MEDICINE

## 2021-12-26 PROCEDURE — 74011250637 HC RX REV CODE- 250/637: Performed by: NURSE PRACTITIONER

## 2021-12-26 RX ORDER — AMOXICILLIN AND CLAVULANATE POTASSIUM 500; 125 MG/1; MG/1
1 TABLET, FILM COATED ORAL EVERY 12 HOURS
Status: DISCONTINUED | OUTPATIENT
Start: 2021-12-27 | End: 2021-12-27 | Stop reason: HOSPADM

## 2021-12-26 RX ADMIN — AMLODIPINE BESYLATE 5 MG: 5 TABLET ORAL at 10:49

## 2021-12-26 RX ADMIN — OXYCODONE AND ACETAMINOPHEN 2 TABLET: 5; 325 TABLET ORAL at 19:30

## 2021-12-26 RX ADMIN — FAMOTIDINE 20 MG: 20 TABLET ORAL at 10:59

## 2021-12-26 RX ADMIN — PIPERACILLIN SODIUM AND TAZOBACTAM SODIUM 3.38 G: 3; .375 INJECTION, POWDER, LYOPHILIZED, FOR SOLUTION INTRAVENOUS at 01:52

## 2021-12-26 RX ADMIN — MULTIVITAMIN TABLET 1 TABLET: TABLET at 10:49

## 2021-12-26 RX ADMIN — PIPERACILLIN SODIUM AND TAZOBACTAM SODIUM 3.38 G: 3; .375 INJECTION, POWDER, LYOPHILIZED, FOR SOLUTION INTRAVENOUS at 17:58

## 2021-12-26 RX ADMIN — KETOROLAC TROMETHAMINE 15 MG: 15 INJECTION, SOLUTION INTRAMUSCULAR; INTRAVENOUS at 12:33

## 2021-12-26 RX ADMIN — THIAMINE HCL TAB 100 MG 100 MG: 100 TAB at 10:49

## 2021-12-26 RX ADMIN — FERROUS SULFATE TAB 325 MG (65 MG ELEMENTAL FE) 325 MG: 325 (65 FE) TAB at 10:50

## 2021-12-26 RX ADMIN — PIPERACILLIN SODIUM AND TAZOBACTAM SODIUM 3.38 G: 3; .375 INJECTION, POWDER, LYOPHILIZED, FOR SOLUTION INTRAVENOUS at 10:50

## 2021-12-26 RX ADMIN — KETOROLAC TROMETHAMINE 15 MG: 15 INJECTION, SOLUTION INTRAMUSCULAR; INTRAVENOUS at 06:06

## 2021-12-26 RX ADMIN — OXYCODONE AND ACETAMINOPHEN 2 TABLET: 5; 325 TABLET ORAL at 11:00

## 2021-12-26 RX ADMIN — FAMOTIDINE 20 MG: 20 TABLET ORAL at 22:00

## 2021-12-26 NOTE — PROGRESS NOTES
Infectious Diseases Progress Note  Cory Smyth MD  [phone]  Date:2021       Room:[facility]  Patient Name:Jason Osman     YOB: 1970     Age:51 y.o. 51M with history of hypertension, bronchitis, asthma. Long history of smoking. He reports prior vaccinations against coronavirus.     21 admitted to [facility] from [facility] after he was found to have a pleural effusion. He presents with two weeks of productive cough, shortness of breath, fevers, chills, generalized weakness. During the ED course underwent CT imaging which revealed large multiloculated right pleural effusion.     21 CT Chest  IMPRESSION  Large multiloculated right pleural effusion with subsequent near complete  atelectasis of the right lung. Therapeutic and diagnostic thoracentesis  recommended. Tree-in-bud centrilobular nodules present throughout the left lower lobe with  larger nodular airspace opacity apical posterior segment left upper lobe. Findings consistent with bronchiolitis/bronchopneumonia. The larger airspace  nodule in the apical posterior segment left upper lobe require surveillance.     21 underwent IR thoracentesis  IMPRESSION  Ultrasound of the right upper back was performed with images stored in PACS,  demonstrating heterogeneous collection most consistent with emphysema. Successful US-guided 8.5 percutaneous drainage catheter placement into right  empyema.      Pleural cultures reveal Streptococcus anginosus and I have been asked to see him in consultation. 21 taken to OR by Dr Isabell Cardona for surgical management of Empyema  Procedure(s):  RIGHT VIDEO ASSISTED THORACIC SURGERY, Right THORACOTOMY, DECORTICATION     Subjective    Subjective:  Symptoms:  Stable. Review of Systems   All other systems reviewed and are negative.     Objective         Vitals Last 24 Hours:  TEMPERATURE:  Temp  Av °F (37.2 °C)  Min: 97.7 °F (36.5 °C)  Max: 101 °F (38.3 °C)  RESPIRATIONS RANGE: Resp  Avg: 19.5  Min: 18  Max: 20  PULSE OXIMETRY RANGE: SpO2  Av %  Min: 95 %  Max: 98 %  PULSE RANGE: Pulse  Av.3  Min: 63  Max: 90  BLOOD PRESSURE RANGE: Systolic (39CXE), DHO:154 , Min:127 , CVI:905   ; Diastolic (30YIT), FRS:45, Min:61, Max:82    I/O (24Hr): Intake/Output Summary (Last 24 hours) at 2021 1154  Last data filed at 2021 0616  Gross per 24 hour   Intake 1885 ml   Output 6170 ml   Net -4285 ml     Objective:  General Appearance:  Comfortable. Vital signs: (most recent): Blood pressure (!) 151/81, pulse 79, temperature 98.7 °F (37.1 °C), resp. rate 18, height 5' 11\" (1.803 m), weight 74.5 kg (164 lb 3.9 oz), SpO2 98 %. Vital signs are normal.    HEENT: Normal HEENT exam.    Lungs:  Normal effort and normal respiratory rate. Heart: Normal rate. Regular rhythm. Abdomen: Abdomen is soft. Bowel sounds are normal.     Extremities: Normal range of motion. Pulses: Distal pulses are intact. Neurological: Patient is alert. Pupils:  Pupils are equal, round, and reactive to light. Skin:  Warm and dry. Labs/Imaging/Diagnostics    Labs:  CBC:  Recent Labs     21  1004   WBC 14.8*   RBC 3.52*   HGB 9.9*   HCT 29.3*   MCV 83.2   RDW 13.0   *     CHEMISTRIES:  No results for input(s): NA, K, CL, CO2, BUN, CA, PHOS, MG in the last 72 hours. No lab exists for component: CREATININE, GLUCOSEPT/INR:No results for input(s): INR, INREXT, INREXT in the last 72 hours. No lab exists for component: PROTIME  APTT:No results for input(s): APTT in the last 72 hours. LIVER PROFILE:  No results for input(s): AST, ALT in the last 72 hours. No lab exists for component: Carine Bleacher, ALKPHOS  Lab Results   Component Value Date/Time    ALT (SGPT) 39 2021 08:24 AM    AST (SGOT) 45 (H) 2021 08:24 AM    Alk.  phosphatase 207 (H) 2021 08:24 AM    Bilirubin, total 0.4 2021 08:24 AM       Imaging Last 24 Hours:  XR CHEST PORT    Result Date: 12/26/2021  Exam: Single view frontal chest Comparison: Prior exams dating to  12/21/2021 , the most recent yesterday Findings: Right chest tubes as before. Small residual right pneumothorax as before. Mediastinum midline. Right base atelectasis and/or pneumonia as before. No developing pulmonary parenchymal or pleural lesion. Stable. Assessment//Plan   Active Problems:    Pleural effusion (12/8/2021)      Assessment & Plan   51M with history of hypertension, bronchitis, asthma. Long history of smoking. He reports prior vaccinations against coronavirus.     12/8/21 admitted to [facility] from [facility] after he was found to have a pleural effusion. He presents with two weeks of productive cough, shortness of breath, fevers, chills, generalized weakness. During the ED course underwent CT imaging which revealed large multiloculated right pleural effusion.     12/8/21 CT Chest  IMPRESSION  Large multiloculated right pleural effusion with subsequent near complete  atelectasis of the right lung. Therapeutic and diagnostic thoracentesis  recommended. Tree-in-bud centrilobular nodules present throughout the left lower lobe with  larger nodular airspace opacity apical posterior segment left upper lobe. Findings consistent with bronchiolitis/bronchopneumonia. The larger airspace  nodule in the apical posterior segment left upper lobe require surveillance.     12/9/21 underwent IR thoracentesis  IMPRESSION  Ultrasound of the right upper back was performed with images stored in PACS,  demonstrating heterogeneous collection most consistent with emphysema. Successful US-guided 8.5 percutaneous drainage catheter placement into right  empyema.      Pleural cultures reveal Streptococcus anginosus and I have been asked to see him in consultation.     12/21/21 taken to OR by Dr Carlton Larkin for surgical management of Empyema  Procedure(s):  RIGHT VIDEO ASSISTED THORACIC SURGERY, Right THORACOTOMY, DECORTICATION     MICROBIOLOGY     12/8/21            Covid19           Negative  12/8/21            Blood               Negative  12/9/21            Blood               Negative     12/9/21            IR pleural         Heavy Streptococcus anginosus     12/16/21          Quant TB         Negative    12/16/21 Pleural  AFB smear negative, AFB culture pending      Fungal negative so far    12/21/21 Pleural OR Streptococcus constellatus  12/21/21 OR fungal Pending  12/21/21 Urine  Negative     ASSESSMENT AND RECOMMENDATIONS     1) Empyema presenting as complex loculated pleural effusion in the setting of advanced aspiration pneumonia.  Status post VATS decortication for empyema as above.                 Zosyn iv while in hospital     Once stable for discharge, oral Augmentin to complete outpatient course                          Augmentin 875mg po bid through 1/7/22       Electronically signed by Jitendra Connell MD on 12/26/2021 at 12:48 PM

## 2021-12-26 NOTE — PROGRESS NOTES
Post-OP Visit    Nahomy Cornell is a 46 y.o. male who presents with out complaints       BP (!) 151/81 (BP 1 Location: Left upper arm, BP Patient Position: At rest)   Pulse 79   Temp 98.7 °F (37.1 °C)   Resp 18   Ht 5' 11\" (1.803 m)   Wt 164 lb 3.9 oz (74.5 kg)   SpO2 98%   BMI 22.91 kg/m²     Physical Exam Chest wounds OK     Problem List Items Addressed This Visit        Respiratory    Pleural effusion      Other Visit Diagnoses     Community acquired pneumonia of right lung, unspecified part of lung    -  Primary          Assessment and Plan: sp empyema, wounds Ok, Labs CXR ok tiny air leak , drainage 20 cc.  Plan mariya etomarrow on home abx    Traci Barrios MD

## 2021-12-26 NOTE — PROGRESS NOTES
Hospitalist Progress Note    Subjective:   Daily Progress Note: 12/26/2021 11:13 AM    Hospital Course:  Nahomy Cornell is a 75-year-old male admitted on 12/8/2021 with a history of a hypertension and asthma who [place] due to worsening shortness of breath and productive cough. Patient with leukocytosis. CT scan of the chest showing a large multiloculated right pleural effusion consistent with an empyema. There was also a left apical nodule and tree-in-bud appearance with multiple nodules in the left lower lobe. Patient with acute respiratory failure requiring supplemental oxygenation. Patient was found to be a thoracentesis to have Streptococcus angiosis. Infectious disease consult, Dr. Papito Melendez currently treating with Zosyn. Decortication planned for 12/21/2021 by Dr. Traci Barrios. Patient with an indwelling chest tube and purulent drainage noted from the right chest.  AFB smear negative, Quantiferon negative as well as acid-fast bacilli culture. Blood cultures have been negative. Pulmonary consultation, Dr. Martha Crowell. Patient underwent VATS with right thoracotomy and empyemectomy on 12/21/2021 by Dr. Brock Dominguez. Post procedure pneumothorax. Subjective:    Patient seen and examined at bedside. POD #5 empyema  He did have a temp of 101.0 F last night.    Morning labs pending    Current Facility-Administered Medications   Medication Dose Route Frequency    oxyCODONE-acetaminophen (PERCOCET) 5-325 mg per tablet 2 Tablet  2 Tablet Oral Q4H PRN    multivitamin with folic acid (ONE DAILY WITH FOLIC ACID) tablet 1 Tablet  1 Tablet Oral DAILY    ferrous sulfate tablet 325 mg  1 Tablet Oral DAILY WITH BREAKFAST    ketorolac (TORADOL) injection 15 mg  15 mg IntraVENous Q6H    famotidine (PEPCID) tablet 20 mg  20 mg Oral BID    diphenhydrAMINE (BENADRYL) injection 12.5 mg  12.5 mg IntraVENous Q4H PRN    nicotine (NICODERM CQ) 21 mg/24 hr patch 1 Patch  1 Patch TransDERmal DAILY    albuterol (PROVENTIL HFA, VENTOLIN HFA, PROAIR HFA) inhaler 2 Puff  2 Puff Inhalation Q6H PRN    budesonide-formoteroL (SYMBICORT) 160-4.5 mcg/actuation HFA inhaler 2 Puff  2 Puff Inhalation BID PRN    amLODIPine (NORVASC) tablet 5 mg  5 mg Oral DAILY    ondansetron (ZOFRAN ODT) tablet 4 mg  4 mg Oral Q8H PRN    piperacillin-tazobactam (ZOSYN) 3.375 g in 0.9% sodium chloride (MBP/ADV) 100 mL MBP  3.375 g IntraVENous Q8H    thiamine mononitrate (B-1) tablet 100 mg  100 mg Oral DAILY        Review of Systems  Constitutional: No fevers, No chills, No sweats, No fatigue, No Weakness  Eyes: No redness  Ears, nose, mouth, throat, and face: No nasal congestion, No sore throat, No voice change  Respiratory: No Shortness of Breath, No cough, No wheezing  Cardiovascular: Right pleuritic chest pain. No palpitations, No extremity edema  Gastrointestinal: No nausea, No vomiting, No diarrhea, No abdominal pain  Genitourinary: No frequency, No dysuria, No hematuria  Integument/breast: No skin lesion(s)   Neurological: No Confusion, No headaches, No dizziness      Objective:     Visit Vitals  BP (!) 151/81 (BP 1 Location: Left upper arm, BP Patient Position: At rest)   Pulse 79   Temp 98.7 °F (37.1 °C)   Resp 18   Ht 5' 11\" (1.803 m)   Wt 74.5 kg (164 lb 3.9 oz)   SpO2 98%   BMI 22.91 kg/m²    O2 Flow Rate (L/min): 1 l/min O2 Device: None (Room air)    Temp (24hrs), Av °F (37.2 °C), Min:97.7 °F (36.5 °C), Max:101 °F (38.3 °C)      No intake/output data recorded.  1901 -  0700  In: 3509.9 [P.O.:2735; I.V.:774.9]  Out: 9270 [Urine:9000]    PHYSICAL EXAM:  Constitutional: No acute distress  Skin: Extremities and face reveal no rashes. HEENT: Sclerae anicteric. Extra-occular muscles are intact. No oral ulcers. The neck is supple and no masses. Cardiovascular: Regular rate and rhythm. +S1/S2. No murmur or gallop. Respiratory:  Right chest tube in place. Decreased air entry over entire right lung field.  On supplemental oxygen. GI: Abdomen nondistended, soft, and nontender. Normal active bowel sounds. Rectal: Deferred   Musculoskeletal: No pitting edema of the lower legs. Able to move all ext  Neurological:  Patient is alert and oriented. Cranial nerves II-XII grossly intact  Psychiatric: Mood appears appropriate       Data Review    No results found for this or any previous visit (from the past 24 hour(s)). XR CHEST PORT   Final Result   Findings/impression:      There are 2 right-sided chest tubes present, one dilated towards the apex, and   the other chest tube projecting over the right lower chest, similar to prior   study. There is small lucency in the right apex and right lung base indicating   small right pneumothorax, grossly similar to prior study. No tension physiology. Right infrahilar heterogeneous airspace disease present. Mild left infrahilar   airspace disease present. Obscured right cardiac silhouette. Normal left   cardiomediastinal silhouette. No suspicious osseous lesions. There is mild soft   tissue emphysema involving right lower lateral chest wall near the chest tubes,   similar to prior study. No significant interval change. XR CHEST PORT   Final Result   1. Smaller right basilar pneumothorax. 2. Moderate right basilar airspace disease, similar to the prior exam.   3. Mild left basilar airspace disease, similar to the prior exam.      XR CHEST PORT   Final Result      XR CHEST PORT   Final Result      XR CHEST PORT   Final Result   1. Diminished right pneumothorax. 2. Probable free subdiaphragmatic gas in this patient with a right base chest   tube. 3. I reported the findings to the patient's nurse by telephone at 8:50 PM on   12/21/2021. The nurse indicated that she would report the findings to the   attending physician. XR CHEST PORT   Final Result      XR CHEST PORT   Final Result   No significant interval change.       CT CHEST WO CONT   Final Result   Complex findings in the right hemithorax as above, question related to   infection. Small left pleural effusion and mild left basilar atelectasis. Right hilar and mild mediastinal adenopathy is nonspecific, may be reactive. Inferior         XR CHEST PORT   Final Result   1. There is residual airspace disease versus atelectasis within the right mid   to lower lung zones without short-term interval changes. 2.  There has been partial evacuation of a  right-sided empyema with moderate   residual areas of loculated pleural fluid and gas without short-term interval   changes. 3.  On recent CT imaging from 12/8/2021, there was demonstrated a massive right   pleural effusion as well as tree-in-bud nodular densities within the left lung. These findings can be associated with tuberculosis and nontuberculous   mycobacterial infections. Please correlate clinically. XR CHEST PORT   Final Result      XR CHEST PORT   Final Result      XR CHEST PORT   Final Result      XR CHEST PORT   Final Result      IR THORACENTESIS/INSERT CHEST TUBE   Final Result   Ultrasound of the right upper back was performed with images stored in PACS,   demonstrating heterogeneous collection most consistent with emphysema. Successful US-guided 8.5 percutaneous drainage catheter placement into right   empyema. Plan:   Continue flushing the catheter using 10 ml sterile normal saline and record the   output at least once a day. Consider catheter removal if there is less than 20 mL output over 24 hours. XR CHEST PORT    (Results Pending)   XR CHEST PORT    (Results Pending)       Active Problems:    Pleural effusion (12/8/2021)        Assessment/Plan:     1. Right multiloculated pleural effusion consistent with empyema  Cardiothoracic surgery and decortication 12/21/2021  Continue Zosyn  ID consultation  Pulmonary consultation  Postprocedural pneumothorax and acute respiratory failure with hypoxia.   2 chest tubes placed during the decortication  Pain management  Subdiaphragmatic air as surgery required intraperitoneal approach for decortication     2. Sepsis  Secondary #1  Febrile last night. Continue to monitor.      3. History of alcohol and tobacco abuse  Continue Ativan as needed  Nicotine patch     4.  Essential hypertension  Continue Norvasc        DVT prophylaxis: Lovenox on hold  Code Status: Full code  POA:    Discharge Barriers: Will likely be ready for discharge with chest tube on Monday. Pending fever resolution, repeat labs. Care Plan discussed with: patient and nursing    Total time spent with patient: >35 minutes.

## 2021-12-26 NOTE — PROGRESS NOTES
Pulmonary, Critical Care    Name: Quinton Ramirez MRN: [id number]   : 1970 Hospital: [facility]   Date: 2021  Admission date: 2021 Hospital Day:        Subjective/Interval History:   Seen on the medical floor awake alert. History is 2 or so weeks of breathing difficulty. Started with severe chest pain pleuritic with chills and sweats he did not take his temperature but felt hot at times. He has had continued worsening presented to the emergency room has a large right pleural effusion. CT scan suggests multiloculated effusion. Leukocytosis up to 34,000 all consistent with empyema. He is a drinker of 1-2 sixpacks a day. Does drink to the point of falling asleep although he states he is never passed out. 12/10 percutaneous drainage catheter placed by IR yesterday with 1600 cc drainage overnight and so far today 700   chest catheter continues to drain he states he feels a little better. Chest x-ray today has small apical pneumothorax with significant amount of remaining pleural fluid   awake alert states he feels better   no specific complaints respirations   for decortication on  status post decortication and VATS    sitting up in the chair on room air saturation 98%. Still has small air leak from chest tube    Hospital Problems  Date Reviewed: 2021          Codes Class Noted POA    Pleural effusion ICD-10-CM: J90  ICD-9-CM: 511.9  2021 Unknown              IMPRESSION:   1. Status post right-sided VATS right thoracotomy with decortication on   2. S/P empyema  3. Chest tube in place with persistent small air leak  4. Alcohol abuse  5. COPD exacerbation no wheezing  Body mass index is 22.91 kg/m². RECOMMENDATIONS/PLAN:   1. Pleural fluid loculated positive for Streptococcus anguosus now on Augmentin  2.  Status post VATS right thoracotomy and decortication, he is comfortable alert awake not in any distress  3. Chest tube in place small air leak  4. No wheezing  bronchodilators now inhalers  5. [x] High complexity decision making was performed  [x] See my orders for details      Subjective/Initial History:     I was asked by Zulma Kussmaul, MD to see Quinton Ramirez  a 46 y.o.  male in consultation for a chief complaint of multiloculated right pleural effusion likely empyema      No Known Allergies     MAR reviewed and pertinent medications noted or modified as needed     Current Facility-Administered Medications   Medication    [START ON 12/27/2021] amoxicillin-clavulanate (AUGMENTIN) 500-125 mg per tablet 1 Tablet    oxyCODONE-acetaminophen (PERCOCET) 5-325 mg per tablet 2 Tablet    multivitamin with folic acid (ONE DAILY WITH FOLIC ACID) tablet 1 Tablet    ferrous sulfate tablet 325 mg    famotidine (PEPCID) tablet 20 mg    diphenhydrAMINE (BENADRYL) injection 12.5 mg    nicotine (NICODERM CQ) 21 mg/24 hr patch 1 Patch    albuterol (PROVENTIL HFA, VENTOLIN HFA, PROAIR HFA) inhaler 2 Puff    budesonide-formoteroL (SYMBICORT) 160-4.5 mcg/actuation HFA inhaler 2 Puff    amLODIPine (NORVASC) tablet 5 mg    ondansetron (ZOFRAN ODT) tablet 4 mg    piperacillin-tazobactam (ZOSYN) 3.375 g in 0.9% sodium chloride (MBP/ADV) 100 mL MBP    thiamine mononitrate (B-1) tablet 100 mg      Patient PCP: None  PMH:  has a past medical history of Asthma and Ill-defined condition. PSH:   has a past surgical history that includes ir thoracentesis/insert chest tube (12/9/2021). FHX: family history is not on file. SHX:  reports that he has quit smoking. He has never used smokeless tobacco. He reports current alcohol use. He reports previous drug use. To me he admits smoking 1 to 2 packs/day ever since he was 8 or 10.   He drinks 1-2 sixpacks a day beer     Systemic review:  General states his weight stable has been having chills and fever for the last 2 weeks or more  Eyes no double vision or momentary blindness  ENT no drainage or facial pain  Musculoskeletal no swollen tender joints  Endocrinologic no polyuria polydipsia  Neurologic no seizures or syncope  Gastrointestinal no nausea vomiting acid indigestion. Genitourinary no pain or discomfort on urination  Cardiovascular no history of heart disease or ankle edema he has had the pleuritic chest pain no diaphoresis  Respiratory as mentioned cough some yellow sputum fever chills shortness of breath and pleuritic pain on the right    Objective:     Vital Signs: Telemetry:    normal sinus rhythm Intake/Output:   Visit Vitals  BP (!) 151/81 (BP 1 Location: Left upper arm, BP Patient Position: At rest)   Pulse 79   Temp 98.7 °F (37.1 °C)   Resp 18   Ht 5' 11\" (1.803 m)   Wt 74.5 kg (164 lb 3.9 oz)   SpO2 98%   BMI 22.91 kg/m²       Temp (24hrs), Av °F (37.2 °C), Min:97.7 °F (36.5 °C), Max:101 °F (38.3 °C)        O2 Device: None (Room air) O2 Flow Rate (L/min): 1 l/min       Wt Readings from Last 4 Encounters:   12/15/21 74.5 kg (164 lb 3.9 oz)   21 73.5 kg (162 lb)          Intake/Output Summary (Last 24 hours) at 2021 1336  Last data filed at 2021 0616  Gross per 24 hour   Intake 1885 ml   Output 3670 ml   Net -1785 ml       Last shift:      No intake/output data recorded. Last 3 shifts:  190 -  0700  In: 3509.9 [P.O.:2735; I.V.:774.9]  Out: 9270 [Urine:9000]       Physical Exam:   General:  male; sitting up in the chair with no distress  HEENT: NCAT, poor dentition,  Eyes: anicteric; conjunctiva clear extraocular movements intact  Neck: no nodes, no JVD, no accessory MM use. Chest: no deformity,   Cardiac: Regular rate and rhythm  Lungs: Small air leak persists lungs are clear anteriorly and upper lungs posteriorly with slightly reduced toward the right base  Abd: Thin soft positive bowel sounds  Ext: no edema; no joint swelling;  No clubbing  : clear urine  Neuro: Awake alert calm speech is clear moves all 4 extremities  Psych- no agitation, oriented to person;   Skin: warm, dry, no cyanosis;   Pulses: Brachial radial pulses intact  Capillary: Normal capillary refill    Labs:    Recent Labs     12/24/21  1004   WBC 14.8*   HGB 9.9*   *     12/26 room air oxygen saturation 98%  Pleural fluid WBC count 47,840 with 56% polys 32% lymphs pleural fluid protein 0.8 glucose 4  Pleural culture with  microaerophilic Streptococcus    Lab Results   Component Value Date/Time    Culture result: NO GROWTH ON SOLID MEDIA AT 4 DAYS 12/21/2021 09:45 AM    Culture result:  12/21/2021 09:45 AM     Streptococcus constellatus isolated from Thio broth only    Culture result:  12/21/2021 09:45 AM     (NOTE) STREP SPECIES GROWING IN BROTH ONLY CALLED TO ISABEL PIERCE [email or web address] ON 12/24/21 AT 1240.      Imaging:    CXR Results  (Last 48 hours)  12/26 chest x-ray left side clear right still has right lower lobe density questionable pleural with tiny subpleural air in the right base and tiny right apical pneumo               12/12/21 0854  XR CHEST PORT Final result    Narrative:  Chest single view. Comparison single view chest December 11, 2021. Similar appearance for the right hemithorax. Predominant right basilar airspace opacity, similar volume. Unchanged hydropneumothorax. Similar degree apical pleural separation. Left lung aerated. Cardiac and mediastinal structures unchanged. To me the x-ray is improved you can start to see the right hemidiaphragm now implying further decrease in effusion in the apical pneumothorax is minimally improved       12/11/21 0752  XR CHEST PORT Final result    Narrative:  Chest single view. Comparison single view chest December 8, 2021. Right-sided hydropneumothorax. Estimated moderate volume pleural fluid. 1.1 cm   apical pleural separation. Left lung aerated. Cardiac and mediastinal structures   unchanged.            Results from [facility] encounter on 12/08/21    XR CHEST PORT    Narrative  Exam: Single view frontal chest    Comparison: Prior exams dating to  12/17/2021, the most recent earlier today    Findings: Right chest tubes appear stable. There is probable free  subdiaphragmatic gas. A right base chest tube overlies the free air. Diminished  right pneumothorax since earlier today. The apex of the lung is now 2.3 cm from  the superior chest wall. Mediastinum remains midline. Persistent though  diminished right lung atelectasis. No developing pulmonary parenchymal or  pleural lesion. Impression  1. Diminished right pneumothorax. 2. Probable free subdiaphragmatic gas in this patient with a right base chest  tube. 3. I reported the findings to the patient's nurse by telephone at 8:50 PM on  12/21/2021. The nurse indicated that she would report the findings to the  attending physician. XR CHEST PORT    Narrative  Exam: Single view frontal chest    Comparison: Prior exams dating to  12/21/2021 , the most recent yesterday    Findings: Right chest tubes as before. Small residual right pneumothorax as  before. Mediastinum midline. Right base atelectasis and/or pneumonia as before. No developing pulmonary parenchymal or pleural lesion. Impression  Stable. XR CHEST PORT    Narrative  AP upright chest x-ray 0817 hours. COMPARISON: Chest x-ray 12/24/2021. Impression  Findings/impression:    There are 2 right-sided chest tubes present, one dilated towards the apex, and  the other chest tube projecting over the right lower chest, similar to prior  study. There is small lucency in the right apex and right lung base indicating  small right pneumothorax, grossly similar to prior study. No tension physiology. Right infrahilar heterogeneous airspace disease present. Mild left infrahilar  airspace disease present. Obscured right cardiac silhouette. Normal left  cardiomediastinal silhouette. No suspicious osseous lesions.  There is mild soft  tissue emphysema involving right lower lateral chest wall near the chest tubes,  similar to prior study. No significant interval change. Results from [place] encounter on 12/08/21    CT CHEST WO CONT    Narrative  Noncontrast exam.  Comparison chest radiograph yesterday. Dose Reduction:  All CT scans at this facility are performed using dose reduction optimization  techniques as appropriate to a performed exam including the following: Automated  exposure control, adjustments of the mA and/or kV according to patient size, or  use of iterative reconstruction technique. 6 x 6 x 3.5 cm loculated collection with an air-fluid level in the right apex. Complex densities in the right mid and lower lower lung including thick-walled  lesion containing central well-defined gas lucencies, potentially loculated 6 cm  fluid collection in the lateral right midlung, and small posterior inferior  fluid containing a pigtail drain and multiple gas bubbles in a pattern  suggesting that the fluid is highly viscous. Multiple air bronchograms are noted  within these complex densities. Small left pleural effusion and left basilar atelectasis. No anterior pneumothorax. Right hilar and mediastinal adenopathy, likely reactive. Atherosclerosis, including coronary artery calcification. Impression  Complex findings in the right hemithorax as above, question related to  infection. Small left pleural effusion and mild left basilar atelectasis. Right hilar and mild mediastinal adenopathy is nonspecific, may be reactive. Inferior      · 12/9 discussion 2-week history of pleuritic pain cough chills sweats fever and a drinker who will drink to the point of falling asleep most likely has aspiration pneumonia with empyema. IR has been consulted for joellen peters.   He very likely will have multiple loculations preventing drainage and may need VATS  · 12/11 feels fine respirations nonlabored has just an occasional air bubble when he coughs chest x-ray with small apical pneumothorax significant fluid remains although it is certainly improved culture consistent with oral potrillo to remain on Zosyn WBC count improved we will discontinue vancomycin  · 12/12 doing well at this point no air leak seen on chest tube and chest x-ray is mildly improved. Continue current treatment  · 12/13 drainage has decreased from the chest catheter. Will flush with saline and follow drainage.   Chest x-ray is improved diaphragm can be seen on the right with infiltrate in that area likely representing pneumonia cannot tell how much fluid remains  · 12/15 chest x-ray   · 12/17 on antibiotic no chest pain repeat chest x-ray shows infiltrate atelectasis right midlung and improvement in there right-sided pleural loculated effusion  · 12/26 no change status post VATS decortication chest tube in place small air leak    Nieves Sheehan MD

## 2021-12-26 NOTE — PROGRESS NOTES
Problem: Falls - Risk of  Goal: *Absence of Falls  Description: Document Trang Harris Fall Risk and appropriate interventions in the flowsheet.   Outcome: Progressing Towards Goal  Note: Fall Risk Interventions:  Mobility Interventions: Patient to call before getting OOB         Medication Interventions: Patient to call before getting OOB    Elimination Interventions: Call light in reach

## 2021-12-27 ENCOUNTER — APPOINTMENT (OUTPATIENT)
Dept: GENERAL RADIOLOGY | Age: 51
DRG: 853 | End: 2021-12-27
Attending: THORACIC SURGERY (CARDIOTHORACIC VASCULAR SURGERY)
Payer: COMMERCIAL

## 2021-12-27 VITALS
OXYGEN SATURATION: 97 % | SYSTOLIC BLOOD PRESSURE: 137 MMHG | BODY MASS INDEX: 22.99 KG/M2 | TEMPERATURE: 98.9 F | HEIGHT: 71 IN | DIASTOLIC BLOOD PRESSURE: 73 MMHG | HEART RATE: 77 BPM | RESPIRATION RATE: 18 BRPM | WEIGHT: 164.24 LBS

## 2021-12-27 PROBLEM — F10.10 ALCOHOL ABUSE: Status: ACTIVE | Noted: 2021-12-27

## 2021-12-27 PROBLEM — Z96.89 CHEST TUBE IN PLACE: Status: ACTIVE | Noted: 2021-12-27

## 2021-12-27 PROBLEM — Z98.890 S/P THORACOTOMY: Status: ACTIVE | Noted: 2021-12-27

## 2021-12-27 PROBLEM — J86.9 EMPYEMA OF LUNG (HCC): Status: ACTIVE | Noted: 2021-12-27

## 2021-12-27 PROBLEM — J44.1 COPD WITH ACUTE EXACERBATION (HCC): Status: ACTIVE | Noted: 2021-12-27

## 2021-12-27 LAB
ANION GAP SERPL CALC-SCNC: 5 MMOL/L (ref 5–15)
BASOPHILS # BLD: 0.1 K/UL (ref 0–0.1)
BASOPHILS NFR BLD: 1 % (ref 0–1)
BUN SERPL-MCNC: 3 MG/DL (ref 6–20)
BUN/CREAT SERPL: 5 (ref 12–20)
CA-I BLD-MCNC: 8.9 MG/DL (ref 8.5–10.1)
CHLORIDE SERPL-SCNC: 102 MMOL/L (ref 97–108)
CO2 SERPL-SCNC: 32 MMOL/L (ref 21–32)
CREAT SERPL-MCNC: 0.59 MG/DL (ref 0.7–1.3)
DIFFERENTIAL METHOD BLD: ABNORMAL
EOSINOPHIL # BLD: 0.2 K/UL (ref 0–0.4)
EOSINOPHIL NFR BLD: 1 % (ref 0–7)
ERYTHROCYTE [DISTWIDTH] IN BLOOD BY AUTOMATED COUNT: 13 % (ref 11.5–14.5)
GLUCOSE SERPL-MCNC: 100 MG/DL (ref 65–100)
HCT VFR BLD AUTO: 30.6 % (ref 36.6–50.3)
HGB BLD-MCNC: 10.1 G/DL (ref 12.1–17)
IMM GRANULOCYTES # BLD AUTO: 0.1 K/UL (ref 0–0.04)
IMM GRANULOCYTES NFR BLD AUTO: 1 % (ref 0–0.5)
LYMPHOCYTES # BLD: 2 K/UL (ref 0.8–3.5)
LYMPHOCYTES NFR BLD: 12 % (ref 12–49)
MCH RBC QN AUTO: 26.9 PG (ref 26–34)
MCHC RBC AUTO-ENTMCNC: 33 G/DL (ref 30–36.5)
MCV RBC AUTO: 81.4 FL (ref 80–99)
MONOCYTES # BLD: 1.1 K/UL (ref 0–1)
MONOCYTES NFR BLD: 7 % (ref 5–13)
NEUTS SEG # BLD: 13.1 K/UL (ref 1.8–8)
NEUTS SEG NFR BLD: 78 % (ref 32–75)
NRBC # BLD: 0 K/UL (ref 0–0.01)
NRBC BLD-RTO: 0 PER 100 WBC
PLATELET # BLD AUTO: 759 K/UL (ref 150–400)
PMV BLD AUTO: 9.5 FL (ref 8.9–12.9)
POTASSIUM SERPL-SCNC: 3.2 MMOL/L (ref 3.5–5.1)
RBC # BLD AUTO: 3.76 M/UL (ref 4.1–5.7)
SODIUM SERPL-SCNC: 139 MMOL/L (ref 136–145)
WBC # BLD AUTO: 16.6 K/UL (ref 4.1–11.1)

## 2021-12-27 PROCEDURE — 36415 COLL VENOUS BLD VENIPUNCTURE: CPT

## 2021-12-27 PROCEDURE — 74011250637 HC RX REV CODE- 250/637: Performed by: INTERNAL MEDICINE

## 2021-12-27 PROCEDURE — 74011000258 HC RX REV CODE- 258: Performed by: THORACIC SURGERY (CARDIOTHORACIC VASCULAR SURGERY)

## 2021-12-27 PROCEDURE — 85025 COMPLETE CBC W/AUTO DIFF WBC: CPT

## 2021-12-27 PROCEDURE — 74011250637 HC RX REV CODE- 250/637: Performed by: NURSE PRACTITIONER

## 2021-12-27 PROCEDURE — 80048 BASIC METABOLIC PNL TOTAL CA: CPT

## 2021-12-27 PROCEDURE — 71045 X-RAY EXAM CHEST 1 VIEW: CPT

## 2021-12-27 PROCEDURE — 74011250637 HC RX REV CODE- 250/637: Performed by: THORACIC SURGERY (CARDIOTHORACIC VASCULAR SURGERY)

## 2021-12-27 PROCEDURE — 97530 THERAPEUTIC ACTIVITIES: CPT

## 2021-12-27 PROCEDURE — 99024 POSTOP FOLLOW-UP VISIT: CPT | Performed by: THORACIC SURGERY (CARDIOTHORACIC VASCULAR SURGERY)

## 2021-12-27 PROCEDURE — 97165 OT EVAL LOW COMPLEX 30 MIN: CPT

## 2021-12-27 PROCEDURE — 74011250636 HC RX REV CODE- 250/636: Performed by: THORACIC SURGERY (CARDIOTHORACIC VASCULAR SURGERY)

## 2021-12-27 PROCEDURE — 97161 PT EVAL LOW COMPLEX 20 MIN: CPT

## 2021-12-27 RX ORDER — BUDESONIDE AND FORMOTEROL FUMARATE DIHYDRATE 160; 4.5 UG/1; UG/1
2 AEROSOL RESPIRATORY (INHALATION)
Qty: 10.2 G | Refills: 1 | Status: SHIPPED | OUTPATIENT
Start: 2021-12-27

## 2021-12-27 RX ORDER — ALBUTEROL SULFATE 90 UG/1
2 AEROSOL, METERED RESPIRATORY (INHALATION)
Qty: 18 G | Refills: 0 | Status: SHIPPED | OUTPATIENT
Start: 2021-12-27

## 2021-12-27 RX ORDER — OXYCODONE AND ACETAMINOPHEN 5; 325 MG/1; MG/1
1 TABLET ORAL
Qty: 20 TABLET | Refills: 0 | Status: SHIPPED | OUTPATIENT
Start: 2021-12-27 | End: 2021-12-27

## 2021-12-27 RX ORDER — OXYCODONE AND ACETAMINOPHEN 5; 325 MG/1; MG/1
2 TABLET ORAL
Qty: 90 TABLET | Refills: 0 | Status: SHIPPED | OUTPATIENT
Start: 2021-12-27 | End: 2022-01-06 | Stop reason: SDUPTHER

## 2021-12-27 RX ORDER — LANOLIN ALCOHOL/MO/W.PET/CERES
325 CREAM (GRAM) TOPICAL
Qty: 30 TABLET | Refills: 0 | Status: SHIPPED | OUTPATIENT
Start: 2021-12-27 | End: 2021-12-27

## 2021-12-27 RX ORDER — AMOXICILLIN AND CLAVULANATE POTASSIUM 500; 125 MG/1; MG/1
1 TABLET, FILM COATED ORAL EVERY 12 HOURS
Qty: 28 TABLET | Refills: 0 | Status: SHIPPED | OUTPATIENT
Start: 2021-12-27 | End: 2022-01-10

## 2021-12-27 RX ORDER — ASPIRIN 325 MG/1
100 TABLET, FILM COATED ORAL DAILY
Qty: 30 TABLET | Refills: 0 | Status: SHIPPED | OUTPATIENT
Start: 2021-12-27 | End: 2021-12-27

## 2021-12-27 RX ORDER — FAMOTIDINE 20 MG/1
20 TABLET, FILM COATED ORAL 2 TIMES DAILY
Qty: 30 TABLET | Refills: 0 | Status: SHIPPED | OUTPATIENT
Start: 2021-12-27 | End: 2021-12-27

## 2021-12-27 RX ORDER — MULTIVITAMIN
1 TABLET ORAL DAILY
Qty: 30 TABLET | Refills: 0 | Status: SHIPPED | OUTPATIENT
Start: 2021-12-27 | End: 2021-12-27

## 2021-12-27 RX ADMIN — FERROUS SULFATE TAB 325 MG (65 MG ELEMENTAL FE) 325 MG: 325 (65 FE) TAB at 08:00

## 2021-12-27 RX ADMIN — THIAMINE HCL TAB 100 MG 100 MG: 100 TAB at 09:28

## 2021-12-27 RX ADMIN — MULTIVITAMIN TABLET 1 TABLET: TABLET at 09:28

## 2021-12-27 RX ADMIN — AMOXICILLIN AND CLAVULANATE POTASSIUM 1 TABLET: 500; 125 TABLET, FILM COATED ORAL at 09:29

## 2021-12-27 RX ADMIN — PIPERACILLIN SODIUM AND TAZOBACTAM SODIUM 3.38 G: 3; .375 INJECTION, POWDER, LYOPHILIZED, FOR SOLUTION INTRAVENOUS at 02:34

## 2021-12-27 RX ADMIN — AMLODIPINE BESYLATE 5 MG: 5 TABLET ORAL at 09:28

## 2021-12-27 RX ADMIN — OXYCODONE AND ACETAMINOPHEN 2 TABLET: 5; 325 TABLET ORAL at 02:36

## 2021-12-27 RX ADMIN — FAMOTIDINE 20 MG: 20 TABLET ORAL at 09:28

## 2021-12-27 NOTE — PROGRESS NOTES
Problem: Falls - Risk of  Goal: *Absence of Falls  Description: Document Mansi Blood Fall Risk and appropriate interventions in the flowsheet.   Outcome: Progressing Towards Goal  Note: Fall Risk Interventions:  Mobility Interventions: Patient to call before getting OOB         Medication Interventions: Patient to call before getting OOB    Elimination Interventions: Call light in reach              Problem: Pain  Goal: *Control of Pain  Outcome: Progressing Towards Goal  Goal: *PALLIATIVE CARE:  Alleviation of Pain  Outcome: Progressing Towards Goal     Problem: Patient Education: Go to Patient Education Activity  Goal: Patient/Family Education  Outcome: Progressing Towards Goal

## 2021-12-27 NOTE — DISCHARGE SUMMARY
Discharge Summary     Patient: Guillermo Juarez MRN: [id number]  SSN: [id number]    YOB: 1970  Age: 46 y.o. Sex: male       Admit Date: 12/8/2021    Discharge Date: 12/27/2021      Admission Diagnoses: Pleural effusion [J90]    Discharge Diagnoses:   Problem List as of 12/27/2021 Date Reviewed: 12/21/2021          Codes Class Noted - Resolved    S/P thoracotomy ICD-10-CM: Z98.890  ICD-9-CM: V45.89  12/27/2021 - Present        * (Principal) Empyema of lung ([facility] ICD-10-CM: J86.9  ICD-9-CM: 510.9  12/27/2021 - Present        COPD with acute exacerbation ([facility] ICD-10-CM: J44.1  ICD-9-CM: 491.21  12/27/2021 - Present        Chest tube in place ICD-10-CM: Z96.89  ICD-9-CM: V49.89  12/27/2021 - Present        Alcohol abuse ICD-10-CM: F10.10  ICD-9-CM: 305.00  12/27/2021 - Present        Pleural effusion ICD-10-CM: J90  ICD-9-CM: 511.9  12/8/2021 - Present               Discharge Condition: stable    Hospital Course: Was admitted to hospital with empyema, treated with a drainage catheter for pure pus and growth of strep was eventually taken to OR for decortication and. He has donewell with that. CXR is clear, wounds ar ok,off oxygen I have removed one tube and hooked the other to a mini 500. He still is drianing 100cc a day and a tiny intermittent air leak  Disposition: home    Discharge Medications:   Current Discharge Medication List      START taking these medications    Details   albuterol (PROVENTIL HFA, VENTOLIN HFA, PROAIR HFA) 90 mcg/actuation inhaler Take 2 Puffs by inhalation every six (6) hours as needed for Wheezing or Shortness of Breath. Qty: 18 g, Refills: 0      amoxicillin-clavulanate (AUGMENTIN) 500-125 mg per tablet Take 1 Tablet by mouth every twelve (12) hours for 28 doses. Qty: 28 Tablet, Refills: 0      budesonide-formoteroL (SYMBICORT) 160-4.5 mcg/actuation HFAA Take 2 Puffs by inhalation two (2) times daily as needed (shortness breath).   Qty: 10.2 g, Refills: 1 oxyCODONE-acetaminophen (PERCOCET) 5-325 mg per tablet Take 2 Tablets by mouth every six (6) hours as needed for Pain for up to 30 days. Max Daily Amount: 8 Tablets. Qty: 90 Tablet, Refills: 0    Associated Diagnoses: S/P thoracotomy         CONTINUE these medications which have NOT CHANGED    Details   amLODIPine (NORVASC) 5 mg tablet Take 5 mg by mouth daily. Activity: ad renee  Diet: regular  Wound Care: none    Follow-up Appointments   Procedures    FOLLOW UP VISIT Appointment in: Other (Specify) Dr. Rupali Robles 12/29/21 call office [phone] to make appointment     Dr. Rupali Robles 12/29/21 call office [phone] to make appointment     Standing Status:   Standing     Number of Occurrences:   1     Order Specific Question:   Appointment in     Answer:    Other (Specify)       Signed By: Ghassan Chandler MD     December 27, 2021

## 2021-12-27 NOTE — ROUTINE PROCESS
Patient discharged home selfcare per HCP orders. Discharge education provided to the patient at bedside. Patient verbalized understanding. Discharge plan of care/case management plan validated with provider discharge order.

## 2021-12-27 NOTE — PROGRESS NOTES
Pulmonary, Critical Care    Name: Jose Garcia MRN: [id number]   : 1970 Hospital: [address] [place] [address]   Date: 2021  Admission date: 2021 Hospital Day:        Subjective/Interval History:   Seen on the medical floor awake alert. History is 2 or so weeks of breathing difficulty. Started with severe chest pain pleuritic with chills and sweats he did not take his temperature but felt hot at times. He has had continued worsening presented to the emergency room has a large right pleural effusion. CT scan suggests multiloculated effusion. Leukocytosis up to 34,000 all consistent with empyema. He is a drinker of 1-2 sixpacks a day. Does drink to the point of falling asleep although he states he is never passed out. 12/10 percutaneous drainage catheter placed by IR yesterday with 1600 cc drainage overnight and so far today  chest catheter continues to drain he states he feels a little better. Chest x-ray today has small apical pneumothorax with significant amount of remaining pleural fluid   awake alert states he feels better   no specific complaints respirations   for decortication on  status post decortication and VATS    sitting up in the chair on room air saturation 98%. Still has small air leak from chest tube   sitting up in the chair dressed ready to go home. Room air oxygen saturation 97%.   Respirations [facility] Problems  Date Reviewed: 2021          Codes Class Noted POA    S/P thoracotomy ICD-10-CM: Z98.890  ICD-9-CM: V45.89  2021 Unknown        * (Principal) Empyema of lung ([facility] [address] ICD-10-CM: J86.9  ICD-9-CM: 510.9  2021 Unknown        COPD with acute exacerbation ([facility] [address] ICD-10-CM: J44.1  ICD-9-CM: 491.21  2021 Unknown        Chest tube in place ICD-10-CM: Z96.89  ICD-9-CM: V49.89  2021 Unknown        Alcohol abuse ICD-10-CM: F10.10  ICD-9-CM: 305.00  2021 Unknown Pleural effusion ICD-10-CM: J90  ICD-9-CM: 511.9  12/8/2021 Unknown              IMPRESSION:   1. Status post right-sided VATS right thoracotomy with decortication on 12/21  2. S/P empyema  3. Chest tube in place with  small air leak  4. Alcohol abuse  5. COPD exacerbation no wheezing  Body mass index is 22.91 kg/m². RECOMMENDATIONS/PLAN:   1. Pleural fluid loculated positive for Streptococcus anguosus now on Augmentin  2. Status post VATS right thoracotomy and decortication, he is comfortable alert awake not in any distress  3. Chest tube in place small air leak  4. No wheezing  bronchodilators now inhalers  5. [x] High complexity decision making was performed  [x] See my orders for details      Subjective/Initial History:     I was asked by Justo Dallas MD to see Jose Garcia  a 46 y.o.  male in consultation for a chief complaint of multiloculated right pleural effusion likely empyema      No Known Allergies     MAR reviewed and pertinent medications noted or modified as needed     Current Facility-Administered Medications   Medication    amoxicillin-clavulanate (AUGMENTIN) 500-125 mg per tablet 1 Tablet    oxyCODONE-acetaminophen (PERCOCET) 5-325 mg per tablet 2 Tablet    budesonide-formoteroL (SYMBICORT) 160-4.5 mcg/actuation HFA inhaler 2 Puff    amLODIPine (NORVASC) tablet 5 mg      Patient PCP: None  PMH:  has a past medical history of Asthma and Ill-defined condition. PSH:   has a past surgical history that includes ir thoracentesis/insert chest tube (12/9/2021). FHX: family history is not on file. SHX:  reports that he has quit smoking. He has never used smokeless tobacco. He reports current alcohol use. He reports previous drug use. To me he admits smoking 1 to 2 packs/day ever since he was 8 or 10.   He drinks 1-2 sixpacks a day beer     Systemic review:  General states his weight stable has been having chills and fever for the last 2 weeks or more  Eyes no double vision or momentary blindness  ENT no drainage or facial pain  Musculoskeletal no swollen tender joints  Endocrinologic no polyuria polydipsia  Neurologic no seizures or syncope  Gastrointestinal no nausea vomiting acid indigestion. Genitourinary no pain or discomfort on urination  Cardiovascular no history of heart disease or ankle edema he has had the pleuritic chest pain no diaphoresis  Respiratory as mentioned cough some yellow sputum fever chills shortness of breath and pleuritic pain on the right    Objective:     Vital Signs: Telemetry:    normal sinus rhythm Intake/Output:   Visit Vitals  /73 (BP 1 Location: Left upper arm, BP Patient Position: At rest)   Pulse 77   Temp 98.9 °F (37.2 °C)   Resp 18   Ht 5' 11\" (1.803 m)   Wt 74.5 kg (164 lb 3.9 oz)   SpO2 97%   BMI 22.91 kg/m²       Temp (24hrs), Av.9 °F (37.2 °C), Min:98.5 °F (36.9 °C), Max:99.4 °F (37.4 °C)        O2 Device: None (Room air) O2 Flow Rate (L/min): 1 l/min       Wt Readings from Last 4 Encounters:   12/15/21 74.5 kg (164 lb 3.9 oz)   21 73.5 kg (162 lb)          Intake/Output Summary (Last 24 hours) at 2021 1341  Last data filed at 2021 0549  Gross per 24 hour   Intake 1300 ml   Output 2130 ml   Net -830 ml       Last shift:      No intake/output data recorded. Last 3 shifts:  1901 -  0700  In: 2300 [P.O.:2000; I.V.:300]  Out: 5100 [Urine:5000]       Physical Exam:   General:  male; sitting up in the chair with no distress  HEENT: NCAT, poor dentition,  Eyes: anicteric; conjunctiva clear extraocular movements intact  Neck: no nodes, no JVD, no accessory MM use. Chest: no deformity,   Cardiac: Regular rate and rhythm  Lungs: Small air leak persists lungs are clear anteriorly and upper lungs posteriorly with slightly reduced toward the right base  Abd: Thin soft positive bowel sounds  Ext: no edema; no joint swelling;  No clubbing  : clear urine  Neuro: Awake alert calm speech is clear moves all 4 extremities  Psych- no agitation, oriented to person;   Skin: warm, dry, no cyanosis;   Pulses: Brachial radial pulses intact  Capillary: Normal capillary refill    Labs:    Recent Labs     12/27/21  0944   WBC 16.6*   HGB 10.1*   *     12/26 room air oxygen saturation 98%  Pleural fluid WBC count 47,840 with 56% polys 32% lymphs pleural fluid protein 0.8 glucose 4  Pleural culture with  microaerophilic Streptococcus    Lab Results   Component Value Date/Time    Culture result: NO GROWTH ON SOLID MEDIA AT 4 DAYS 12/21/2021 09:45 AM    Culture result:  12/21/2021 09:45 AM     Streptococcus constellatus isolated from Thio broth only    Culture result:  12/21/2021 09:45 AM     (NOTE) STREP SPECIES GROWING IN BROTH ONLY CALLED TO [facility] ON 12/24/21 AT 1240.      Imaging:    CXR Results  (Last 48 hours)  12/26 chest x-ray left side clear right still has right lower lobe density questionable pleural with tiny subpleural air in the right base and tiny right apical pneumo               12/12/21 0854  XR CHEST PORT Final result    Narrative:  Chest single view. Comparison single view chest December 11, 2021. Similar appearance for the right hemithorax. Predominant right basilar airspace opacity, similar volume. Unchanged hydropneumothorax. Similar degree apical pleural separation. Left lung aerated. Cardiac and mediastinal structures unchanged. To me the x-ray is improved you can start to see the right hemidiaphragm now implying further decrease in effusion in the apical pneumothorax is minimally improved       12/11/21 0752  XR CHEST PORT Final result    Narrative:  Chest single view. Comparison single view chest December 8, 2021. Right-sided hydropneumothorax. Estimated moderate volume pleural fluid. 1.1 cm   apical pleural separation. Left lung aerated. Cardiac and mediastinal structures   unchanged.            Results from [facility] encounter on 12/08/21    XR CHEST PORT    Narrative  Exam: Single view frontal chest    Comparison: Prior exams dating to  12/17/2021, the most recent earlier today    Findings: Right chest tubes appear stable. There is probable free  subdiaphragmatic gas. A right base chest tube overlies the free air. Diminished  right pneumothorax since earlier today. The apex of the lung is now 2.3 cm from  the superior chest wall. Mediastinum remains midline. Persistent though  diminished right lung atelectasis. No developing pulmonary parenchymal or  pleural lesion. Impression  1. Diminished right pneumothorax. 2. Probable free subdiaphragmatic gas in this patient with a right base chest  tube. 3. I reported the findings to the patient's nurse by telephone at 8:50 PM on  12/21/2021. The nurse indicated that she would report the findings to the  attending physician. XR CHEST PORT    Narrative  Chest single view. Comparison single view chest December 26, 2021. Stable right-sided chest tubes x2. Unchanged right basilar atelectasis. Small right hydropneumothorax unchanged. Similar volume right subdiaphragmatic air. Small quantity subcutaneous air right chest wall. Left lung aerated. Cardiac and mediastinal structures unchanged. XR CHEST PORT    Narrative  Exam: Single view frontal chest    Comparison: Prior exams dating to  12/21/2021 , the most recent yesterday    Findings: Right chest tubes as before. Small residual right pneumothorax as  before. Mediastinum midline. Right base atelectasis and/or pneumonia as before. No developing pulmonary parenchymal or pleural lesion. Impression  Stable. Results from [place] encounter on 12/08/21    CT CHEST WO CONT    Narrative  Noncontrast exam.  Comparison chest radiograph yesterday.     Dose Reduction:  All CT scans at this facility are performed using dose reduction optimization  techniques as appropriate to a performed exam including the following: Automated  exposure control, adjustments of the mA and/or kV according to patient size, or  use of iterative reconstruction technique. 6 x 6 x 3.5 cm loculated collection with an air-fluid level in the right apex. Complex densities in the right mid and lower lower lung including thick-walled  lesion containing central well-defined gas lucencies, potentially loculated 6 cm  fluid collection in the lateral right midlung, and small posterior inferior  fluid containing a pigtail drain and multiple gas bubbles in a pattern  suggesting that the fluid is highly viscous. Multiple air bronchograms are noted  within these complex densities. Small left pleural effusion and left basilar atelectasis. No anterior pneumothorax. Right hilar and mediastinal adenopathy, likely reactive. Atherosclerosis, including coronary artery calcification. Impression  Complex findings in the right hemithorax as above, question related to  infection. Small left pleural effusion and mild left basilar atelectasis. Right hilar and mild mediastinal adenopathy is nonspecific, may be reactive. Inferior      · 12/9 discussion 2-week history of pleuritic pain cough chills sweats fever and a drinker who will drink to the point of falling asleep most likely has aspiration pneumonia with empyema. IR has been consulted for joellen peters. He very likely will have multiple loculations preventing drainage and may need VATS  · 12/11 feels fine respirations nonlabored has just an occasional air bubble when he coughs chest x-ray with small apical pneumothorax significant fluid remains although it is certainly improved culture consistent with oral portillo to remain on Zosyn WBC count improved we will discontinue vancomycin  · 12/12 doing well at this point no air leak seen on chest tube and chest x-ray is mildly improved. Continue current treatment  · 12/13 drainage has decreased from the chest catheter.   Will flush with saline and follow drainage.   Chest x-ray is improved diaphragm can be seen on the right with infiltrate in that area likely representing pneumonia cannot tell how much fluid remains  · 12/15 chest x-ray   · 12/17 on antibiotic no chest pain repeat chest x-ray shows infiltrate atelectasis right midlung and improvement in there right-sided pleural loculated effusion  · 12/26 no change status post VATS decortication chest tube in place small air leak  · 12/27 for discharge today with small chest tube    Erica Walker MD

## 2021-12-27 NOTE — PROGRESS NOTES
Problem: Falls - Risk of  Goal: *Absence of Falls  Description: Document Ceci Chiang Fall Risk and appropriate interventions in the flowsheet.   Outcome: Progressing Towards Goal  Note: Fall Risk Interventions:  Mobility Interventions: Patient to call before getting OOB         Medication Interventions: Patient to call before getting OOB    Elimination Interventions: Call light in reach    History of Falls Interventions: Bed/chair exit alarm         Problem: Patient Education: Go to Patient Education Activity  Goal: Patient/Family Education  Outcome: Progressing Towards Goal

## 2021-12-27 NOTE — DISCHARGE INSTRUCTIONS
Patient Education        Using a Metered-Dose Inhaler: Care Instructions  Overview     A metered-dose inhaler lets you breathe medicine into your lungs quickly. Inhaled medicine works faster than the same medicine in a pill. An inhaler allows you to take less medicine than you would need if you took it as a pill. \"Metered-dose\" means that the inhaler gives a measured amount of medicine each time you use it. A metered-dose inhaler gives medicine in the form of a liquid mist.  Your doctor may want you to use a spacer with your inhaler. A spacer is a chamber that you attach to the inhaler. The chamber holds the medicine before you inhale it. That way, you can inhale the medicine in as many breaths as you need. Doctors recommend using a spacer with most metered-dose inhalers. This is even more important when using corticosteroid medicines. Follow-up care is a key part of your treatment and safety. Be sure to make and go to all appointments, and call your doctor if you are having problems. It's also a good idea to know your test results and keep a list of the medicines you take. How can you care for yourself at home? To get started  · Talk with your health care provider to be sure you are using your inhaler the right way. It might help if you practice using it in front of a mirror. Use the inhaler exactly as prescribed. · Check that you have the correct medicine. If you use more than one inhaler, put a label on each one. This will let you know which one to use at the right time. · Keep track of how much medicine is in the inhaler. Check the label to see how many doses are in the container. If you know how many puffs you can take, you can replace the inhaler before you run out. Ask your health care provider how you can keep track of how much medicine is left. · Talk to your health care provider about using a spacer with your inhaler. Spacers make it easier to get the medicine into your lungs.  You may need a spacer if you are using corticosteroid medicines. A spacer can also help if you have problems pressing the inhaler and breathing in at the same time. · If you are using a corticosteroid inhaler, gargle and rinse out your mouth with water after use. Do not swallow the water. Swallowing the water will increase the chance that the medicine will get into your bloodstream. This may make it more likely that you will have side effects. To use a spacer with an inhaler  1. Shake the inhaler. Remove the inhaler cap, and place the mouthpiece of the inhaler into the spacer. Check the inhaler instructions to see if you need to prime your inhaler before you use it. If it needs priming, follow the instructions on how to prime your inhaler. 2. Remove the cap from the spacer. 3. Hold the inhaler upright with the mouthpiece at the bottom. 4. Tilt your head back a little, and breathe out slowly and completely. 5. Place the spacer's mouthpiece in your mouth. 6. Press down on the inhaler to spray one puff of medicine into the spacer, and then start breathing in slowly. Wait to inhale until after you have pressed down on the inhaler. Some spacers have a whistle. If you hear it, you should breathe in more slowly. 7. Hold your breath for 10 seconds. This will let the medicine settle in your lungs. 8. If you need to take a second dose, wait 30 to 60 seconds to allow the inhaler valve to refill. To use an inhaler without a spacer  1. Shake the inhaler as directed. Remove the cap. Check the instructions to see if you need to prime your inhaler before you use it. If it needs priming, follow the instructions on how to prime your inhaler. 2. Hold the inhaler upright with the mouthpiece at the bottom. 3. Tilt your head back a little, and breathe out slowly and completely. 4. Position the inhaler in one of two ways:  ? You can place the inhaler in your mouth. This is easier for most people.  And it lowers the risk that any of the medicine will get into your eyes. ? Or you can place the inhaler 1 to 2 inches in front of your open mouth, without closing your lips over it. Try to open your mouth as wide as you can. Placing the inhaler in front of your open mouth may be better for getting the medicine into your lungs. But some people may find this too hard to do. 5. Start taking slow, even breaths through your mouth. Press down on the inhaler once, then inhale fully. 6. Hold your breath for 10 seconds. This will let the medicine settle in your lungs. 7. If you need to take a second dose, wait 30 to 60 seconds to allow the inhaler valve to refill. Where can you learn more? Go to http://www.lyn.com/  Enter K111 in the search box to learn more about \"Using a Metered-Dose Inhaler: Care Instructions. \"  Current as of: February 24, 2020               Content Version: 12.7  © 2006-2020 [employer]. Care instructions adapted under license by [employer] (which disclaims liability or warranty for this information). If you have questions about a medical condition or this instruction, always ask your healthcare professional. [place] [address] any warranty or liability for your use of this information. Patient Education        Managing a Chest Tube: Care Instructions  Your Care Instructions     A chest tube is a hollow plastic tube. Your doctor put the tube into the space around your lungs to help remove air that shouldn't be there. It can also help drain fluid or blood. You may need the drain because of a punctured or collapsed lung (pneumothorax) or because of a surgery you had. A drain can also help remove pus from a serious chest infection, such as pneumonia. The tube will stay in your chest until all or most of the air, fluid, or blood drains out. This usually takes a few days.  Your doctor may attach the tube to a device that can help the space around your lungs drain better. The tube may have a one-way valve that lets air and fluid out, but not in. This helps keep the lungs working and allows them time to heal. It may be called a flutter or Heimlich valve, or it may be another type of valve. Your doctor or nurse will show you how to use the valve. If you are at home, you will need to care for the area around the chest tube and empty the container that it drains into. Follow-up care is a key part of your treatment and safety. Be sure to make and go to all appointments, and call your doctor if you are having problems. It's also a good idea to know your test results and keep a list of the medicines you take. How can you care for yourself at home? · Keep the area where the chest tube comes out of your body clean. Wash with soap and water. · Cover the area with a clean, dry bandage. Follow your doctor's instructions for how often to change the bandage. · If your tube drains into a container, empty the container or drainage bag before it gets full. · Learn how to empty the drainage container for your chest tube. You may be able to dump the container into the toilet. Or you may need to use a clean syringe to pull the fluid out. Your doctor or nurse will show you how to empty the container. · If your valve is covered with gauze, change the covering once a day or whenever it gets wet. · Do not take a bath or swim while you have a chest tube. Taking a shower is okay. · Keep a drainage record sheet so that your doctor can see how much fluid is coming from the tube. Your doctor or nurse will give you a record sheet. Or you can just write down the date, time, amount, and color of the drainage. Save the record sheet to show to your doctor. When should you call for help? Call 911  anytime you think you may need emergency care.  For example, call if:    · You passed out (lost consciousness).     · You have severe trouble breathing.     · You have chest pain, are short of breath, or cough up blood. Call your doctor now or seek immediate medical care if:    · You have trouble breathing.     · Your shortness of breath is getting worse.     · Bright red blood soaks through the bandage over your incision.     · You have a fever.     · The chest tube comes out or breaks. Watch closely for any changes in your health, and be sure to contact your doctor if:    · You do not get better as expected. Where can you learn more? Go to http://www.gray.com/  Enter C140 in the search box to learn more about \"Managing a Chest Tube: Care Instructions. \"  Current as of: July 6, 2021               Content Version: 13.0  © 6755-3768 [employer]. Care instructions adapted under license by [employer] (which disclaims liability or warranty for this information). If you have questions about a medical condition or this instruction, always ask your healthcare professional. [address] any warranty or liability for your use of this information.

## 2021-12-27 NOTE — PROGRESS NOTES
CM reviewed clinical record then met with patient at the bedside to obtain choices for [facility] [place]. Patient has no preferences for [facility] [place]. Choice letter signed and placed on the chart. Clinicals uploaded via [employer] and referrals sent via the same. CM will continue to monitor for accepting [facility] [place].      Jyothi Rendon

## 2021-12-27 NOTE — PROGRESS NOTES
Infectious Diseases Progress Note  Cory Santana MD  [phone]  Date:2021       Room:[facility]  Patient Name:Jason Andersen     YOB: 1970     Age:51 y.o. 51M with history of hypertension, bronchitis, asthma. Long history of smoking. He reports prior vaccinations against coronavirus.     21 admitted to [facility] from [facility] after he was found to have a pleural effusion. He presents with two weeks of productive cough, shortness of breath, fevers, chills, generalized weakness. During the ED course underwent CT imaging which revealed large multiloculated right pleural effusion.     21 CT Chest  IMPRESSION  Large multiloculated right pleural effusion with subsequent near complete  atelectasis of the right lung. Therapeutic and diagnostic thoracentesis  recommended. Tree-in-bud centrilobular nodules present throughout the left lower lobe with  larger nodular airspace opacity apical posterior segment left upper lobe. Findings consistent with bronchiolitis/bronchopneumonia. The larger airspace  nodule in the apical posterior segment left upper lobe require surveillance.     21 underwent IR thoracentesis  IMPRESSION  Ultrasound of the right upper back was performed with images stored in PACS,  demonstrating heterogeneous collection most consistent with emphysema. Successful US-guided 8.5 percutaneous drainage catheter placement into right  empyema.      Pleural cultures reveal Streptococcus anginosus and I have been asked to see him in consultation. 21 taken to OR by Dr Gisele Dobbs for surgical management of Empyema  Procedure(s):  RIGHT VIDEO ASSISTED THORACIC SURGERY, Right THORACOTOMY, DECORTICATION     Subjective    Subjective:  Symptoms:  Stable. Review of Systems   All other systems reviewed and are negative.     Objective         Vitals Last 24 Hours:  TEMPERATURE:  Temp  Av.9 °F (37.2 °C)  Min: 98.5 °F (36.9 °C)  Max: 99.4 °F (37.4 °C)  RESPIRATIONS RANGE: Resp  Avg: 18  Min: 18  Max: 18  PULSE OXIMETRY RANGE: SpO2  Av.7 %  Min: 96 %  Max: 97 %  PULSE RANGE: Pulse  Av.3  Min: 77  Max: 85  BLOOD PRESSURE RANGE: Systolic (71AOF), BIJ:493 , Min:126 , NBH:251   ; Diastolic (52FQK), FFL:26, Min:73, Max:80    I/O (24Hr): Intake/Output Summary (Last 24 hours) at 2021 1221  Last data filed at 2021 0549  Gross per 24 hour   Intake 1300 ml   Output 2130 ml   Net -830 ml     Objective:  General Appearance:  Comfortable. Vital signs: (most recent): Blood pressure 137/73, pulse 77, temperature 98.9 °F (37.2 °C), resp. rate 18, height 5' 11\" (1.803 m), weight 74.5 kg (164 lb 3.9 oz), SpO2 97 %. Vital signs are normal.    HEENT: Normal HEENT exam.    Lungs:  Normal effort and normal respiratory rate. Heart: Normal rate. Regular rhythm. Abdomen: Abdomen is soft. Bowel sounds are normal.     Extremities: Normal range of motion. Pulses: Distal pulses are intact. Neurological: Patient is alert. Pupils:  Pupils are equal, round, and reactive to light. Skin:  Warm and dry. Labs/Imaging/Diagnostics    Labs:  CBC:  Recent Labs     21  0944   WBC 16.6*   RBC 3.76*   HGB 10.1*   HCT 30.6*   MCV 81.4   RDW 13.0   *     CHEMISTRIES:  Recent Labs     21  0944      K 3.2*      CO2 32   BUN 3*   CA 8.9   PT/INR:No results for input(s): INR, INREXT, INREXT in the last 72 hours. No lab exists for component: PROTIME  APTT:No results for input(s): APTT in the last 72 hours. LIVER PROFILE:  No results for input(s): AST, ALT in the last 72 hours. No lab exists for component: KOFI ÁlvarezPHOS  Lab Results   Component Value Date/Time    ALT (SGPT) 39 2021 08:24 AM    AST (SGOT) 45 (H) 2021 08:24 AM    Alk. phosphatase 207 (H) 2021 08:24 AM    Bilirubin, total 0.4 2021 08:24 AM       Imaging Last 24 Hours:  XR CHEST PORT    Result Date: 2021  Chest single view.  Comparison single view chest December 26, 2021. Stable right-sided chest tubes x2. Unchanged right basilar atelectasis. Small right hydropneumothorax unchanged. Similar volume right subdiaphragmatic air. Small quantity subcutaneous air right chest wall. Left lung aerated. Cardiac and mediastinal structures unchanged. Assessment//Plan   Principal Problem:    Empyema of lung ([address] (12/27/2021)    Active Problems:    Pleural effusion (12/8/2021)      S/P thoracotomy (12/27/2021)      COPD with acute exacerbation ([address] (12/27/2021)      Chest tube in place (12/27/2021)      Alcohol abuse (12/27/2021)      Assessment & Plan   51M with history of hypertension, bronchitis, asthma. Long history of smoking. He reports prior vaccinations against coronavirus.     12/8/21 admitted to [facility] from [facility] after he was found to have a pleural effusion. He presents with two weeks of productive cough, shortness of breath, fevers, chills, generalized weakness. During the ED course underwent CT imaging which revealed large multiloculated right pleural effusion.     12/8/21 CT Chest  IMPRESSION  Large multiloculated right pleural effusion with subsequent near complete  atelectasis of the right lung. Therapeutic and diagnostic thoracentesis  recommended. Tree-in-bud centrilobular nodules present throughout the left lower lobe with  larger nodular airspace opacity apical posterior segment left upper lobe. Findings consistent with bronchiolitis/bronchopneumonia. The larger airspace  nodule in the apical posterior segment left upper lobe require surveillance.     12/9/21 underwent IR thoracentesis  IMPRESSION  Ultrasound of the right upper back was performed with images stored in PACS,  demonstrating heterogeneous collection most consistent with emphysema. Successful US-guided 8.5 percutaneous drainage catheter placement into right  empyema.      Pleural cultures reveal Streptococcus anginosus and I have been asked to see him in consultation.     12/21/21 taken to OR by Dr Lori De Leon for surgical management of Empyema  Procedure(s):  RIGHT VIDEO ASSISTED THORACIC SURGERY, Right THORACOTOMY, DECORTICATION     MICROBIOLOGY     12/8/21            Covid19           Negative  12/8/21            Blood               Negative  12/9/21            Blood               Negative     12/9/21            IR pleural         Heavy Streptococcus anginosus     12/16/21          Quant TB         Negative    12/16/21 Pleural  AFB smear negative, AFB culture pending      Fungal negative so far    12/21/21 Pleural OR Streptococcus constellatus  12/21/21 OR fungal Pending  12/21/21 Urine  Negative     ASSESSMENT AND RECOMMENDATIONS     1) Empyema presenting as complex loculated pleural effusion in the setting of advanced aspiration pneumonia.  Status post VATS decortication for empyema as above.                 Zosyn iv while in hospital     Once stable for discharge, oral Augmentin to complete outpatient course                          Augmentin 875mg po bid through 1/7/22       Electronically signed by Stephen Perez MD on 12/27/2021 at 12:48 PM

## 2021-12-27 NOTE — DISCHARGE SUMMARY
Hospitalist Discharge Summary     Patient ID:    Ricardo Galvin  [id number]  67 y.o.  1970    Admit date: 12/8/2021    Discharge date : 12/27/2021    Chronic Diagnoses:    Problem List as of 12/27/2021 Date Reviewed: 12/21/2021          Codes Class Noted - Resolved    S/P thoracotomy ICD-10-CM: Z98.890  ICD-9-CM: V45.89  12/27/2021 - Present        * (Principal) Empyema of lung ([facility] ICD-10-CM: J86.9  ICD-9-CM: 510.9  12/27/2021 - Present        COPD with acute exacerbation ([facility] ICD-10-CM: J44.1  ICD-9-CM: 491.21  12/27/2021 - Present        Chest tube in place ICD-10-CM: Z96.89  ICD-9-CM: V49.89  12/27/2021 - Present        Alcohol abuse ICD-10-CM: F10.10  ICD-9-CM: 305.00  12/27/2021 - Present        Pleural effusion ICD-10-CM: J90  ICD-9-CM: 511.9  12/8/2021 - Present          22    Final Diagnoses:   Principal Problem:    Empyema of lung ([facility] (12/27/2021)    Active Problems:    Pleural effusion (12/8/2021)      S/P thoracotomy (12/27/2021)      COPD with acute exacerbation ([facility] (12/27/2021)      Chest tube in place (12/27/2021)      Alcohol abuse (12/27/2021)      Hospital Course:   Ricardo Galvin is a 78-year-old male admitted on 12/8/2021 with a history of a hypertension and asthma who was transferred from [place] [address] due to worsening shortness of breath and productive cough.  Patient with leukocytosis.  CT scan of the chest showing a large multiloculated right pleural effusion consistent with an empyema.  There was also a left apical nodule and tree-in-bud appearance with multiple nodules in the left lower lobe.  Patient with acute respiratory failure requiring supplemental oxygenation.  Patient was found to be a thoracentesis to have Streptococcus angiosis.  Infectious disease consult, Dr. Rainey Gip currently treating with Zosyn.  Decortication planned for 12/21/2021 by Dr. Stephany Wadsworth with an indwelling chest tube and purulent drainage noted from the right chest.  AFB smear negative, Quantiferon negative as well as acid-fast bacilli culture.  Blood cultures have been negative.  Pulmonary consultation, Dr. Kay Natarajan underwent VATS with right thoracotomy and empyemectomy on 12/21/2021 by Dr. Gisele Dobbs. Post procedure pneumothorax. Right chest tube in place. Follow-up chest x-ray showing small residual right pneumothorax and right base pneumonia. Patient will be discharged with chest tube. Cleared from cardiothoracic surgery and pulmonary perspective for discharge. Vitals stable. Discharge Medications:   Current Discharge Medication List      START taking these medications    Details   albuterol (PROVENTIL HFA, VENTOLIN HFA, PROAIR HFA) 90 mcg/actuation inhaler Take 2 Puffs by inhalation every six (6) hours as needed for Wheezing or Shortness of Breath. Qty: 18 g, Refills: 0  Start date: 12/27/2021      amoxicillin-clavulanate (AUGMENTIN) 500-125 mg per tablet Take 1 Tablet by mouth every twelve (12) hours for 28 doses. Qty: 28 Tablet, Refills: 0  Start date: 12/27/2021, End date: 1/10/2022      budesonide-formoteroL (SYMBICORT) 160-4.5 mcg/actuation HFAA Take 2 Puffs by inhalation two (2) times daily as needed (shortness breath). Qty: 10.2 g, Refills: 1  Start date: 12/27/2021      famotidine (PEPCID) 20 mg tablet Take 1 Tablet by mouth two (2) times a day. Qty: 30 Tablet, Refills: 0  Start date: 12/27/2021      ferrous sulfate 325 mg (65 mg iron) tablet Take 1 Tablet by mouth daily (with breakfast). Qty: 30 Tablet, Refills: 0  Start date: 12/27/2021      multivitamin with folic acid (ONE DAILY WITH FOLIC ACID) 148 mcg tab tablet Take 1 Tablet by mouth daily. Qty: 30 Tablet, Refills: 0  Start date: 12/27/2021      oxyCODONE-acetaminophen (PERCOCET) 5-325 mg per tablet Take 1 Tablet by mouth every six (6) hours as needed for Pain for up to 5 days. Max Daily Amount: 4 Tablets.   Qty: 20 Tablet, Refills: 0  Start date: 12/27/2021, End date: 1/1/2022    Associated Diagnoses: S/P thoracotomy      thiamine mononitrate (B-1) 100 mg tablet Take 1 Tablet by mouth daily. Qty: 30 Tablet, Refills: 0  Start date: 12/27/2021         CONTINUE these medications which have NOT CHANGED    Details   amLODIPine (NORVASC) 5 mg tablet Take 5 mg by mouth daily. Follow up Care:    1. None in 1-2 weeks. Follow-up Information     Follow up With Specialties Details Why Contact Info    Edgardo Youssef NP Nurse Practitioner Schedule an appointment as soon as possible for a visit in 2 weeks Establish PCP  [address] [place] [address]      Gera Barillas MD Pulmonary Disease Schedule an appointment as soon as possible for a visit in 2 weeks Hospital follow-up pneumonia, empyema, COPD exacerbation [address] [address] [phone]       [facility], [address] [place] [address] Vascular Surgery, Thoracic Surgery Schedule an appointment as soon as possible for a visit in 2 weeks Hospital follow-up empyema s/p VATs thoracotomy and chest tube placement [address] [place] [address]  [phone]              Patient Follow Up Instructions: Activity: Activity as tolerated  Diet:  Resume previous diet  Wound care: None required    Condition at Discharge:  Stable  __________________________________________________________________    Disposition  Home self ____________________________________________________________________    Code Status:  Full Code  ___________________________________________________________________    Discharge Exam:  Patient seen and examined by me on discharge day. Pertinent Findings:    Gen:    Not in distress  Chest: Decreased air entry right base. Right-sided chest tube in place. [address] air entry. On room air. CVS:   Regular rate and rhythm. +S1/S2. No murmur or gallop. No edema  Abd:  Soft, not distended, not tender. Active bowel sounds. Neuro:  Alert and oriented x3. CN II-XII grossly intact. CONSULTATIONS: Pulmonary/Intensive care, ID and Intervntional Radiology, Cardiothoracic Surgery    Significant Diagnostic Studies:   No results found for this or any previous visit (from the past 24 hour(s)). XR CHEST PORT   Final Result   Stable. XR CHEST PORT   Final Result   Findings/impression:      There are 2 right-sided chest tubes present, one dilated towards the apex, and   the other chest tube projecting over the right lower chest, similar to prior   study. There is small lucency in the right apex and right lung base indicating   small right pneumothorax, grossly similar to prior study. No tension physiology. Right infrahilar heterogeneous airspace disease present. Mild left infrahilar   airspace disease present. Obscured right cardiac silhouette. Normal left   cardiomediastinal silhouette. No suspicious osseous lesions. There is mild soft   tissue emphysema involving right lower lateral chest wall near the chest tubes,   similar to prior study. No significant interval change. XR CHEST PORT   Final Result   1. Smaller right basilar pneumothorax. 2. Moderate right basilar airspace disease, similar to the prior exam.   3. Mild left basilar airspace disease, similar to the prior exam.      XR CHEST PORT   Final Result      XR CHEST PORT   Final Result      XR CHEST PORT   Final Result   1. Diminished right pneumothorax. 2. Probable free subdiaphragmatic gas in this patient with a right base chest   tube. 3. I reported the findings to the patient's nurse by telephone at 8:50 PM on   12/21/2021. The nurse indicated that she would report the findings to the   attending physician. XR CHEST PORT   Final Result      XR CHEST PORT   Final Result   No significant interval change. CT CHEST WO CONT   Final Result   Complex findings in the right hemithorax as above, question related to   infection. Small left pleural effusion and mild left basilar atelectasis.    Right hilar and mild mediastinal adenopathy is nonspecific, may be reactive. Inferior         XR CHEST PORT   Final Result   1. There is residual airspace disease versus atelectasis within the right mid   to lower lung zones without short-term interval changes. 2.  There has been partial evacuation of a  right-sided empyema with moderate   residual areas of loculated pleural fluid and gas without short-term interval   changes. 3.  On recent CT imaging from 12/8/2021, there was demonstrated a massive right   pleural effusion as well as tree-in-bud nodular densities within the left lung. These findings can be associated with tuberculosis and nontuberculous   mycobacterial infections. Please correlate clinically. XR CHEST PORT   Final Result      XR CHEST PORT   Final Result      XR CHEST PORT   Final Result      XR CHEST PORT   Final Result      IR THORACENTESIS/INSERT CHEST TUBE   Final Result   Ultrasound of the right upper back was performed with images stored in PACS,   demonstrating heterogeneous collection most consistent with emphysema. Successful US-guided 8.5 percutaneous drainage catheter placement into right   empyema. Plan:   Continue flushing the catheter using 10 ml sterile normal saline and record the   output at least once a day. Consider catheter removal if there is less than 20 mL output over 24 hours.                   XR CHEST PORT    (Results Pending)           Signed:  Tyrese Rodriguez PA-C  12/27/2021  8:45 AM

## 2021-12-27 NOTE — PROGRESS NOTES
OCCUPATIONAL THERAPY EVALUATION/DISCHARGE  Patient: Rebel Lerma (49 y.o. male)  Date: 12/27/2021  Primary Diagnosis: Pleural effusion [J90]  Procedure(s) (LRB):  RIGHT VIDEO ASSISTED THORACIC SURGERY, RIGHT THORACOTOMY, DECORTICATION (Right) 6 Days Post-Op   Precautions: Chest tube       ASSESSMENT  Pt is a 47 y/o M with PMH of asthma presenting to [facility] with c/o SOB. Pt was referred to hospital by PCP as O2 sats were 89% on RA. Pt on arrival was 95% on RA. Pt admitted 12/8/21  and being treated for community acquired pneumonia of R lung and pleural effusion. Pt currently has a chest tube. Pt received semi-supine in bed upon arrival, AXO x4, and agreeable to OT/PT evaluation at this time. OT/PT completed evaluation together for increased safety of pt and clinician. Per pt report, pt lives with wife in a one-story home. Pt reports being IND with ADLs and mobility with no AD at OF. Based on current observations, pt presents at baseline for functional mobility/transfers and completion of self care and ADLs. Pt currently able to complete bed mobility IND including rolling, sup>sit EOB, sit EOB>sup, and scooting. Pt adjusted socks while long-sitting in bed with mod I. Pt completed sit>stand from EOB and stand>sit in recliner chair with mod I. Pt ambulated in room with PT (see PT note for details). Pt washed face while seated in chair with set up of wet wash cloth. Pt brushed teeth with set up to hand pt toothbrush and toothpaste while seated in chair. Overall, pt tolerates session fair. Pt has no skilled acute OT needs at this time noted by OT or reported by pt, will DC skilled OT following evaluation; pt verbalized understanding and agreement.      Current Level of Function (ADLs/self-care): IND/mod I    Other factors to consider for discharge: PLOF, home support     PLAN :    Recommendation for discharge: (in order for the patient to meet his/her long term goals)  Occupational therapy at least 2 days/week in the home     This discharge recommendation:  Has been made in collaboration with the attending provider and/or case management    IF patient discharges home will need the following DME: none       SUBJECTIVE:   Patient stated I feel fine walking around.     OBJECTIVE DATA SUMMARY:   HISTORY:   Past Medical History:   Diagnosis Date    Asthma     Ill-defined condition      Past Surgical History:   Procedure Laterality Date    IR THORACENTESIS/INSERT CHEST TUBE  12/9/2021       Prior Level of Function/Environment/Context: IND  Expanded or extensive additional review of patient history:   Home Situation  Home Environment: Private residence  One/Two Story Residence: One story  Living Alone: No  Support Systems: Spouse/Significant Other  Patient Expects to be Discharged to[de-identified] House  Current DME Used/Available at Home: None    Hand dominance: Right    EXAMINATION OF PERFORMANCE DEFICITS:  Cognitive/Behavioral Status:  Neurologic State: Alert  Orientation Level: Oriented X4  Cognition: Follows commands         Hearing: Auditory  Auditory Impairment: None      Range of Motion:  AROM: Generally decreased, functional                         Strength:  Strength: Generally decreased, functional                Coordination:     Fine Motor Skills-Upper: Left Intact; Right Intact    Gross Motor Skills-Upper: Left Intact; Right Intact    Tone & Sensation:  Tone: Normal                         Balance:  Sitting: Intact  Standing: Intact    Functional Mobility and Transfers for ADLs:  Bed Mobility:  Rolling: Independent  Supine to Sit: Independent  Sit to Supine: Independent  Scooting: Independent    Transfers:  Sit to Stand: Modified independent  Stand to Sit: Modified independent    ADL Intervention and task modifications:       Grooming  Position Performed: Seated in chair  Washing Face: Set-up  Brushing Teeth: Set-up    Lower Body Dressing Assistance  Socks: Modified independent (Adjusted socks)  Position Performed: Long sitting on bed             Functional Measure:    [facility] [employer] AM-PACTM \"6 Clicks\"                                                       Daily Activity Inpatient Short Form  How much help from another person does the patient currently need. .. Total; A Lot A Little None   1. Putting on and taking off regular lower body clothing? []  1 []  2 []  3 [x]  4   2. Bathing (including washing, rinsing, drying)? []  1 [x]  2 []  3 []  4   3. Toileting, which includes using toilet, bedpan or urinal? [] 1 []  2 []  3 [x]  4   4. Putting on and taking off regular upper body clothing? []  1 []  2 [x]  3 []  4   5. Taking care of personal grooming such as brushing teeth? []  1 []  2 []  3 [x]  4   6. Eating meals? []  1 []  2 []  3 [x]  4   © , Trustees of [facility] [employer], under license to [employer]. All rights reserved     Score:      Interpretation of Tool:  Represents clinically-significant functional categories (i.e. Activities of daily living). Percentage of Impairment CH    0%   CI    1-19% CJ    20-39% CK    40-59% CL    60-79% CM    80-99% CN     100%   AMPA  Score 6-24 24 23 20-22 15-19 10-14 7-9 6       Occupational Therapy Evaluation Charge Determination   History Examination Decision-Making   LOW Complexity : Brief history review  LOW Complexity : 1-3 performance deficits relating to physical, cognitive , or psychosocial skils that result in activity limitations and / or participation restrictions  LOW Complexity : No comorbidities that affect functional and no verbal or physical assistance needed to complete eval tasks       Based on the above components, the patient evaluation is determined to be of the following complexity level: LOW   Pain Ratin/10 chest tube insertion    Activity Tolerance:   Fair  Please refer to the flowsheet for vital signs taken during this treatment.     After treatment patient left in no apparent distress:    Sitting in chair and Call bell within reach    COMMUNICATION/EDUCATION:   The patients plan of care was discussed with: Physical therapist.     PT/OT sessions occurred together for increased safety of pt and clinician.       Thank you for this referral.  Maine Schulz OT  Time Calculation: 12 mins

## 2021-12-27 NOTE — PROGRESS NOTES
PHYSICAL THERAPY EVALUATION  Patient: Gabrielle Marie (21 y.o. male)  Date: 12/27/2021  Primary Diagnosis: Pleural effusion [J90]  Procedure(s) (LRB):  RIGHT VIDEO ASSISTED THORACIC SURGERY, RIGHT THORACOTOMY, DECORTICATION (Right) 6 Days Post-Op   Precautions: chest tube  ASSESSMENT  As per PA DC note(Jason Nettles is a 28-year-old male admitted on 12/8/2021 with a history of a hypertension and asthma who was transferred from [facility] due to worsening shortness of breath and productive cough. Patient with leukocytosis. CT scan of the chest showing a large multiloculated right pleural effusion consistent with an empyema. There was also a left apical nodule and tree-in-bud appearance with multiple nodules in the left lower lobe. Patient with acute respiratory failure requiring supplemental oxygenation. Patient was found to be a thoracentesis to have Streptococcus angiosis. Infectious disease consult, Dr. Joyce Correa currently treating with Zosyn. Decortication planned for 12/21/2021 by Dr. Byron Mckeon. Patient with an indwelling chest tube and purulent drainage noted from the right chest.  AFB smear negative, Quantiferon negative as well as acid-fast bacilli culture. Blood cultures have been negative. Pulmonary consultation, Dr. Mari Dey. Patient underwent VATS with right thoracotomy and empyemectomy on 12/21/2021 by Dr. Urvashi Chaves. Post procedure pneumothorax. Right chest tube in place. Follow-up chest x-ray showing small residual right pneumothorax and right base pneumonia. Patient will be discharged with chest tube. Cleared from cardiothoracic surgery and pulmonary perspective for discharge. Vitals stable.)  Pt A&O x 4. Per patient report pt resides with wife  in a 1 level home, pt was indep for ADLS/IADLS,  AD with mobility prior to admission. Based on the objective data described below, the patient presents with generalized weakness, impaired functional mobility, impaired amb, impaired balance, and endurance.  Pt semi supine upon PT arrival, agreeable to evaluation. Pt indep for (bed mobility,  supine <> sit,  sit <> stand) transfers. Pt amb 55 feet with sup; demonstrating steady gt pattern with no lob noted. Pt did good with session today with PT/OT. Pt will benefit from continued skilled PT to address above deficits and return to PLOF. Current PT DC recommendation HH due to chest tube. Patient was a little unhappy about moving with therapy. Patient was educated for importance of activities and breathing exs. PLAN :  Recommendations and Planned Interventions: DC PT after eval  Recommend with staff: needs to amb in hallways  Frequency/Duration: Patient will be followed by physical therapy:  eval and treat only  Recommendation for discharge: (in order for the patient to meet his/her long term goals)  Home with [address] [place] [address]    This discharge recommendation:  Has been made in collaboration with the attending provider and/or case management    IF patient discharges home will need the following DME: none         SUBJECTIVE:   Patient stated i am ok. Why do I have to walk?     OBJECTIVE DATA SUMMARY:   HISTORY:    Past Medical History:   Diagnosis Date    Asthma     Ill-defined condition      Past Surgical History:   Procedure Laterality Date    IR THORACENTESIS/INSERT CHEST TUBE  12/9/2021       Home Situation  Home Environment: Private residence  One/Two Story Residence: One story  Living Alone: No  Support Systems: Spouse/Significant Other  [facility] [place]  Current DME Used/Available at Home: None    EXAMINATION/PRESENTATION/DECISION MAKING:   Critical Behavior:  Neurologic State: Alert  Orientation Level: Oriented X4  Cognition: Follows commands     Hearing:   Auditory  Auditory Impairment: None  Range Of Motion:  AROM: Generally decreased, functional                       Strength:    Strength: Generally decreased, functional                    Tone & Sensation:   Tone: Normal Functional Mobility:  Bed Mobility:  Rolling: Independent  Supine to Sit: Independent  Sit to Supine: Independent  Scooting: Independent  Transfers:  Sit to Stand: Modified independent  Stand to Sit: Modified independent                       Balance:   Sitting: Intact  Standing: Intact  Ambulation/Gait Training:  Distance (ft): 55 Feet (ft)  Assistive Device: Other (comment) (HHa)  Ambulation - Level of Assistance: Supervision;Modified independent     Gait Description (WDL): Exceptions to WDL                 Speed/Varsha: Slow  Step Length: Right shortened;Left shortened              Functional Measure:  [address] [place] [address] AM-PAC 6 Clicks         Basic Mobility Inpatient Short Form  How much difficulty does the patient currently have. .. Unable A Lot A Little None   1. Turning over in bed (including adjusting bedclothes, sheets and blankets)? [] 1   [] 2   [] 3   [x] 4   2. Sitting down on and standing up from a chair with arms ( e.g., wheelchair, bedside commode, etc.)   [] 1   [] 2   [] 3   [x] 4   3. Moving from lying on back to sitting on the side of the bed? [] 1   [] 2   [] 3   [x] 4          How much help from another person does the patient currently need. .. Total A Lot A Little None   4. Moving to and from a bed to a chair (including a wheelchair)? [] 1   [] 2   [] 3   [x] 4   5. Need to walk in hospital room? [] 1   [] 2   [] 3   [x] 4   6. Climbing 3-5 steps with a railing? [] 1   [] 2   [] 3   [x] 4   © 2007, Trustees of [address] [place] [address], under license to [employer]. All rights reserved     Score:  Initial: 24/24 Most Recent: X (Date: 12/27/2021 )   Interpretation of Tool:  Represents activities that are increasingly more difficult (i.e. Bed mobility, Transfers, Gait).   Score 24 23 22-20 19-15 14-10 9-7 6   Modifier CH CI CJ CK CL CM CN         Physical Therapy Evaluation Charge Determination   History Examination Presentation Decision-Making   LOW Complexity : Zero comorbidities / personal factors that will impact the outcome / POC LOW Complexity : 1-2 Standardized tests and measures addressing body structure, function, activity limitation and / or participation in recreation  LOW Complexity : Stable, uncomplicated  Other outcome measures [facility] 6        Based on the above components, the patient evaluation is determined to be of the following complexity level: LOW     Pain Ratin/10 at the chest tube insertion  Activity Tolerance:   Good    After treatment patient left in no apparent distress:   Sitting in chair and Call bell within reach . GOALS:  No goals    COMMUNICATION/EDUCATION:   The patients plan of care was discussed with: Occupational therapist and Registered nurse. Patient understands intent and goals of therapy, but is neutral about his/her participation. Thank you for this referral.  Kendy Pritchard PT.    Time Calculation: (P) 20 mins

## 2021-12-27 NOTE — PROGRESS NOTES
Doing well today afebrile, slight cough, CXR stable, wounds OK, Lungs clear. I have removed one chest tube. Still has tiny intermittent air leak and about 100 cc fluid drainage a day.   Will see me in the office in 2 days

## 2021-12-27 NOTE — PROGRESS NOTES
Writer received a message from primary nurse that [facility] is no longer needed. [address] informed. Patient will be discharged to home self care. Discharge plan of care/case management plan validated with provider discharge order.     Dayne Cruz

## 2021-12-29 ENCOUNTER — OFFICE VISIT (OUTPATIENT)
Dept: SURGERY | Age: 51
End: 2021-12-29
Payer: COMMERCIAL

## 2021-12-29 ENCOUNTER — TELEPHONE (OUTPATIENT)
Dept: SURGERY | Age: 51
End: 2021-12-29

## 2021-12-29 ENCOUNTER — HOSPITAL ENCOUNTER (OUTPATIENT)
Dept: NON INVASIVE DIAGNOSTICS | Age: 51
Discharge: HOME | End: 2021-12-29
Attending: THORACIC SURGERY (CARDIOTHORACIC VASCULAR SURGERY)
Payer: COMMERCIAL

## 2021-12-29 VITALS
WEIGHT: 162 LBS | RESPIRATION RATE: 20 BRPM | OXYGEN SATURATION: 95 % | DIASTOLIC BLOOD PRESSURE: 56 MMHG | TEMPERATURE: 97.1 F | HEIGHT: 71 IN | BODY MASS INDEX: 22.68 KG/M2 | SYSTOLIC BLOOD PRESSURE: 112 MMHG | HEART RATE: 97 BPM

## 2021-12-29 DIAGNOSIS — M79.662 PAIN AND SWELLING OF LEFT LOWER LEG: Primary | ICD-10-CM

## 2021-12-29 DIAGNOSIS — M79.89 PAIN AND SWELLING OF LEFT LOWER LEG: Primary | ICD-10-CM

## 2021-12-29 DIAGNOSIS — M79.662 PAIN AND SWELLING OF LEFT LOWER LEG: ICD-10-CM

## 2021-12-29 DIAGNOSIS — M79.89 PAIN AND SWELLING OF LEFT LOWER LEG: ICD-10-CM

## 2021-12-29 PROCEDURE — 93970 EXTREMITY STUDY: CPT

## 2021-12-29 PROCEDURE — 99024 POSTOP FOLLOW-UP VISIT: CPT | Performed by: THORACIC SURGERY (CARDIOTHORACIC VASCULAR SURGERY)

## 2021-12-29 NOTE — TELEPHONE ENCOUNTER
Pt came in for office visit. He stated he did not get his antibiotic because it was too expensive to get. I ask him what the cost was-he stated, almost 300.00 dollars with insurance. I told him I will let Dr. Brenda Wheeler know this. I then called [facility] pharmacy, I spoke with pharmacist. He stated pt, or someone for him, picked up his medication. They picked up 20 tablets of percocet and did not  the antibiotic. The pharmacist stated the antibiotic was almost 300.00 before insurance, after insurance it is 90 cents. I informed Dr. Brenda Wheeler. I informed the pt. Mr. Eloy Chavez says when he got the bottle from whomever picked it up for him there was 2 tablets in the bottle.

## 2021-12-30 NOTE — PROGRESS NOTES
Post-OP Visit    Guillermo Juarez is a 46 y.o. male who presents to the office today for: follow up after decortication. I think he is intoxicated or narcotized at this visit. In the course of 10 min he give multiple different stories. But bottom line is that he never got his antibiotics at the pharmacy after discharge because he said they were $300 which is in correct we called the pharmacy. He also says a friend picked up the pills, a daughter picked up the pills, byherself,, she picked up the pills and he was in the case. That he was only given 2 percocett, then he does not know how many he was given. So we have told him to go back to the pharmacy and  the antibiotics (I doubt he will do this)     He has no medical complaints related to his lngs except that he woke up this am and both legs were swollen left greater than right, and I have looked at them and that is the case,  He says he has not had this before, and he had no pain in the legs and did nto eat a lot of salt last night,           BP (!) 112/56 (BP 1 Location: Right upper arm, BP Patient Position: Sitting, BP Cuff Size: Adult)   Pulse 97   Temp 97.1 °F (36.2 °C) (Temporal)   Resp 20   Ht 5' 11\" (1.803 m)   Wt 162 lb (73.5 kg)   SpO2 95%   BMI 22.59 kg/m²     Physical Exam   Lungs clear, Heart RRR and no murmur,   no pain in legs on exam or holemans sign, but bilateral leg swelling left worse than right (moderate in size from mid calf down with some pitting edema at ankle.    Possible trivial air leak on chest tube and only 50 cc out over night       Problem List Items Addressed This Visit     None      Visit Diagnoses     Pain and swelling of left lower leg    -  Primary    Relevant Orders    DUPLEX LOWER EXT VENOUS BILAT (Completed)          Assessment and Plan:   Sp decortication, supposed to be on additional 2 weeks of ABX, hopefully he will get them and do that. I have sent him for bilateral DVT scan and both legs are negative for DVT! Which I though he would have had but that's great he did not. I dont have a reason for hte leg swellign at this time, will see how it goes. But I am concerned he is drinking again. Next visit in one week with CXR and hopefully get chest tube out at that time .        Francois Guan MD

## 2022-01-05 ENCOUNTER — HOSPITAL ENCOUNTER (OUTPATIENT)
Dept: GENERAL RADIOLOGY | Age: 52
Discharge: HOME | End: 2022-01-05
Payer: COMMERCIAL

## 2022-01-05 ENCOUNTER — OFFICE VISIT (OUTPATIENT)
Dept: SURGERY | Age: 52
End: 2022-01-05
Payer: COMMERCIAL

## 2022-01-05 VITALS
TEMPERATURE: 97.7 F | BODY MASS INDEX: 20.66 KG/M2 | WEIGHT: 147.6 LBS | OXYGEN SATURATION: 98 % | DIASTOLIC BLOOD PRESSURE: 73 MMHG | RESPIRATION RATE: 20 BRPM | SYSTOLIC BLOOD PRESSURE: 140 MMHG | HEIGHT: 71 IN | HEART RATE: 90 BPM

## 2022-01-05 DIAGNOSIS — J90 PLEURAL EFFUSION: Primary | ICD-10-CM

## 2022-01-05 DIAGNOSIS — J90 PLEURAL EFFUSION: ICD-10-CM

## 2022-01-05 PROCEDURE — 99024 POSTOP FOLLOW-UP VISIT: CPT | Performed by: THORACIC SURGERY (CARDIOTHORACIC VASCULAR SURGERY)

## 2022-01-05 PROCEDURE — 71046 X-RAY EXAM CHEST 2 VIEWS: CPT

## 2022-01-05 RX ORDER — OXYCODONE AND ACETAMINOPHEN 5; 325 MG/1; MG/1
1 TABLET ORAL
Qty: 28 TABLET | Refills: 0 | Status: SHIPPED | OUTPATIENT
Start: 2022-01-05 | End: 2022-01-12

## 2022-01-05 NOTE — PROGRESS NOTES
Visit Vitals  BP (!) 140/73 (BP 1 Location: Right upper arm, BP Patient Position: Sitting, BP Cuff Size: Adult)   Pulse 90   Temp 97.7 °F (36.5 °C) (Temporal)   Resp 20   Ht 5' 11\" (1.803 m)   Wt 147 lb 9.6 oz (67 kg)   SpO2 98%   BMI 20.59 kg/m²     Chief Complaint   Patient presents with    Follow-up     f/u with chest xray

## 2022-01-05 NOTE — PROGRESS NOTES
Post-OP Visit    Shubham Worthington is a 46 y.o. male who presents to the office today for followup after decortication, no complaints, got his antibiotics filled last week, . No air leak on examn that I can see       BP (!) 140/73 (BP 1 Location: Right upper arm, BP Patient Position: Sitting, BP Cuff Size: Adult)   Pulse 90   Temp 97.7 °F (36.5 °C) (Temporal)   Resp 20   Ht 5' 11\" (1.803 m)   Wt 147 lb 9.6 oz (67 kg)   SpO2 98%   BMI 20.59 kg/m²     Physical Exam lungs clear, wounds OK     Problem List Items Addressed This Visit     None          Assessment and Plan:  Will clamp tube tomarrow (unclamp if gets any shortness of breath) get CXR at noon and see me at 1pm for chest tube removal     John Vega MD

## 2022-01-06 ENCOUNTER — OFFICE VISIT (OUTPATIENT)
Dept: SURGERY | Age: 52
End: 2022-01-06
Payer: COMMERCIAL

## 2022-01-06 ENCOUNTER — HOSPITAL ENCOUNTER (OUTPATIENT)
Dept: GENERAL RADIOLOGY | Age: 52
Discharge: HOME | End: 2022-01-06
Payer: COMMERCIAL

## 2022-01-06 VITALS
DIASTOLIC BLOOD PRESSURE: 82 MMHG | BODY MASS INDEX: 20.58 KG/M2 | TEMPERATURE: 97.7 F | SYSTOLIC BLOOD PRESSURE: 138 MMHG | RESPIRATION RATE: 20 BRPM | OXYGEN SATURATION: 98 % | HEART RATE: 105 BPM | HEIGHT: 71 IN | WEIGHT: 147 LBS

## 2022-01-06 DIAGNOSIS — J90 PLEURAL EFFUSION: Primary | ICD-10-CM

## 2022-01-06 DIAGNOSIS — J90 PLEURAL EFFUSION: ICD-10-CM

## 2022-01-06 PROCEDURE — 71046 X-RAY EXAM CHEST 2 VIEWS: CPT

## 2022-01-06 PROCEDURE — 99024 POSTOP FOLLOW-UP VISIT: CPT | Performed by: THORACIC SURGERY (CARDIOTHORACIC VASCULAR SURGERY)

## 2022-01-06 NOTE — PROGRESS NOTES
Visit Vitals  /82 (BP 1 Location: Right upper arm, BP Patient Position: Sitting, BP Cuff Size: Adult)   Pulse (!) 105   Temp 97.7 °F (36.5 °C) (Temporal)   Resp 20   Ht 5' 11\" (1.803 m)   Wt 147 lb (66.7 kg)   SpO2 98%   BMI 20.50 kg/m²     Chief Complaint   Patient presents with    Follow-up     follow up with chest xray   1. Have you been to the ER, urgent care clinic since your last visit? Hospitalized since your last visit? No    2. Have you seen or consulted any other health care providers outside of the [address] [place] [address] since your last visit? Include any pap smears or colon screening.  No

## 2022-01-11 DIAGNOSIS — J86.9 EMPYEMA OF LUNG (HCC): ICD-10-CM

## 2022-01-11 DIAGNOSIS — J90 PLEURAL EFFUSION: Primary | ICD-10-CM

## 2022-01-12 ENCOUNTER — OFFICE VISIT (OUTPATIENT)
Dept: SURGERY | Age: 52
End: 2022-01-12
Payer: COMMERCIAL

## 2022-01-12 ENCOUNTER — HOSPITAL ENCOUNTER (OUTPATIENT)
Dept: GENERAL RADIOLOGY | Age: 52
Discharge: HOME | End: 2022-01-12
Payer: COMMERCIAL

## 2022-01-12 VITALS
RESPIRATION RATE: 20 BRPM | BODY MASS INDEX: 20.86 KG/M2 | HEART RATE: 90 BPM | WEIGHT: 149 LBS | DIASTOLIC BLOOD PRESSURE: 81 MMHG | HEIGHT: 71 IN | TEMPERATURE: 97.3 F | SYSTOLIC BLOOD PRESSURE: 130 MMHG | OXYGEN SATURATION: 98 %

## 2022-01-12 DIAGNOSIS — J90 PLEURAL EFFUSION: ICD-10-CM

## 2022-01-12 DIAGNOSIS — J38.1 VOCAL CORD POLYP: Primary | ICD-10-CM

## 2022-01-12 DIAGNOSIS — J86.9 EMPYEMA OF LUNG (HCC): ICD-10-CM

## 2022-01-12 PROCEDURE — 99024 POSTOP FOLLOW-UP VISIT: CPT | Performed by: THORACIC SURGERY (CARDIOTHORACIC VASCULAR SURGERY)

## 2022-01-12 PROCEDURE — 71046 X-RAY EXAM CHEST 2 VIEWS: CPT

## 2022-01-12 NOTE — PROGRESS NOTES
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 Dr. Sindi Santa office in [facility]. Pt opted to make appointment himself. He is worried about returning to work. He says he doesn't have a money coming in and he can't go to another doctor right now. Pt was given Referral order. Pt was instructed who to call for his appointment and that it was for vocal cord polyp. Pt verbalized understanding of this information. He will call office if he needs help making the appointment.

## 2022-01-12 NOTE — PROGRESS NOTES
Post-OP Visit    Sammy Francisco is a 46 y.o. male who presents to the office today for: followup after decortication, no complaints except decreasing pain       /81 (BP 1 Location: Right upper arm, BP Patient Position: Sitting, BP Cuff Size: Adult)   Pulse 90   Temp 97.3 °F (36.3 °C) (Temporal)   Resp 20   Ht 5' 11\" (1.803 m)   Wt 149 lb (67.6 kg)   SpO2 98%   BMI 20.78 kg/m²     Physical Exam  Lungs clear, wounds OK     Problem List Items Addressed This Visit     None      Visit Diagnoses     Vocal cord polyp    -  Primary    Relevant Orders    REFERRAL TO ENT-OTOLARYNGOLOGY          Assessment and Plan: sp decortication, doing well. Will see in two weeks with CXR to follow small amount of fluuid left, also refer to ENT re vocal cord polyp that I sa upon intubation at last operation.      Kaylyn Ricardo MD

## 2022-01-13 ENCOUNTER — TELEPHONE (OUTPATIENT)
Dept: ENT CLINIC | Age: 52
End: 2022-01-13

## 2022-01-17 LAB
BACTERIA SPEC CULT: NORMAL
SPECIAL REQUESTS,SREQ: NORMAL

## 2022-01-24 LAB
BACTERIA SPEC CULT: NORMAL
SPECIAL REQUESTS,SREQ: NORMAL

## 2022-01-25 DIAGNOSIS — J86.9 EMPYEMA OF LUNG (HCC): Primary | ICD-10-CM

## 2022-01-25 DIAGNOSIS — J90 PLEURAL EFFUSION: ICD-10-CM

## 2022-01-26 ENCOUNTER — HOSPITAL ENCOUNTER (OUTPATIENT)
Dept: GENERAL RADIOLOGY | Age: 52
Discharge: HOME | End: 2022-01-26
Payer: COMMERCIAL

## 2022-01-26 ENCOUNTER — OFFICE VISIT (OUTPATIENT)
Dept: SURGERY | Age: 52
End: 2022-01-26
Payer: COMMERCIAL

## 2022-01-26 VITALS
BODY MASS INDEX: 22.37 KG/M2 | TEMPERATURE: 97.3 F | OXYGEN SATURATION: 98 % | HEART RATE: 89 BPM | RESPIRATION RATE: 20 BRPM | DIASTOLIC BLOOD PRESSURE: 87 MMHG | WEIGHT: 159.8 LBS | HEIGHT: 71 IN | SYSTOLIC BLOOD PRESSURE: 130 MMHG

## 2022-01-26 DIAGNOSIS — J90 PLEURAL EFFUSION: ICD-10-CM

## 2022-01-26 DIAGNOSIS — J90 PLEURAL EFFUSION: Primary | ICD-10-CM

## 2022-01-26 DIAGNOSIS — J86.9 EMPYEMA OF LUNG (HCC): ICD-10-CM

## 2022-01-26 PROCEDURE — 71046 X-RAY EXAM CHEST 2 VIEWS: CPT

## 2022-01-26 PROCEDURE — 99024 POSTOP FOLLOW-UP VISIT: CPT | Performed by: THORACIC SURGERY (CARDIOTHORACIC VASCULAR SURGERY)

## 2022-01-26 RX ORDER — IBUPROFEN 800 MG/1
800 TABLET ORAL
COMMUNITY

## 2022-01-26 NOTE — PROGRESS NOTES
Visit Vitals  /87 (BP 1 Location: Right upper arm, BP Patient Position: Sitting, BP Cuff Size: Large adult)   Pulse 89   Temp 97.3 °F (36.3 °C) (Temporal)   Resp 20   Ht 5' 11\" (1.803 m)   Wt 159 lb 12.8 oz (72.5 kg)   SpO2 98%   BMI 22.29 kg/m²     Chief Complaint   Patient presents with    Follow-up     follow up with chest xray, still c/o numbness on right side

## 2022-01-26 NOTE — LETTER
NOTIFICATION RETURN TO WORK / SCHOOL    1/26/2022 11:37 AM    Mr. Tamika Moses  [address] [place] [phone]      To Whom It May Concern:    Tamika Moses is currently under the care of [address] [place] [address]. He will return to work/school on: 02/14/2022. If there are questions or concerns please have the patient contact our office.         Sincerely,      Heyward Goodell, MD

## 2022-01-27 ENCOUNTER — TELEPHONE (OUTPATIENT)
Dept: ENT CLINIC | Age: 52
End: 2022-01-27

## 2022-01-27 LAB
GRAM STN SPEC: NORMAL
SPECIAL REQUESTS,SREQ: NORMAL

## 2022-01-27 NOTE — PROGRESS NOTES
Post-OP Visit    Lynne Can is a 46 y.o. male who presents to the office today for:followuup after decortication, only complaints of pain and l(he is out a month) and he is resisiting going back to work      /87 (BP 1 Location: Right upper arm, BP Patient Position: Sitting, BP Cuff Size: Large adult)   Pulse 89   Temp 97.3 °F (36.3 °C) (Temporal)   Resp 20   Ht 5' 11\" (1.803 m)   Wt 159 lb 12.8 oz (72.5 kg)   SpO2 98%   BMI 22.29 kg/m²     Physical Exam  Wounds OK, lungs clear    Problem List Items Addressed This Visit     None          Assessment and Plan: SP decortication, medically he is finished,  still complaints of so much pain from his thoracotomy he does not think he can go back to work in 2 weeks. I am going to see him in the office and have extended his off work time to a total of 7 weeks.  This is not chest wall pain syndrome, No more narcotics at this time just NSAIDS    Marisol Marie MD

## 2022-01-27 NOTE — TELEPHONE ENCOUNTER
Tried calling Carmine Angulo  to confirm next appointment, pt was not available to come to the phone. His mother answered and I just asked her to have the pt call our office.

## 2022-01-28 ENCOUNTER — OFFICE VISIT (OUTPATIENT)
Dept: ENT CLINIC | Age: 52
End: 2022-01-28
Payer: COMMERCIAL

## 2022-01-28 VITALS
TEMPERATURE: 98.7 F | OXYGEN SATURATION: 99 % | WEIGHT: 162 LBS | BODY MASS INDEX: 22.68 KG/M2 | HEART RATE: 75 BPM | HEIGHT: 71 IN | DIASTOLIC BLOOD PRESSURE: 70 MMHG | SYSTOLIC BLOOD PRESSURE: 122 MMHG | RESPIRATION RATE: 16 BRPM

## 2022-01-28 DIAGNOSIS — R49.0 HOARSENESS: Primary | ICD-10-CM

## 2022-01-28 PROCEDURE — 99203 OFFICE O/P NEW LOW 30 MIN: CPT | Performed by: SPECIALIST

## 2022-01-28 PROCEDURE — 31575 DIAGNOSTIC LARYNGOSCOPY: CPT | Performed by: SPECIALIST

## 2022-01-28 NOTE — PROGRESS NOTES
Visit Vitals  /70 (BP 1 Location: Right upper arm, BP Patient Position: Sitting, BP Cuff Size: Large adult)   Pulse 75   Temp 98.7 °F (37.1 °C) (Temporal)   Resp 16   Ht 5' 11\" (1.803 m)   Wt 162 lb (73.5 kg)   SpO2 99%   BMI 22.59 kg/m²     Chief Complaint   Patient presents with    New Patient     tThroat

## 2022-01-28 NOTE — PROGRESS NOTES
Subjective:        Haley Jenkins   46 y.o.   1970     New Patient Visit  49-year-old man referred for evaluation of hoarseness. The patient had a right pleural effusion that was treated with first thoracentesis and then thoracotomy with empyemectomy resulting in a pneumothorax requiring a long-term chest tube that was just recently removed. Patient evidently was much more hoarse when he was initially sick. The patient states that someone mentioned a possible vocal polyp and this was why he is seen today. He is feeling much better now and appears to be breathing well. The patient has a 30-year history of smoking. Review of Systems  ROS         Heent: No diplopia, no hearing loss, no tinnitis, no nasal congestion, no sinus pain, no dysphygia, no sore throat. Neck:  No neck mass, no neck pain  Respiratory:  No cough, no hemoptysis, no SOB, no wheezing  CV:  No chest pain, no arrythmias, no syncope  GI:  No nausea, no vomiting, no abdominal pain  Neuro:  No headache, no loss of consciousness, no paralysis, no weakness      Physical Exam    General: NAD, well-developed well-nourished, voice slightly raspy  Eyes: PERRLA, EOMs intact  Ears: External canals clear, TMs:  Clear, Tuning fork exam normal  Septum midline, turbinates normal, mucosa normal, no external deformity  Mouth: Mucosa normal, tongue normal, floor of mouth normal  Throat: Clear, tonsils absent  Neck: Supple without masses, no bruits     Neuro: Cranial nerves II through XII grossly intact    Fiberoptic laryngoscopy: After proper consent and under topical anesthesia the flexible laryngoscope was passed into the nose. The nasopharynx and hypopharynx are fairly clear with a small 3 to 4 mm mucocele seen in the left vallecula. Vocal cords are moving well with no evidence of any lesions. The subglottis appears to be clear. Patient tolerated procedure well.      Past Medical History:   Diagnosis Date    Asthma     Hypertension     Ill-defined condition      Past Surgical History:   Procedure Laterality Date    IR THORACENTESIS/INSERT CHEST TUBE  12/9/2021      Family History   Problem Relation Age of Onset    Hypertension Mother     Hypertension Father      Social History     Tobacco Use    Smoking status: Former Smoker     Packs/day: 1.00     Years: 30.00     Pack years: 30.00    Smokeless tobacco: Never Used   Substance Use Topics    Alcohol use: Yes     Comment: daily drinker       Prior to Admission medications    Medication Sig Start Date End Date Taking? Authorizing Provider   albuterol (PROVENTIL HFA, VENTOLIN HFA, PROAIR HFA) 90 mcg/actuation inhaler Take 2 Puffs by inhalation every six (6) hours as needed for Wheezing or Shortness of Breath. 12/27/21  Yes Luc Brian PA-C   budesonide-formoteroL (SYMBICORT) 160-4.5 mcg/actuation HFAA Take 2 Puffs by inhalation two (2) times daily as needed (shortness breath). 12/27/21  Yes Luc Brian PA-C   amLODIPine (NORVASC) 5 mg tablet Take 5 mg by mouth daily. 11/26/21  Yes Provider, Historical   ibuprofen (MOTRIN) 800 mg tablet Take 800 mg by mouth every six (6) hours as needed for Pain. Patient not taking: Reported on 1/28/2022    Provider, Historical                    Objective:     Visit Vitals  /70 (BP 1 Location: Right upper arm, BP Patient Position: Sitting, BP Cuff Size: Large adult)   Pulse 75   Temp 98.7 °F (37.1 °C) (Temporal)   Resp 16   Ht 5' 11\" (1.803 m)   Wt 162 lb (73.5 kg)   SpO2 99%   BMI 22.59 kg/m²        No Known Allergies      Assessment/Plan:   Mild hoarseness with essentially normal examination of the head neck and normal laryngoscopy: Follow-up as needed  Encounter Diagnoses   Name Primary?  Hoarseness Yes     No orders of the defined types were placed in this encounter. Follow-up and Dispositions    · Return if symptoms worsen or fail to improve. Yenifer Peter MD, [address] [facility]    [address]  [place], [facility] [phone]

## 2022-01-31 LAB
ACID FAST STN SPEC: NEGATIVE
MYCOBACTERIUM SPEC QL CULT: NEGATIVE
SPECIMEN PREPARATION: NORMAL
SPECIMEN SOURCE: NORMAL

## 2022-02-09 ENCOUNTER — HOSPITAL ENCOUNTER (OUTPATIENT)
Dept: GENERAL RADIOLOGY | Age: 52
Discharge: HOME | End: 2022-02-09
Payer: COMMERCIAL

## 2022-02-09 ENCOUNTER — TRANSCRIBE ORDER (OUTPATIENT)
Dept: REGISTRATION | Age: 52
End: 2022-02-09

## 2022-02-09 ENCOUNTER — OFFICE VISIT (OUTPATIENT)
Dept: SURGERY | Age: 52
End: 2022-02-09
Payer: COMMERCIAL

## 2022-02-09 VITALS
HEIGHT: 71 IN | BODY MASS INDEX: 23.21 KG/M2 | SYSTOLIC BLOOD PRESSURE: 138 MMHG | DIASTOLIC BLOOD PRESSURE: 80 MMHG | HEART RATE: 83 BPM | TEMPERATURE: 97.8 F | OXYGEN SATURATION: 98 % | WEIGHT: 165.8 LBS

## 2022-02-09 DIAGNOSIS — J90 EXUDATIVE PLEURISY: Primary | ICD-10-CM

## 2022-02-09 DIAGNOSIS — J90 PLEURAL EFFUSION: Primary | ICD-10-CM

## 2022-02-09 DIAGNOSIS — J90 EXUDATIVE PLEURISY: ICD-10-CM

## 2022-02-09 PROCEDURE — 71046 X-RAY EXAM CHEST 2 VIEWS: CPT

## 2022-02-09 PROCEDURE — 99024 POSTOP FOLLOW-UP VISIT: CPT | Performed by: THORACIC SURGERY (CARDIOTHORACIC VASCULAR SURGERY)

## 2022-02-09 NOTE — PROGRESS NOTES
Chief Complaint   Patient presents with    Follow-up     post op f/u     Visit Vitals  /80 (BP 1 Location: Right arm, BP Patient Position: Sitting, BP Cuff Size: Adult)   Pulse 83   Temp 97.8 °F (36.6 °C) (Temporal)   Ht 5' 11\" (1.803 m)   Wt 165 lb 12.8 oz (75.2 kg)   SpO2 98%   BMI 23.12 kg/m²     1. Have you been to the ER, urgent care clinic since your last visit? Hospitalized since your last visit? No  2. Have you seen or consulted any other health care providers outside of the [address] [place] [address] since your last visit? Include any pap smears or colon screening.  No

## 2022-02-10 NOTE — PROGRESS NOTES
Post-OP Visit    Sheryl Miller is a 46 y.o. male who presents to the office today for on going followup after decortication, he has no compalints and is doing well       /80 (BP 1 Location: Right arm, BP Patient Position: Sitting, BP Cuff Size: Adult)   Pulse 83   Temp 97.8 °F (36.6 °C) (Temporal)   Ht 5' 11\" (1.803 m)   Wt 165 lb 12.8 oz (75.2 kg)   SpO2 98%   BMI 23.12 kg/m²     Physical Exam  Lungs clear, woudns OK , CXR reviewed and looks good     Problem List Items Addressed This Visit        Respiratory    Pleural effusion - Primary          Assessment and Plan: sp decortication, cxr looks good, will discharge from the clinic, no further visits required.   Patient going back to work next week     Terra Montana MD

## 2022-03-18 PROBLEM — J90 PLEURAL EFFUSION: Status: ACTIVE | Noted: 2021-12-08

## 2022-03-18 PROBLEM — Z96.89 CHEST TUBE IN PLACE: Status: ACTIVE | Noted: 2021-12-27

## 2022-03-18 PROBLEM — F10.10 ALCOHOL ABUSE: Status: ACTIVE | Noted: 2021-12-27

## 2022-03-19 PROBLEM — J44.1 COPD WITH ACUTE EXACERBATION (HCC): Status: ACTIVE | Noted: 2021-12-27

## 2022-03-19 PROBLEM — J86.9 EMPYEMA OF LUNG (HCC): Status: ACTIVE | Noted: 2021-12-27

## 2022-03-19 PROBLEM — Z98.890 S/P THORACOTOMY: Status: ACTIVE | Noted: 2021-12-27

## 2022-03-24 NOTE — PROGRESS NOTES
Post-OP Visit    Sheryl Miller is a 45 y.o. male who presents to the office today for his slip to get his cxr, it was not an office visit , I did  not see him       /82 (BP 1 Location: Right upper arm, BP Patient Position: Sitting, BP Cuff Size: Adult)   Pulse (!) 105   Temp 97.7 °F (36.5 °C) (Temporal)   Resp 20   Ht 5' 11\" (1.803 m)   Wt 147 lb (66.7 kg)   SpO2 98%   BMI 20.50 kg/m²     Physical Exam none    Problem List Items Addressed This Visit     None          Assessment and Plan sp decortication doing well     Terra Montana MD

## 2022-04-16 NOTE — PROGRESS NOTES
Encounter Date: 4/16/2022       History     Chief Complaint   Patient presents with    Laceration     R digit 4. Cut on a can of dog food the other day. Draining pus now     This patient is a 43-year-old male with past history of diabetes mellitus, hypertension, morbid obesity presenting with complaints of some increasing drainage in mild pain that has been creasing around a laceration on his right 4th digit.  He reports initially cutting this area while opening a can of dog food approximately 5 days ago.  He reports cleaning a well initially and that he has been putting antibiotic cream on it and keeping it covered.  He reports his blood sugars have been well maintained and that initially it appeared to be healing well but within last 24 hours has started to admit a small amount of purulent material.  He localizes the lesion to the area just proximal to the nail bed on the dorsum of the finger.  He denies that the finger is frankly red, circumferentially swollen or that the lesion is affecting his ability to extend or flex it.  He denies general symptoms including fever.        Review of patient's allergies indicates:  No Known Allergies  Past Medical History:   Diagnosis Date    Diabetes mellitus     Hypertension     Morbid obesity      Past Surgical History:   Procedure Laterality Date    CHOLECYSTECTOMY       No family history on file.  Social History     Tobacco Use    Smoking status: Never Smoker   Substance Use Topics    Alcohol use: Yes     Comment: occasionally    Drug use: No     Review of Systems   Constitutional: Negative for fever.   Musculoskeletal: Negative for arthralgias.   Skin: Positive for wound.   Hematological: Negative for adenopathy.       Physical Exam     Initial Vitals [04/16/22 0529]   BP Pulse Resp Temp SpO2   (!) 156/90 90 16 98 °F (36.7 °C) 98 %      MAP       --         Physical Exam    Nursing note and vitals reviewed.  Constitutional: He appears well-nourished. No distress.  Tele Nocurnist Hospitalist Note  -Decortication this morning for complex empyema. Contacted by RN, follow up chest XR show air under the diaphragm. Dr. Lucy Eduardo contacted and he is aware of the finding which he stated is expected post operative finding.   HENT:   Head: Normocephalic and atraumatic.   Eyes: Conjunctivae and EOM are normal.   Cardiovascular: Normal rate.   Pulmonary/Chest: No respiratory distress.   Musculoskeletal:         General: Normal range of motion.     Neurological: He is alert and oriented to person, place, and time.   Skin:   Small ulceration over the PIP, dorsal surface right 4th middle finger mild adjacent swelling without blake purulent drainage, no pain out of proportion, no red streaking, swelling is not fusiform, not held in passive flexion, no restriction in extension or active flexion   Psychiatric: He has a normal mood and affect.         ED Course   Procedures  Labs Reviewed - No data to display       Imaging Results    None          Medications   doxycycline tablet 100 mg (100 mg Oral Given 4/16/22 0546)     Medical Decision Making:   ED Management:  Patient emergently assessed.  Initial vital signs are stable.  The wound appears to be slow healing but does not look frankly infected.  No current evidence of flexor tenosynovitis, septic arthritis of the finger joint, yvette cellulitis, drainable abscess/paronychia, sepsis.  Considering his history of diabetes mellitus I will initiate him on doxycycline therapy and he is provided his 1st dose here.  He is further educated about supportive care and is asked to have a wound check with his doctor within the next 48-72 hours.  He is asked return to the emergency department immediately for any new, concerning, or worsening symptoms including worsening pain, swelling, fever, drainage.  Patient was agreeable with this plan he was discharged in stable condition.                      Clinical Impression:   Final diagnoses:  [S61.214A] Laceration of right ring finger without foreign body without damage to nail, initial encounter (Primary)          ED Disposition Condition    Discharge Stable        ED Prescriptions     Medication Sig Dispense Start Date End Date Auth. Provider    doxycycline  (VIBRAMYCIN) 100 MG Cap Take 1 capsule (100 mg total) by mouth 2 (two) times daily. for 7 days 14 capsule 4/16/2022 4/23/2022 Mauricio Welch MD        Follow-up Information     Follow up With Specialties Details Why Contact Info    Sandy Lira, [facility] Schedule an appointment as soon as possible for a visit   [address] [facility] [place] [address]  [phone]             Mauricio Welch MD  04/16/22 2806

## 2022-12-02 NOTE — PROGRESS NOTES
Post-OP Visit    Madonna Cheng is a 46 y.o. male who presents with empyema and is sp decorticaion today, has no complaints      /80 (BP 1 Location: Right upper arm, BP Patient Position: At rest)   Pulse 91   Temp 99.2 °F (37.3 °C)   Resp 18   Ht 5' 11\" (1.803 m)   Wt 164 lb 3.9 oz (74.5 kg)   SpO2 100%   BMI 22.91 kg/m²     Physical Exam wounds OK, ches ttube sites OK     Problem List Items Addressed This Visit        Respiratory    Pleural effusion      Other Visit Diagnoses     Community acquired pneumonia of right lung, unspecified part of lung    -  Primary          Assessment and Plan: POD number 1 sp decortication and empyema. Doing great, pain under control, no air leaks and decreased drainage,. Two things of note, I entered his abdomen during the case, I specifically wrote in the indications for his post cxr at 11 am yesterday that I had entered his abdomen and that he would have free air on the film and it was not a perforated viscous and I did not need to be called about it. I also told the RR nurse that he would have free air on the film which he did that I saw at that time and that if radiology called about it it was of no concern. Despite me writing that and telling people that the report that was generated hours later made no mention of that! Last night floor nursing called the hospitlalist service at 6pm (not me) and other films were ordered (not by me, that were not needed) and then called me about the free air. I just got called about it again this mornign from radiology with erecomendation ot remove a chest tube? As the empyema was better? ?? and explained about the \"free air\" that it is not a problem, that he will have a pneumo for a month and that I just operated on him yesterday for empyema and his tube will be in for some time and don't; come out the day ofter surgery! So to put this to rest please, I entered his abcomen during the case to get the peal out, I expected him ot have \"free air on the film\" wrote that in the indications for the film, told and it has all bee ignored. He pollard not have an abdominal issue and no further evaluations of \"free air needs to be done\"   And it will be on ALL of his films for some time or longer       The patient has not been able to get DVD propholaxs since surgery as I was told there are no compression boot machines avaliable )not 22 hours after surgery and he can't get heparin at this time or he will have continued bleeding from chest wall. So hopefully that can get sorted out today. I have spoking to nursing administration on the floor about the need for the compression boot machine.    Trish Cates MD Food & Nutrition Related Knowledge Deficit

## 2023-05-15 RX ORDER — ALBUTEROL SULFATE 90 UG/1
2 AEROSOL, METERED RESPIRATORY (INHALATION) EVERY 6 HOURS PRN
COMMUNITY
Start: 2021-12-27

## 2023-05-15 RX ORDER — AMLODIPINE BESYLATE 5 MG/1
5 TABLET ORAL DAILY
COMMUNITY
Start: 2021-11-26

## 2023-05-15 RX ORDER — IBUPROFEN 800 MG/1
800 TABLET ORAL EVERY 6 HOURS PRN
COMMUNITY

## 2023-06-30 NOTE — PROGRESS NOTES
Patient informed of positive BV, metronidazole prescribed at visit. No questions or concerns   Problem: Falls - Risk of  Goal: *Absence of Falls  Description: Document Megan Zurita Fall Risk and appropriate interventions in the flowsheet. Outcome: Progressing Towards Goal  Note: Fall Risk Interventions:            Medication Interventions: Bed/chair exit alarm, Patient to call before getting OOB, Teach patient to arise slowly       Pt alert and oriented x4, lungs diminished in the bases, +bsx4 last bm 12/8/21, voiding without difficulty, ambulatory, skin intact. Pt oriented to the room, callbell within reach. Pt NPO for possible thoracentensis on 12/9/21. Will continue plan of care.

## 2024-05-20 ENCOUNTER — HOSPITAL ENCOUNTER (EMERGENCY)
Facility: HOSPITAL | Age: 54
Discharge: HOME | End: 2024-05-20
Attending: EMERGENCY MEDICINE

## 2024-05-20 ENCOUNTER — APPOINTMENT (OUTPATIENT)
Facility: HOSPITAL | Age: 54
End: 2024-05-20

## 2024-05-20 VITALS
DIASTOLIC BLOOD PRESSURE: 89 MMHG | TEMPERATURE: 97.2 F | RESPIRATION RATE: 17 BRPM | SYSTOLIC BLOOD PRESSURE: 137 MMHG | HEART RATE: 79 BPM | OXYGEN SATURATION: 100 %

## 2024-05-20 DIAGNOSIS — J44.1 COPD EXACERBATION (HCC): Primary | ICD-10-CM

## 2024-05-20 PROCEDURE — 6370000000 HC RX 637 (ALT 250 FOR IP): Performed by: EMERGENCY MEDICINE

## 2024-05-20 PROCEDURE — 99284 EMERGENCY DEPT VISIT MOD MDM: CPT

## 2024-05-20 PROCEDURE — 93005 ELECTROCARDIOGRAM TRACING: CPT | Performed by: EMERGENCY MEDICINE

## 2024-05-20 PROCEDURE — 71045 X-RAY EXAM CHEST 1 VIEW: CPT

## 2024-05-20 PROCEDURE — 94640 AIRWAY INHALATION TREATMENT: CPT

## 2024-05-20 RX ORDER — IPRATROPIUM BROMIDE AND ALBUTEROL SULFATE 2.5; .5 MG/3ML; MG/3ML
1 SOLUTION RESPIRATORY (INHALATION) EVERY 4 HOURS
Qty: 360 ML | Refills: 0 | Status: SHIPPED | OUTPATIENT
Start: 2024-05-20

## 2024-05-20 RX ORDER — BUDESONIDE AND FORMOTEROL FUMARATE DIHYDRATE 160; 4.5 UG/1; UG/1
2 AEROSOL RESPIRATORY (INHALATION) 2 TIMES DAILY
Qty: 30.6 G | Refills: 2 | Status: SHIPPED | OUTPATIENT
Start: 2024-05-20

## 2024-05-20 RX ORDER — ALBUTEROL SULFATE 90 UG/1
2 AEROSOL, METERED RESPIRATORY (INHALATION) EVERY 6 HOURS PRN
Qty: 18 G | Refills: 3 | Status: SHIPPED | OUTPATIENT
Start: 2024-05-20

## 2024-05-20 RX ORDER — IPRATROPIUM BROMIDE AND ALBUTEROL SULFATE 2.5; .5 MG/3ML; MG/3ML
1 SOLUTION RESPIRATORY (INHALATION)
Status: COMPLETED | OUTPATIENT
Start: 2024-05-20 | End: 2024-05-20

## 2024-05-20 RX ADMIN — IPRATROPIUM BROMIDE AND ALBUTEROL SULFATE 1 DOSE: .5; 2.5 SOLUTION RESPIRATORY (INHALATION) at 11:51

## 2024-05-20 ASSESSMENT — PAIN - FUNCTIONAL ASSESSMENT
PAIN_FUNCTIONAL_ASSESSMENT: 0-10
PAIN_FUNCTIONAL_ASSESSMENT: 0-10

## 2024-05-20 ASSESSMENT — PAIN SCALES - GENERAL
PAINLEVEL_OUTOF10: 0
PAINLEVEL_OUTOF10: 0

## 2024-05-20 NOTE — ED TRIAGE NOTES
PT reports hx of asthma with SOB that began today while at work. PT denies any pain or cold symptoms. Per pt he is running out of his inhalers. PT in NAD.

## 2024-05-20 NOTE — ED PROVIDER NOTES
[facility] EMERGENCY DEPT  EMERGENCY DEPARTMENT ENCOUNTER      Pt Name: Dragan Beth  MRN: [id number]  Birthdate 1970  Date of evaluation: 5/20/2024  Provider: William Melo MD    CHIEF COMPLAINT       Chief Complaint   Patient presents with    Shortness of Breath         HISTORY OF PRESENT ILLNESS   (Location/Symptom, Timing/Onset, Context/Setting, Quality, Duration, Modifying Factors, Severity)  Note limiting factors.   Dragan Beth is a 54 y.o. male who presents to the emergency department cough and wheezing onset at work may have been precipitated by cold exposure.  Underlying COPD past medical history previous pneumonia  with empyema requiring thoracostomy.  Today denies sensation illness no fever or sputum production recent cold or exposure to others illness.    HPI    Nursing Notes were reviewed.    REVIEW OF SYSTEMS    (2-9 systems for level 4, 10 or more for level 5)     Review of Systems   All other systems reviewed and are negative.      Except as noted above the remainder of the review of systems was reviewed and negative.       PAST MEDICAL HISTORY     Past Medical History:   Diagnosis Date    Asthma     Hypertension     Ill-defined condition          SURGICAL HISTORY       Past Surgical History:   Procedure Laterality Date    IR PERC CATH PLEURAL DRAIN W/IMAG  12/9/2021    IR PERC CATH PLEURAL DRAIN W/IMAG 12/9/2021 SSR RAD ANGIO IR    IR PERC CATH PLEURAL DRAIN W/IMAG  12/9/2021         CURRENT MEDICATIONS       Previous Medications    AMLODIPINE (NORVASC) 5 MG TABLET    Take 1 tablet by mouth daily    IBUPROFEN (ADVIL;MOTRIN) 800 MG TABLET    Take 1 tablet by mouth every 6 hours as needed       ALLERGIES     Patient has no known allergies.    FAMILY HISTORY       Family History   Problem Relation Age of Onset    Hypertension Father     Hypertension Mother           SOCIAL HISTORY       Social History     Socioeconomic History    Marital status: Single     Spouse name: None    Number of children:

## 2024-05-22 LAB
EKG ATRIAL RATE: 81 BPM
EKG DIAGNOSIS: NORMAL
EKG P AXIS: 76 DEGREES
EKG P-R INTERVAL: 158 MS
EKG Q-T INTERVAL: 380 MS
EKG QRS DURATION: 100 MS
EKG QTC CALCULATION (BAZETT): 439 MS
EKG R AXIS: 66 DEGREES
EKG T AXIS: 39 DEGREES
EKG VENTRICULAR RATE: 80 BPM

## 2024-07-23 ENCOUNTER — HOSPITAL ENCOUNTER (EMERGENCY)
Facility: HOSPITAL | Age: 54
Discharge: HOME | End: 2024-07-23
Attending: EMERGENCY MEDICINE

## 2024-07-23 VITALS
DIASTOLIC BLOOD PRESSURE: 123 MMHG | HEART RATE: 126 BPM | SYSTOLIC BLOOD PRESSURE: 186 MMHG | WEIGHT: 165.8 LBS | OXYGEN SATURATION: 98 % | TEMPERATURE: 98.2 F | BODY MASS INDEX: 23.12 KG/M2 | RESPIRATION RATE: 17 BRPM
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
## 2024-07-23 DIAGNOSIS — F10.920 ACUTE ALCOHOLIC INTOXICATION WITHOUT COMPLICATION (HCC): ICD-10-CM

## 2024-07-23 PROCEDURE — 99283 EMERGENCY DEPT VISIT LOW MDM: CPT
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
## 2024-08-27 ENCOUNTER — HOSPITAL ENCOUNTER (EMERGENCY)
Facility: HOSPITAL | Age: 54
Discharge: HOME | End: 2024-08-27
Attending: EMERGENCY MEDICINE

## 2024-08-27 VITALS
RESPIRATION RATE: 20 BRPM | OXYGEN SATURATION: 100 % | SYSTOLIC BLOOD PRESSURE: 185 MMHG | TEMPERATURE: 97.9 F | WEIGHT: 177.3 LBS | HEART RATE: 111 BPM | HEIGHT: 71 IN | DIASTOLIC BLOOD PRESSURE: 105 MMHG | BODY MASS INDEX: 24.82 KG/M2

## 2024-08-27 DIAGNOSIS — Z91.148 NONCOMPLIANCE WITH MEDICATION REGIMEN: ICD-10-CM

## 2024-08-27 DIAGNOSIS — I16.0 HYPERTENSIVE URGENCY: Primary | ICD-10-CM

## 2024-08-27 LAB
ALBUMIN SERPL-MCNC: 4 G/DL (ref 3.5–5)
ALBUMIN/GLOB SERPL: 0.9 (ref 1.1–2.2)
ALP SERPL-CCNC: 97 U/L (ref 45–117)
ALT SERPL-CCNC: 46 U/L (ref 12–78)
AMPHET UR QL SCN: NEGATIVE
ANION GAP SERPL CALC-SCNC: 13 MMOL/L (ref 5–15)
APPEARANCE UR: CLEAR
AST SERPL W P-5'-P-CCNC: 61 U/L (ref 15–37)
BACTERIA URNS QL MICRO: NEGATIVE /HPF
BARBITURATES UR QL SCN: NEGATIVE
BASOPHILS # BLD: 0.1 K/UL (ref 0–0.1)
BASOPHILS NFR BLD: 1 % (ref 0–1)
BENZODIAZ UR QL: NEGATIVE
BILIRUB SERPL-MCNC: 1 MG/DL (ref 0.2–1)
BILIRUB UR QL: NEGATIVE
BUN SERPL-MCNC: 6 MG/DL (ref 6–20)
BUN/CREAT SERPL: 8 (ref 12–20)
CA-I BLD-MCNC: 9.3 MG/DL (ref 8.5–10.1)
CANNABINOIDS UR QL SCN: NEGATIVE
CHLORIDE SERPL-SCNC: 97 MMOL/L (ref 97–108)
CO2 SERPL-SCNC: 24 MMOL/L (ref 21–32)
COCAINE UR QL SCN: NEGATIVE
COLOR UR: ABNORMAL
CREAT SERPL-MCNC: 0.79 MG/DL (ref 0.7–1.3)
DIFFERENTIAL METHOD BLD: ABNORMAL
EOSINOPHIL # BLD: 0 K/UL (ref 0–0.4)
EOSINOPHIL NFR BLD: 0 % (ref 0–7)
ERYTHROCYTE [DISTWIDTH] IN BLOOD BY AUTOMATED COUNT: 13.3 % (ref 11.5–14.5)
ETHANOL SERPL-MCNC: <10 MG/DL (ref 0–0.08)
GLOBULIN SER CALC-MCNC: 4.4 G/DL (ref 2–4)
GLUCOSE SERPL-MCNC: 111 MG/DL (ref 65–100)
GLUCOSE UR STRIP.AUTO-MCNC: NEGATIVE MG/DL
HCT VFR BLD AUTO: 44.3 % (ref 36.6–50.3)
HGB BLD-MCNC: 15.6 G/DL (ref 12.1–17)
HGB UR QL STRIP: NEGATIVE
IMM GRANULOCYTES # BLD AUTO: 0 K/UL (ref 0–0.04)
IMM GRANULOCYTES NFR BLD AUTO: 0 % (ref 0–0.5)
KETONES UR QL STRIP.AUTO: NEGATIVE MG/DL
LEUKOCYTE ESTERASE UR QL STRIP.AUTO: NEGATIVE
LYMPHOCYTES # BLD: 1.2 K/UL (ref 0.8–3.5)
LYMPHOCYTES NFR BLD: 11 % (ref 12–49)
Lab: NORMAL
MAGNESIUM SERPL-MCNC: 1.9 MG/DL (ref 1.6–2.4)
MCH RBC QN AUTO: 29.1 PG (ref 26–34)
MCHC RBC AUTO-ENTMCNC: 35.2 G/DL (ref 30–36.5)
MCV RBC AUTO: 82.6 FL (ref 80–99)
MDMA, URINE: NEGATIVE
METHADONE UR QL: NEGATIVE
MONOCYTES # BLD: 0.8 K/UL (ref 0–1)
MONOCYTES NFR BLD: 7 % (ref 5–13)
NEUTS SEG # BLD: 8.9 K/UL (ref 1.8–8)
NEUTS SEG NFR BLD: 81 % (ref 32–75)
NITRITE UR QL STRIP.AUTO: NEGATIVE
NRBC # BLD: 0 K/UL (ref 0–0.01)
NRBC BLD-RTO: 0 PER 100 WBC
OPIATES UR QL: NEGATIVE
PCP UR QL: NEGATIVE
PH UR STRIP: 6 (ref 5–8)
PLATELET # BLD AUTO: 228 K/UL (ref 150–400)
PMV BLD AUTO: 9.2 FL (ref 8.9–12.9)
POTASSIUM SERPL-SCNC: 3.7 MMOL/L (ref 3.5–5.1)
PROT SERPL-MCNC: 8.4 G/DL (ref 6.4–8.2)
PROT UR STRIP-MCNC: 100 MG/DL
RBC # BLD AUTO: 5.36 M/UL (ref 4.1–5.7)
RBC #/AREA URNS HPF: ABNORMAL /HPF (ref 0–3)
SODIUM SERPL-SCNC: 134 MMOL/L (ref 136–145)
SP GR UR REFRACTOMETRY: 1.02 (ref 1–1.03)
UROBILINOGEN UR QL STRIP.AUTO: 0.2 EU/DL (ref 0.2–1)
WBC # BLD AUTO: 11 K/UL (ref 4.1–11.1)
WBC URNS QL MICRO: ABNORMAL /HPF (ref 0–5)

## 2024-08-27 PROCEDURE — 36415 COLL VENOUS BLD VENIPUNCTURE: CPT

## 2024-08-27 PROCEDURE — 99284 EMERGENCY DEPT VISIT MOD MDM: CPT

## 2024-08-27 PROCEDURE — 96374 THER/PROPH/DIAG INJ IV PUSH: CPT

## 2024-08-27 PROCEDURE — 6360000002 HC RX W HCPCS: Performed by: EMERGENCY MEDICINE

## 2024-08-27 PROCEDURE — 80053 COMPREHEN METABOLIC PANEL: CPT

## 2024-08-27 PROCEDURE — 93005 ELECTROCARDIOGRAM TRACING: CPT | Performed by: EMERGENCY MEDICINE

## 2024-08-27 PROCEDURE — 6370000000 HC RX 637 (ALT 250 FOR IP): Performed by: EMERGENCY MEDICINE

## 2024-08-27 PROCEDURE — 82077 ASSAY SPEC XCP UR&BREATH IA: CPT

## 2024-08-27 PROCEDURE — 81001 URINALYSIS AUTO W/SCOPE: CPT

## 2024-08-27 PROCEDURE — 80307 DRUG TEST PRSMV CHEM ANLYZR: CPT

## 2024-08-27 PROCEDURE — 83735 ASSAY OF MAGNESIUM: CPT

## 2024-08-27 PROCEDURE — 85025 COMPLETE CBC W/AUTO DIFF WBC: CPT

## 2024-08-27 RX ORDER — AMLODIPINE BESYLATE 5 MG/1
5 TABLET ORAL DAILY
Qty: 30 TABLET | Refills: 0 | Status: SHIPPED | OUTPATIENT
Start: 2024-08-27

## 2024-08-27 RX ORDER — HYDRALAZINE HYDROCHLORIDE 20 MG/ML
20 INJECTION INTRAMUSCULAR; INTRAVENOUS
Status: COMPLETED | OUTPATIENT
Start: 2024-08-27 | End: 2024-08-27

## 2024-08-27 RX ORDER — AMLODIPINE BESYLATE 10 MG/1
10 TABLET ORAL
Status: COMPLETED | OUTPATIENT
Start: 2024-08-27 | End: 2024-08-27

## 2024-08-27 RX ORDER — METOPROLOL TARTRATE 25 MG/1
25 TABLET, FILM COATED ORAL 2 TIMES DAILY
Qty: 60 TABLET | Refills: 1 | Status: SHIPPED | OUTPATIENT
Start: 2024-08-27

## 2024-08-27 RX ADMIN — AMLODIPINE BESYLATE 10 MG: 10 TABLET ORAL at 13:33

## 2024-08-27 RX ADMIN — HYDRALAZINE HYDROCHLORIDE 20 MG: 20 INJECTION, SOLUTION INTRAMUSCULAR; INTRAVENOUS at 13:33

## 2024-08-27 ASSESSMENT — PAIN SCALES - GENERAL
PAINLEVEL_OUTOF10: 0
PAINLEVEL_OUTOF10: 0

## 2024-08-27 ASSESSMENT — PAIN - FUNCTIONAL ASSESSMENT
PAIN_FUNCTIONAL_ASSESSMENT: 0-10
PAIN_FUNCTIONAL_ASSESSMENT: 0-10

## 2024-08-27 NOTE — ED PROVIDER NOTES
[facility] EMERGENCY DEPT  EMERGENCY DEPARTMENT HISTORY AND PHYSICAL EXAM      Date: 2024  Patient Name: Dragan Beth  MRN: [id number]  YOB: 1970  Date of evaluation: 2024  Provider: Hannah Zee MD   Note Started: 1:24 PM EDT 24    HISTORY OF PRESENT ILLNESS     Chief Complaint   Patient presents with    Hypertension       History Provided By: Patient    HPI: Dragan Beth is a 54 y.o. male     PAST MEDICAL HISTORY   Past Medical History:  Past Medical History:   Diagnosis Date    Asthma     Hypertension     Ill-defined condition        Past Surgical History:  Past Surgical History:   Procedure Laterality Date    IR PERC CATH PLEURAL DRAIN W/IMAG  2021    IR PERC CATH PLEURAL DRAIN W/IMAG 2021 SSR RAD ANGIO IR    IR PERC CATH PLEURAL DRAIN W/IMAG  2021       Family History:  Family History   Problem Relation Age of Onset    Hypertension Father     Hypertension Mother        Social History:  Social History     Tobacco Use    Smoking status: Former     Current packs/day: 0.00     Types: Cigarettes     Quit date: 2021     Years since quittin.8    Smokeless tobacco: Never   Substance Use Topics    Alcohol use: Yes    Drug use: Not Currently       Allergies:  No Known Allergies    PCP: Bola Hassan MD    Current Meds:   Current Facility-Administered Medications   Medication Dose Route Frequency Provider Last Rate Last Admin    amLODIPine (NORVASC) tablet 10 mg  10 mg Oral NOW Hannah Zee MD        hydrALAZINE (APRESOLINE) injection 20 mg  20 mg IntraVENous NOW Hannah Zee MD         Current Outpatient Medications   Medication Sig Dispense Refill    albuterol sulfate HFA (PROVENTIL;VENTOLIN;PROAIR) 108 (90 Base) MCG/ACT inhaler Inhale 2 puffs into the lungs every 6 hours as needed for Wheezing or Shortness of Breath 18 g 3    budesonide-formoterol (SYMBICORT) 160-4.5 MCG/ACT AERO Inhale 2 puffs into the lungs 2 times daily 30.6 g

## 2024-08-27 NOTE — ED NOTES
James. MD made aware of vitals including BP and pulse- states pt is ready for dc. Pt is pleasant. No complaints verbalized. Pt stated he is noncompliant with ERNA meds. Reinforced he needs to go to [facility] and  and start meds. Pt verbalized understanding. Dced  pt for primary RN

## 2024-08-27 NOTE — ED TRIAGE NOTES
Patient reports being at work yesterday and felt like his BP was high, checked by nurse on site who said it was. They required him to check it again this morning, got 202/100- told him he needed to be seen for same before returning to work. Patient asymptomatic, denies any complaints. Reports he ran out of BP medication and hasn't had any for months.

## 2024-08-28 LAB
EKG ATRIAL RATE: 119 BPM
EKG DIAGNOSIS: NORMAL
EKG P AXIS: 64 DEGREES
EKG P-R INTERVAL: 146 MS
EKG Q-T INTERVAL: 319 MS
EKG QRS DURATION: 93 MS
EKG QTC CALCULATION (BAZETT): 451 MS
EKG R AXIS: 61 DEGREES
EKG T AXIS: 19 DEGREES
EKG VENTRICULAR RATE: 120 BPM

## 2025-06-18 ENCOUNTER — APPOINTMENT (OUTPATIENT)
Facility: HOSPITAL | Age: 55
End: 2025-06-18

## 2025-06-18 ENCOUNTER — HOSPITAL ENCOUNTER (EMERGENCY)
Facility: HOSPITAL | Age: 55
Discharge: HOME | End: 2025-06-18
Attending: EMERGENCY MEDICINE

## 2025-06-18 VITALS
WEIGHT: 191.4 LBS | BODY MASS INDEX: 26.8 KG/M2 | TEMPERATURE: 97.9 F | OXYGEN SATURATION: 95 % | SYSTOLIC BLOOD PRESSURE: 141 MMHG | HEIGHT: 71 IN | DIASTOLIC BLOOD PRESSURE: 87 MMHG | HEART RATE: 76 BPM | RESPIRATION RATE: 18 BRPM

## 2025-06-18 DIAGNOSIS — I10 ELEVATED SYSTOLIC BLOOD PRESSURE READING WITH DIAGNOSIS OF HYPERTENSION: Primary | ICD-10-CM

## 2025-06-18 DIAGNOSIS — R51.9 NONINTRACTABLE HEADACHE, UNSPECIFIED CHRONICITY PATTERN, UNSPECIFIED HEADACHE TYPE: ICD-10-CM

## 2025-06-18 DIAGNOSIS — F10.10 ALCOHOL ABUSE: ICD-10-CM

## 2025-06-18 DIAGNOSIS — R42 LIGHTHEADEDNESS: ICD-10-CM

## 2025-06-18 DIAGNOSIS — Z91.148 NON COMPLIANCE W MEDICATION REGIMEN: ICD-10-CM

## 2025-06-18 LAB
ALBUMIN SERPL-MCNC: 3.6 G/DL (ref 3.5–5)
ALBUMIN/GLOB SERPL: 0.9 (ref 1.1–2.2)
ALP SERPL-CCNC: 91 U/L (ref 45–117)
ALT SERPL-CCNC: 59 U/L (ref 12–78)
ANION GAP SERPL CALC-SCNC: 12 MMOL/L (ref 2–12)
APPEARANCE UR: CLEAR
AST SERPL W P-5'-P-CCNC: 74 U/L (ref 15–37)
BACTERIA URNS QL MICRO: ABNORMAL /HPF
BASOPHILS # BLD: 0.06 K/UL (ref 0–0.1)
BASOPHILS NFR BLD: 0.9 % (ref 0–1)
BILIRUB SERPL-MCNC: 0.2 MG/DL (ref 0.2–1)
BILIRUB UR QL: NEGATIVE
BUN SERPL-MCNC: 8 MG/DL (ref 6–20)
BUN/CREAT SERPL: 11 (ref 12–20)
CA-I BLD-MCNC: 8.8 MG/DL (ref 8.5–10.1)
CHLORIDE SERPL-SCNC: 101 MMOL/L (ref 97–108)
CO2 SERPL-SCNC: 27 MMOL/L (ref 21–32)
COLOR UR: ABNORMAL
CREAT SERPL-MCNC: 0.76 MG/DL (ref 0.7–1.3)
DIFFERENTIAL METHOD BLD: NORMAL
EOSINOPHIL # BLD: 0.09 K/UL (ref 0–0.4)
EOSINOPHIL NFR BLD: 1.3 % (ref 0–7)
ERYTHROCYTE [DISTWIDTH] IN BLOOD BY AUTOMATED COUNT: 13.6 % (ref 11.5–14.5)
GLOBULIN SER CALC-MCNC: 4.2 G/DL (ref 2–4)
GLUCOSE SERPL-MCNC: 108 MG/DL (ref 65–100)
GLUCOSE UR STRIP.AUTO-MCNC: NEGATIVE MG/DL
HCT VFR BLD AUTO: 41.4 % (ref 36.6–50.3)
HGB BLD-MCNC: 14.3 G/DL (ref 12.1–17)
HGB UR QL STRIP: ABNORMAL
IMM GRANULOCYTES # BLD AUTO: 0.02 K/UL (ref 0–0.04)
IMM GRANULOCYTES NFR BLD AUTO: 0.3 % (ref 0–0.5)
KETONES UR QL STRIP.AUTO: NEGATIVE MG/DL
LEUKOCYTE ESTERASE UR QL STRIP.AUTO: NEGATIVE
LYMPHOCYTES # BLD: 2.86 K/UL (ref 0.8–3.5)
LYMPHOCYTES NFR BLD: 41 % (ref 12–49)
MCH RBC QN AUTO: 28.8 PG (ref 26–34)
MCHC RBC AUTO-ENTMCNC: 34.5 G/DL (ref 30–36.5)
MCV RBC AUTO: 83.3 FL (ref 80–99)
MONOCYTES # BLD: 0.68 K/UL (ref 0–1)
MONOCYTES NFR BLD: 9.8 % (ref 5–13)
NEUTS SEG # BLD: 3.26 K/UL (ref 1.8–8)
NEUTS SEG NFR BLD: 46.7 % (ref 32–75)
NITRITE UR QL STRIP.AUTO: NEGATIVE
NRBC # BLD: 0 K/UL (ref 0–0.01)
NRBC BLD-RTO: 0 PER 100 WBC
PH UR STRIP: 6 (ref 5–8)
PLATELET # BLD AUTO: 237 K/UL (ref 150–400)
PMV BLD AUTO: 8.9 FL (ref 8.9–12.9)
POTASSIUM SERPL-SCNC: 3.8 MMOL/L (ref 3.5–5.1)
PROT SERPL-MCNC: 7.8 G/DL (ref 6.4–8.2)
PROT UR STRIP-MCNC: 100 MG/DL
RBC # BLD AUTO: 4.97 M/UL (ref 4.1–5.7)
RBC #/AREA URNS HPF: ABNORMAL /HPF (ref 0–3)
SODIUM SERPL-SCNC: 140 MMOL/L (ref 136–145)
SP GR UR REFRACTOMETRY: 1.02 (ref 1–1.03)
TROPONIN I SERPL HS-MCNC: 14 NG/L (ref 0–76)
URINE CULTURE IF INDICATED: ABNORMAL
UROBILINOGEN UR QL STRIP.AUTO: 0.2 EU/DL (ref 0.2–1)
WBC # BLD AUTO: 7 K/UL (ref 4.1–11.1)
WBC URNS QL MICRO: ABNORMAL /HPF (ref 0–5)

## 2025-06-18 PROCEDURE — 36415 COLL VENOUS BLD VENIPUNCTURE: CPT

## 2025-06-18 PROCEDURE — 84484 ASSAY OF TROPONIN QUANT: CPT

## 2025-06-18 PROCEDURE — 85025 COMPLETE CBC W/AUTO DIFF WBC: CPT

## 2025-06-18 PROCEDURE — 80053 COMPREHEN METABOLIC PANEL: CPT

## 2025-06-18 PROCEDURE — 99285 EMERGENCY DEPT VISIT HI MDM: CPT

## 2025-06-18 PROCEDURE — 81001 URINALYSIS AUTO W/SCOPE: CPT

## 2025-06-18 PROCEDURE — 71045 X-RAY EXAM CHEST 1 VIEW: CPT

## 2025-06-18 PROCEDURE — 93005 ELECTROCARDIOGRAM TRACING: CPT | Performed by: EMERGENCY MEDICINE

## 2025-06-18 PROCEDURE — 6370000000 HC RX 637 (ALT 250 FOR IP): Performed by: EMERGENCY MEDICINE

## 2025-06-18 RX ORDER — ACETAMINOPHEN 500 MG
1000 TABLET ORAL
Status: COMPLETED | OUTPATIENT
Start: 2025-06-18 | End: 2025-06-18

## 2025-06-18 RX ADMIN — ACETAMINOPHEN 1000 MG: 500 TABLET ORAL at 21:46

## 2025-06-18 ASSESSMENT — LIFESTYLE VARIABLES
HOW MANY STANDARD DRINKS CONTAINING ALCOHOL DO YOU HAVE ON A TYPICAL DAY: 3 OR 4
HOW OFTEN DO YOU HAVE A DRINK CONTAINING ALCOHOL: 4 OR MORE TIMES A WEEK

## 2025-06-18 ASSESSMENT — PAIN - FUNCTIONAL ASSESSMENT: PAIN_FUNCTIONAL_ASSESSMENT: NONE - DENIES PAIN

## 2025-06-19 LAB
EKG ATRIAL RATE: 90 BPM
EKG DIAGNOSIS: NORMAL
EKG P AXIS: 65 DEGREES
EKG P-R INTERVAL: 178 MS
EKG Q-T INTERVAL: 369 MS
EKG QRS DURATION: 84 MS
EKG QTC CALCULATION (BAZETT): 452 MS
EKG R AXIS: 62 DEGREES
EKG T AXIS: 53 DEGREES
EKG VENTRICULAR RATE: 90 BPM

## 2025-06-19 PROCEDURE — 93010 ELECTROCARDIOGRAM REPORT: CPT | Performed by: SPECIALIST

## 2025-06-19 NOTE — ED PROVIDER NOTES
[facility] EMERGENCY DEPT  EMERGENCY DEPARTMENT HISTORY AND PHYSICAL EXAM      Date of evaluation: 6/18/2025  Patient Name: Dragan Beth  Birthdate 1970  MRN: [id number]  ED Provider: Mallory Field MD   Note Started: 9:19 PM EDT 6/18/25    HISTORY OF PRESENT ILLNESS     Chief Complaint   Patient presents with    Hypertension       History Provided By: Patient, only     HPI: Dragan Beth is a 55 y.o. male presents with concerns over elevated BP more recently over past months but presents due to HA and some light-headedness since yesterday. He reports being non-compliant with his BP medications for months and no PCP follow-up. He believes his symptoms are due to his elevated BP. He otherwise denies vision changes, weakness, speech difficulty, NV, SOB, or chest pain. He does endorse daily alcohol usage on six pack of beer per day. He denies desire to change his alcohol intake.    PAST MEDICAL HISTORY   Past Medical History:  Past Medical History:   Diagnosis Date    Asthma     Hypertension     Ill-defined condition        Past Surgical History:  Past Surgical History:   Procedure Laterality Date    IR GUIDED PERC PLEURAL DRAIN W CATH INSERT  12/9/2021    IR PERC CATH PLEURAL DRAIN W/IMAG 12/9/2021 SSR RAD ANGIO IR    IR GUIDED PERC PLEURAL DRAIN W CATH INSERT  12/9/2021       Family History:  Family History   Problem Relation Age of Onset    Hypertension Father     Hypertension Mother        Social History:  Social History     Tobacco Use    Smoking status: Former     Current packs/day: 0.00     Types: Cigarettes     Quit date: 11/1/2021     Years since quitting: 3.6    Smokeless tobacco: Never   Substance Use Topics    Alcohol use: Yes    Drug use: Not Currently       Allergies:  No Known Allergies    PCP: Bola Hassan MD    Current Meds:   No current facility-administered medications for this encounter.     Current Outpatient Medications   Medication Sig Dispense Refill    amLODIPine (NORVASC) 5 MG

## 2025-06-19 NOTE — DISCHARGE INSTRUCTIONS
Thank you for choosing our Emergency Department for your care.  It is our privilege to care for you in your time of need.  In the next several days, you may receive a survey via email or mailed to your home about your experience with our team.  We would greatly appreciate you taking a few minutes to complete the survey, as we use this information to learn what we have done well and what we could be doing better. Thank you for trusting us with your care!    Below you will find a list of your tests from today's visit.   Labs and Radiology Studies  Recent Results (from the past 12 hours)   EKG 12 Lead    Collection Time: 06/18/25  9:46 PM   Result Value Ref Range    Ventricular Rate 90 BPM    Atrial Rate 90 BPM    P-R Interval 178 ms    QRS Duration 84 ms    Q-T Interval 369 ms    QTc Calculation (Bazett) 452 ms    P Axis 65 degrees    R Axis 62 degrees    T Axis 53 degrees    Diagnosis Sinus rhythm    CBC with Auto Differential    Collection Time: 06/18/25  9:52 PM   Result Value Ref Range    WBC 7.0 4.1 - 11.1 K/uL    RBC 4.97 4.10 - 5.70 M/uL    Hemoglobin 14.3 12.1 - 17.0 g/dL    Hematocrit 41.4 36.6 - 50.3 %    MCV 83.3 80.0 - 99.0 FL    MCH 28.8 26.0 - 34.0 PG    MCHC 34.5 30.0 - 36.5 g/dL    RDW 13.6 11.5 - 14.5 %    Platelets 237 150 - 400 K/uL    MPV 8.9 8.9 - 12.9 FL    Nucleated RBCs 0.0 0.0  WBC    nRBC 0.00 0.00 - 0.01 K/uL    Neutrophils % 46.7 32.0 - 75.0 %    Lymphocytes % 41.0 12.0 - 49.0 %    Monocytes % 9.8 5.0 - 13.0 %    Eosinophils % 1.3 0.0 - 7.0 %    Basophils % 0.9 0.0 - 1.0 %    Immature Granulocytes % 0.3 0 - 0.5 %    Neutrophils Absolute 3.26 1.80 - 8.00 K/UL    Lymphocytes Absolute 2.86 0.80 - 3.50 K/UL    Monocytes Absolute 0.68 0.00 - 1.00 K/UL    Eosinophils Absolute 0.09 0.00 - 0.40 K/UL    Basophils Absolute 0.06 0.00 - 0.10 K/UL    Immature Granulocytes Absolute 0.02 0.00 - 0.04 K/UL    Differential Type AUTOMATED     Urinalysis with Reflex to Culture    Collection Time:

## 2025-06-19 NOTE — ED TRIAGE NOTES
Pt states that he has been feeling lightheaded since yesterday and he knows its because his blood pressure it high. Pt states that he is suppose to take blood pressure medication but has not taken any medications in quite some time and has not seen his primary to get medications in a long time either. Pt denies any pain and admits to daily ETOH use.

## (undated) DEVICE — CUTTER ENDOSCP L340MM LIN ARTC SGL STROKE FIRING ENDOPATH

## (undated) DEVICE — BASIC SINGLE BASIN-LF: Brand: MEDLINE INDUSTRIES, INC.

## (undated) DEVICE — YANKAUER,BULB TIP,W/O VENT,RIGID,STERILE: Brand: MEDLINE

## (undated) DEVICE — SPONGE HEMOSTAT CELLULS 4X8IN -- SURGICEL

## (undated) DEVICE — GLOVE ORTHO 8   MSG9480

## (undated) DEVICE — SPONGE GZ W4XL4IN COT 12 PLY TYP VII WVN C FLD DSGN

## (undated) DEVICE — CATHETER THOR 28FR L23CM DIA9.3MM POLYVI CHL TAPR CONN TIP

## (undated) DEVICE — SUTURE VCRL SZ 4-0 L27IN ABSRB UD L24MM PS-1 3/8 CIR PRIM J935H

## (undated) DEVICE — LAPAROSCOPIC CHOLE PACK: Brand: MEDLINE INDUSTRIES, INC.

## (undated) DEVICE — CATHETER THORACENTESIS STR 28 FRX23 IN 6 EYELET TAPR TIP LF

## (undated) DEVICE — SPONGE: SPECIALTY PEANUT XR 100/CS: Brand: MEDICAL ACTION INDUSTRIES

## (undated) DEVICE — DERMABOND SKIN ADH 0.7ML -- DERMABOND ADVANCED 12/BX

## (undated) DEVICE — CONTAINER,SPECIMEN,3OZ,OR STRL: Brand: MEDLINE

## (undated) DEVICE — APPLIER RMFG CLP LIG MED 29.2 --

## (undated) DEVICE — E-Z CLEAN, PTFE COATED, ELECTROSURGICAL LAPAROSCOPIC ELECTRODE, L-HOOK, 33 CM., SINGLE-USE, FOR USE WITH HAND CONTROL PENCIL: Brand: MEGADYNE

## (undated) DEVICE — SUT VCRL + 2-0 36IN CT1 UD --

## (undated) DEVICE — CATHETER THOR 32FR L23IN PVC 5 EYELET STR ATRAUM

## (undated) DEVICE — CLIP INT L ORNG TI TRNSVRS GRV CHEVRON SHP W/ PRECIS TIP TO

## (undated) DEVICE — CRADLE POS 3X5X24IN RASPBERRY ARM PRONE FOAM DISP

## (undated) DEVICE — SUTURE VCRL SZ 0 L36IN ABSRB UD L36MM CT-1 1/2 CIR J946H

## (undated) DEVICE — SOUTHSIDE TURNOVER: Brand: MEDLINE INDUSTRIES, INC.

## (undated) DEVICE — CONNECTOR TBNG SUCTION 3/8X3/8X3/8 IN

## (undated) DEVICE — ULNAR NERVE PROTECTOR FOAM POSITIONER: Brand: CARDINAL HEALTH

## (undated) DEVICE — TOWEL SURG W17XL27IN STD BLU COT NONFENESTRATED PREWASHED

## (undated) DEVICE — SPONGE LAP 18X18IN STRL -- 5/PK

## (undated) DEVICE — 3M™ IOBAN™ 2 ANTIMICROBIAL INCISE DRAPE 6650EZ: Brand: IOBAN™ 2

## (undated) DEVICE — INTENDED FOR TISSUE SEPARATION, AND OTHER PROCEDURES THAT REQUIRE A SHARP SURGICAL BLADE TO PUNCTURE OR CUT.: Brand: BARD-PARKER ® CARBON RIB-BACK BLADES

## (undated) DEVICE — TUBING, SUCTION, 1/4" X 12', STRAIGHT: Brand: MEDLINE

## (undated) DEVICE — TROCAR ENDOSCP L80MM DIA10/12MM THOR RIG SL DISP FLXPATH

## (undated) DEVICE — TTL1LYR 16FR10ML 100%SIL TMPST TR: Brand: MEDLINE

## (undated) DEVICE — CLIP LIG M BLU TI HRT SHP WIRE HORZ 600 PER BX

## (undated) DEVICE — GARMENT,MEDLINE,DVT,INT,CALF,MED, GEN2: Brand: MEDLINE

## (undated) DEVICE — MAGNETIC INSTR DRAPE 20X16: Brand: MEDLINE INDUSTRIES, INC.

## (undated) DEVICE — Device

## (undated) DEVICE — KIT SEALNT PLEURAL PRGL 4ML --

## (undated) DEVICE — SYRINGE IRRIG 60ML SFT PLIABLE BLB EZ TO GRP 1 HND USE W/

## (undated) DEVICE — SOLUTION IRRIG 500ML 0.9% SOD CHL USP POUR PLAS BTL

## (undated) DEVICE — PREP SKN CHLRAPRP APL 26ML STR --

## (undated) DEVICE — CATHETER THOR 32FR L23IN PVC 6 EYELET STR ATRAUM

## (undated) DEVICE — TAPE,CLOTH/SILK,CURAD,3"X10YD,LF,40/CS: Brand: CURAD

## (undated) DEVICE — RELOAD STPL SZ 0 L45MM DIA3.5MM 0DEG STD REG TISS BLU TI

## (undated) DEVICE — SUT VICRYL PLUS UD BR 2/0 54 -- VICRYL PLUS

## (undated) DEVICE — PAD,NON-ADHERENT,2X3,STERILE,LF,1/PK: Brand: MEDLINE

## (undated) DEVICE — SUTURE PERMA-HAND 0 L18IN NONABSORBABLE BLK CT-1 L36MM 1/2 C021D